# Patient Record
Sex: FEMALE | Race: WHITE | NOT HISPANIC OR LATINO | Employment: OTHER | URBAN - METROPOLITAN AREA
[De-identification: names, ages, dates, MRNs, and addresses within clinical notes are randomized per-mention and may not be internally consistent; named-entity substitution may affect disease eponyms.]

---

## 2017-02-01 ENCOUNTER — OFFICE VISIT (OUTPATIENT)
Dept: INTERNAL MEDICINE | Facility: CLINIC | Age: 67
End: 2017-02-01

## 2017-02-01 ENCOUNTER — APPOINTMENT (OUTPATIENT)
Dept: LAB | Facility: HOSPITAL | Age: 67
End: 2017-02-01

## 2017-02-01 VITALS
BODY MASS INDEX: 22.49 KG/M2 | WEIGHT: 135 LBS | HEART RATE: 52 BPM | DIASTOLIC BLOOD PRESSURE: 72 MMHG | HEIGHT: 65 IN | SYSTOLIC BLOOD PRESSURE: 108 MMHG | TEMPERATURE: 96.3 F | OXYGEN SATURATION: 99 % | RESPIRATION RATE: 16 BRPM

## 2017-02-01 DIAGNOSIS — M81.0 POST-MENOPAUSAL OSTEOPOROSIS: ICD-10-CM

## 2017-02-01 DIAGNOSIS — Z11.59 NEED FOR HEPATITIS C SCREENING TEST: ICD-10-CM

## 2017-02-01 DIAGNOSIS — M41.25 OTHER IDIOPATHIC SCOLIOSIS, THORACOLUMBAR REGION: ICD-10-CM

## 2017-02-01 DIAGNOSIS — J45.20 MILD INTERMITTENT ASTHMA WITHOUT COMPLICATION: Primary | ICD-10-CM

## 2017-02-01 DIAGNOSIS — E78.5 DYSLIPIDEMIA: ICD-10-CM

## 2017-02-01 DIAGNOSIS — J45.20 MILD INTERMITTENT ASTHMA WITHOUT COMPLICATION: ICD-10-CM

## 2017-02-01 DIAGNOSIS — G89.29 CHRONIC BILATERAL LOW BACK PAIN WITHOUT SCIATICA: ICD-10-CM

## 2017-02-01 DIAGNOSIS — M54.50 CHRONIC BILATERAL LOW BACK PAIN WITHOUT SCIATICA: ICD-10-CM

## 2017-02-01 DIAGNOSIS — M19.049 PRIMARY LOCALIZED OSTEOARTHROSIS OF HAND, UNSPECIFIED LATERALITY: ICD-10-CM

## 2017-02-01 DIAGNOSIS — G47.9 DYSSOMNIA: ICD-10-CM

## 2017-02-01 DIAGNOSIS — E55.9 VITAMIN D DEFICIENCY: ICD-10-CM

## 2017-02-01 DIAGNOSIS — J30.9 ATOPIC RHINITIS: Primary | ICD-10-CM

## 2017-02-01 DIAGNOSIS — Z82.49 FAMILY HISTORY OF HEART DISEASE: ICD-10-CM

## 2017-02-01 DIAGNOSIS — Z00.00 PREVENTATIVE HEALTH CARE: ICD-10-CM

## 2017-02-01 DIAGNOSIS — M47.816 SPONDYLOSIS OF LUMBAR REGION WITHOUT MYELOPATHY OR RADICULOPATHY: ICD-10-CM

## 2017-02-01 LAB
25(OH)D3 SERPL-MCNC: 53.7 NG/ML
ALBUMIN SERPL-MCNC: 4.2 G/DL (ref 3.2–4.8)
ALBUMIN/GLOB SERPL: 1.6 G/DL (ref 1.5–2.5)
ALP SERPL-CCNC: 49 U/L (ref 25–100)
ALT SERPL W P-5'-P-CCNC: 24 U/L (ref 7–40)
ANION GAP SERPL CALCULATED.3IONS-SCNC: -1 MMOL/L (ref 3–11)
ARTICHOKE IGE QN: 100 MG/DL (ref 0–130)
AST SERPL-CCNC: 32 U/L (ref 0–33)
BACTERIA UR QL AUTO: ABNORMAL /HPF
BASOPHILS # BLD AUTO: 0.04 10*3/MM3 (ref 0–0.2)
BASOPHILS NFR BLD AUTO: 1.2 % (ref 0–1)
BILIRUB SERPL-MCNC: 0.5 MG/DL (ref 0.3–1.2)
BILIRUB UR QL STRIP: NEGATIVE
BUN BLD-MCNC: 16 MG/DL (ref 9–23)
BUN/CREAT SERPL: 22.9 (ref 7–25)
CALCIUM SPEC-SCNC: 9.9 MG/DL (ref 8.7–10.4)
CHLORIDE SERPL-SCNC: 109 MMOL/L (ref 99–109)
CHOLEST SERPL-MCNC: 182 MG/DL (ref 0–200)
CLARITY UR: ABNORMAL
CO2 SERPL-SCNC: 35 MMOL/L (ref 20–31)
COLOR UR: YELLOW
CREAT BLD-MCNC: 0.7 MG/DL (ref 0.6–1.3)
DEPRECATED RDW RBC AUTO: 44 FL (ref 37–54)
EOSINOPHIL # BLD AUTO: 0.19 10*3/MM3 (ref 0.1–0.3)
EOSINOPHIL NFR BLD AUTO: 5.8 % (ref 0–3)
ERYTHROCYTE [DISTWIDTH] IN BLOOD BY AUTOMATED COUNT: 13.3 % (ref 11.3–14.5)
GFR SERPL CREATININE-BSD FRML MDRD: 84 ML/MIN/1.73
GLOBULIN UR ELPH-MCNC: 2.6 GM/DL
GLUCOSE BLD-MCNC: 87 MG/DL (ref 70–100)
GLUCOSE UR STRIP-MCNC: NEGATIVE MG/DL
HCT VFR BLD AUTO: 42.8 % (ref 34.5–44)
HCV AB SER DONR QL: NORMAL
HDLC SERPL-MCNC: 69 MG/DL (ref 40–60)
HGB BLD-MCNC: 14.4 G/DL (ref 11.5–15.5)
HGB UR QL STRIP.AUTO: NEGATIVE
HYALINE CASTS UR QL AUTO: ABNORMAL /LPF
IMM GRANULOCYTES # BLD: 0 10*3/MM3 (ref 0–0.03)
IMM GRANULOCYTES NFR BLD: 0 % (ref 0–0.6)
KETONES UR QL STRIP: NEGATIVE
LEUKOCYTE ESTERASE UR QL STRIP.AUTO: NEGATIVE
LYMPHOCYTES # BLD AUTO: 1.19 10*3/MM3 (ref 0.6–4.8)
LYMPHOCYTES NFR BLD AUTO: 36.4 % (ref 24–44)
MCH RBC QN AUTO: 30.6 PG (ref 27–31)
MCHC RBC AUTO-ENTMCNC: 33.6 G/DL (ref 32–36)
MCV RBC AUTO: 91.1 FL (ref 80–99)
MONOCYTES # BLD AUTO: 0.35 10*3/MM3 (ref 0–1)
MONOCYTES NFR BLD AUTO: 10.7 % (ref 0–12)
NEUTROPHILS # BLD AUTO: 1.5 10*3/MM3 (ref 1.5–8.3)
NEUTROPHILS NFR BLD AUTO: 45.9 % (ref 41–71)
NITRITE UR QL STRIP: NEGATIVE
PH UR STRIP.AUTO: 7 [PH] (ref 5–8)
PLATELET # BLD AUTO: 184 10*3/MM3 (ref 150–450)
PMV BLD AUTO: 10.5 FL (ref 6–12)
POTASSIUM BLD-SCNC: 4.8 MMOL/L (ref 3.5–5.5)
PROT SERPL-MCNC: 6.8 G/DL (ref 5.7–8.2)
PROT UR QL STRIP: NEGATIVE
RBC # BLD AUTO: 4.7 10*6/MM3 (ref 3.89–5.14)
RBC # UR: ABNORMAL /HPF
REF LAB TEST METHOD: ABNORMAL
SODIUM BLD-SCNC: 143 MMOL/L (ref 132–146)
SP GR UR STRIP: 1.02 (ref 1–1.03)
SQUAMOUS #/AREA URNS HPF: ABNORMAL /HPF
TRIGL SERPL-MCNC: 39 MG/DL (ref 0–150)
UROBILINOGEN UR QL STRIP: ABNORMAL
WBC NRBC COR # BLD: 3.27 10*3/MM3 (ref 3.5–10.8)
WBC UR QL AUTO: ABNORMAL /HPF

## 2017-02-01 PROCEDURE — 86803 HEPATITIS C AB TEST: CPT | Performed by: INTERNAL MEDICINE

## 2017-02-01 PROCEDURE — 99214 OFFICE O/P EST MOD 30 MIN: CPT | Performed by: INTERNAL MEDICINE

## 2017-02-01 PROCEDURE — 82306 VITAMIN D 25 HYDROXY: CPT | Performed by: INTERNAL MEDICINE

## 2017-02-01 PROCEDURE — 85025 COMPLETE CBC W/AUTO DIFF WBC: CPT | Performed by: INTERNAL MEDICINE

## 2017-02-01 PROCEDURE — 80053 COMPREHEN METABOLIC PANEL: CPT | Performed by: INTERNAL MEDICINE

## 2017-02-01 PROCEDURE — 99397 PER PM REEVAL EST PAT 65+ YR: CPT | Performed by: INTERNAL MEDICINE

## 2017-02-01 PROCEDURE — 81001 URINALYSIS AUTO W/SCOPE: CPT | Performed by: INTERNAL MEDICINE

## 2017-02-01 PROCEDURE — 80061 LIPID PANEL: CPT | Performed by: INTERNAL MEDICINE

## 2017-02-01 PROCEDURE — 36415 COLL VENOUS BLD VENIPUNCTURE: CPT | Performed by: INTERNAL MEDICINE

## 2017-02-01 PROCEDURE — 93000 ELECTROCARDIOGRAM COMPLETE: CPT | Performed by: INTERNAL MEDICINE

## 2017-02-01 NOTE — PROGRESS NOTES
Subjective   Tatyana Willett is a 66 y.o. female.     Chief Complaint   Patient presents with   • Back Pain       History of Present Illness     The patient has a 4 year history of progressive back pain.  She's been found to have lumbar scoliosis and an MRI in 2013 showed lumbar spinal stenosis at L3-L4.  She had used gabapentin for 2 years for improved pain control.  She's been off of gabapentin last year and feels she is doing well without it.  She has used naproxen but does not need it regularly now.  She completed physical therapy in November and feels that her back maintenance exercises have greatly helped.  She has no sciatica.  She has not had to give up daily activities.    The patient has many years of recurring seasonal asthma.  She has done much better last year and is not required her Singulair or inhalers in the last year.  She has had severe exposures to allergens several years ago and underwent bronchoscopy in 2011.  She has had no recent cough or wheezing.    The following portions of the patient's history were reviewed and updated as appropriate: allergies, current medications, past family history, past medical history, past social history, past surgical history and problem list.    Active Ambulatory Problems     Diagnosis Date Noted   • Atopic rhinitis 05/13/2016   • Asthma 05/13/2016   • Ingrown toenail 05/13/2016   • Osteoarthritis of lumbar spine 05/13/2016   • Primary localized osteoarthrosis of hand 05/13/2016   • Scoliosis 05/13/2016   • Dyssomnia 05/13/2016   • Preventative health care 09/07/2016   • Low back pain 09/07/2016     Resolved Ambulatory Problems     Diagnosis Date Noted   • Cellulitis 05/13/2016   • Ethmoid sinusitis 05/13/2016     Past Medical History   Diagnosis Date   • Allergic rhinitis    • CTS (carpal tunnel syndrome) 2004   • Lumbar scoliosis    • Lumbar spinal stenosis 2013   • Menopause 2000   • Migraine 2004   • Mononucleosis 1962   • Osteoarthritis of hands,  bilateral    • Varicose vein of leg      Past Surgical History   Procedure Laterality Date   • Tonsillectomy     • Vein surgery Left      Ligation of left leg   • Bronchoscopy       Severe allergic pneumonitis     Family History   Problem Relation Age of Onset   • Dementia Mother    • Hearing loss Mother    • Osteoarthritis Mother    • Esophageal cancer Father        age 60   • Achalasia Brother    • Osteoarthritis Brother    • Pancreatic cancer Brother    • Pancreatic cancer Maternal Grandmother    • Heart failure Paternal Grandmother    • Heart attack Paternal Grandfather       age 65   • Osteoarthritis Brother      Bilateral hip replacements   • Breast cancer Neg Hx    • Ovarian cancer Neg Hx      Social History     Social History   • Marital status:      Spouse name: N/A   • Number of children: N/A   • Years of education: N/A     Occupational History   • Not on file.     Social History Main Topics   • Smoking status: Never Smoker   • Smokeless tobacco: Never Used   • Alcohol use No   • Drug use: No   • Sexual activity: Not on file     Other Topics Concern   • Not on file     Social History Narrative    Domestic life   lives in private home with         [employer]   L[employer] day        Sleep hygiene   some sleep impairment from back pain        Caffeine use   2 cups half-and-half coffee daily        Exercise habits    walks daily.  Back exercises regularly.  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   HENT: Negative for ear pain and sore throat.    Eyes: Negative for itching and visual disturbance.   Respiratory: Negative for cough and shortness of  "breath.    Cardiovascular: Negative for chest pain and palpitations.   Gastrointestinal: Negative for abdominal pain and nausea.   Endocrine: Negative for cold intolerance and heat intolerance.   Genitourinary: Negative for dysuria and hematuria.   Musculoskeletal: Positive for back pain. Negative for arthralgias.   Skin: Negative for rash and wound.   Allergic/Immunologic: Negative for environmental allergies and food allergies.   Neurological: Negative for dizziness and headaches.   Hematological: Negative for adenopathy. Does not bruise/bleed easily.   Psychiatric/Behavioral: Positive for sleep disturbance. The patient is not nervous/anxious.        Objective   Blood pressure 108/72, pulse 52, temperature 96.3 °F (35.7 °C), temperature source Oral, resp. rate 16, height 65\" (165.1 cm), weight 135 lb (61.2 kg), SpO2 99 %, not currently breastfeeding.    Physical Exam   Constitutional: She is oriented to person, place, and time. She appears well-developed and well-nourished. No distress.   HENT:   Right Ear: External ear normal.   Left Ear: External ear normal.   Nose: Nose normal.   Mouth/Throat: Oropharynx is clear and moist.   Eyes: EOM are normal. Pupils are equal, round, and reactive to light. No scleral icterus.   Neck: Normal range of motion. Neck supple. No JVD present. No thyromegaly present.   Cardiovascular: Normal rate, regular rhythm, normal heart sounds and intact distal pulses.    Pulmonary/Chest: Effort normal and breath sounds normal. She has no wheezes. She has no rales.   Abdominal: Soft. Bowel sounds are normal. She exhibits no distension and no mass. There is no tenderness.   Genitourinary:   Genitourinary Comments: Per GYN   Musculoskeletal: Normal range of motion. She exhibits no edema.   Moderate osteoarthritis of hands with good  strength minimal tenderness  Mild scoliosis of the L-spine   Lymphadenopathy:     She has no cervical adenopathy.   Neurological: She is alert and oriented to " person, place, and time. She displays normal reflexes. No cranial nerve deficit. She exhibits normal muscle tone. Coordination normal.   Vibratory normal  Romberg negative  Gait normal  Plantars normal   Skin: Skin is warm and dry. No rash noted.   Psychiatric: She has a normal mood and affect. Her behavior is normal. Judgment and thought content normal.   Nursing note and vitals reviewed.      ECG 12 Lead  Date/Time: 2/1/2017 3:09 PM  Performed by: CYRUS LEVIN  Authorized by: CYRUS LEVIN   Interpreted by ED physician: Cyrus Levin M.D.  Comparison: compared with previous ECG from 3/20/2013  Similar to previous ECG  Rhythm: sinus rhythm  Rate: normal  BPM: 60  Conduction: conduction normal  ST Segments: ST segments normal  T Waves: T waves normal  QRS axis: normal  Other: no other findings  Clinical impression: normal ECG  Comments: Indication - family history heart disease  Baseline EKG          Assessment/Plan   Tatyana was seen today for back pain.    Diagnoses and all orders for this visit:    Atopic rhinitis    Mild intermittent asthma without complication    Chronic bilateral low back pain without sciatica    Spondylosis of lumbar region without myelopathy or radiculopathy    Primary localized osteoarthrosis of hand, unspecified laterality    Other idiopathic scoliosis, thoracolumbar region    Dyssomnia    Preventative health care    Family history of heart disease  -     ECG 12 Lead    Post-menopausal osteoporosis  -     DEXA Bone Density Axial; Future      The patient's lumbar scoliosis and lumbar spinal stenosis have much improved in recent months with physical therapy.  I've asked the patient to be disciplined every morning with stretching and strengthening exercises.  Because of her postmenopausal state we will proceed with a DEXA scan to assess her risks and further needs treatment.    The patient's asthma appears to be in remission.  I've cautioned her to continue and environmental  control and use inhalers at least one month at a time when she symptomatic.  Her eosinophil count at 6% is the highest and a few years indicating high risk for exacerbations.    The patient has moderate osteoarthritis her hands.  She has good prescription strength and minimal tenderness.  Therapeutic putty will allow her to keep up her functional capacity and improve her pain control.    Patient's had many years of sleep disturbance previously related to her postmenopausal state.  She had done well with gabapentin the past but now does well on melatonin 5 mg at night.  She does have an occasional night of severe insomnia and does well with trazodone 25 mg.    The preventive exam has been reviewed in detail.  The patient has been fully counseled on preventative guidelines for vaccines, cancer screenings, and other health maintenance needs.   The patient has been counseled on guidelines for maintaining a lifestyle to promote good health and to minimize chronic diseases.  The patient has been assisted with scheduling these healthcare procedures for the coming year and given a written document outlining these recommendations.    Patient Instructions   1.  Continue same medications and supplements - as listed.    2.  Increase daily walking - at work by 500 steps daily - to improve physical fitness.    3.  Follow well-balanced American Heart Association diet - low in salt and low in sugar.    4.  Perform low back exercises 5 minutes every morning - to build strength and flexibility.    5.  Use therapeutic putty 3 days weekly - to keep good  strength.    6.  Exercise pulmonary hygiene - to protect lungs - use inhalers as needed to control symptoms.    7.  Return in one year - annual checkup fasting.    8.  DEXA scan this winter - to evaluate for osteoporosis.    9.  CBC shows 6% eosinophils.  This indicates moderate allergy activity.    10.  Other testing is acceptable and requires no change in treatment.    Current  Outpatient Prescriptions:   •  albuterol (PROAIR HFA) 108 (90 BASE) MCG/ACT inhaler, Inhale 1 spray daily as needed., Disp: , Rfl:   •  aspirin 81 MG tablet, Take 1 tablet by mouth 3 (Three) Times a Week., Disp: , Rfl:   •  budesonide-formoterol (SYMBICORT) 160-4.5 MCG/ACT inhaler, Inhale 2 sprays daily as needed., Disp: , Rfl:   •  Calcium Carbonate-Vitamin D (CALTRATE 600+D PO), Take 1 tablet by mouth Daily., Disp: , Rfl:   •  Cholecalciferol (VITAMIN D) 1000 UNITS tablet, Take 1 capsule by mouth Daily., Disp: , Rfl:   •  fluticasone (FLONASE) 50 MCG/ACT nasal spray, 1 spray into each nostril daily., Disp: , Rfl:   •  Glucosamine-Chondroit-Vit C-Mn (GLUCOSAMINE 1500 COMPLEX PO), Take 1 tablet by mouth daily., Disp: , Rfl:   •  loratadine (CLARITIN) 10 MG tablet, Take 1 tablet by mouth daily as needed., Disp: , Rfl:   •  melatonin 5 MG tablet tablet, Take 1 tablet by mouth every night., Disp: , Rfl:   •  montelukast (SINGULAIR) 10 MG tablet, Take 1 tablet by mouth daily as needed., Disp: , Rfl:   •  Multiple Vitamins-Minerals (WOMENS ONE DAILY) tablet, Take 1 tablet by mouth daily., Disp: , Rfl:   •  olopatadine (PATANASE) 0.6 % solution nasal solution, 1 spray into each nostril daily as needed., Disp: , Rfl:   •  Omega-3 Fatty Acids (FISH OIL) 1000 MG capsule capsule, Take 1 capsule by mouth daily., Disp: , Rfl:   •  vitamin C (ASCORBIC ACID) 500 MG tablet, Take 1 tablet by mouth daily., Disp: , Rfl:     Allergies   Allergen Reactions   • Shellfish Allergy Hives     oysters   • Levofloxacin Myalgia   • Poison Ivy Extract [Extract Of Poison Ivy] Rash       Immunization History   Administered Date(s) Administered   • Influenza, Quadrivalent 10/01/2016   • Pneumococcal Polysaccharide 03/21/2011   • Tdap 03/20/2008   • Zoster 04/25/2016     Electronically signed Cyrus Levin M.D.2/1/2017 5:18 PM

## 2017-02-01 NOTE — PATIENT INSTRUCTIONS
1.  Continue same medications and supplements - as listed.    2.  Increase daily walking - at work by 500 steps daily - to improve physical fitness.    3.  Follow well-balanced American Heart Association diet - low in salt and low in sugar.    4.  Perform low back exercises 5 minutes every morning - to build strength and flexibility.    5.  Use therapeutic putty 3 days weekly - to keep good  strength.    6.  Exercise pulmonary hygiene - to protect lungs - use inhalers as needed to control symptoms.    7.  Return in one year - annual checkup fasting.    8.  DEXA scan this winter - to evaluate for osteoporosis.

## 2017-02-02 ENCOUNTER — TRANSCRIBE ORDERS (OUTPATIENT)
Dept: INTERNAL MEDICINE | Facility: CLINIC | Age: 67
End: 2017-02-02

## 2017-02-02 DIAGNOSIS — Z12.31 VISIT FOR SCREENING MAMMOGRAM: Primary | ICD-10-CM

## 2017-06-08 RX ORDER — TRAZODONE HYDROCHLORIDE 50 MG/1
50 TABLET ORAL NIGHTLY PRN
Qty: 30 TABLET | Refills: 1 | Status: SHIPPED | OUTPATIENT
Start: 2017-06-08 | End: 2020-06-08

## 2017-06-08 RX ORDER — MONTELUKAST SODIUM 10 MG/1
10 TABLET ORAL DAILY PRN
Qty: 90 TABLET | Refills: 1 | Status: SHIPPED | OUTPATIENT
Start: 2017-06-08 | End: 2019-03-22 | Stop reason: SDUPTHER

## 2017-07-12 ENCOUNTER — HOSPITAL ENCOUNTER (OUTPATIENT)
Dept: MAMMOGRAPHY | Facility: HOSPITAL | Age: 67
Discharge: HOME | End: 2017-07-12
Attending: INTERNAL MEDICINE

## 2017-07-12 ENCOUNTER — HOSPITAL ENCOUNTER (OUTPATIENT)
Dept: BONE DENSITY | Facility: HOSPITAL | Age: 67
Discharge: HOME | End: 2017-07-12
Attending: INTERNAL MEDICINE | Admitting: INTERNAL MEDICINE

## 2017-07-12 DIAGNOSIS — M81.0 POST-MENOPAUSAL OSTEOPOROSIS: ICD-10-CM

## 2017-07-12 DIAGNOSIS — Z12.31 VISIT FOR SCREENING MAMMOGRAM: ICD-10-CM

## 2017-07-12 PROCEDURE — G0202 SCR MAMMO BI INCL CAD: HCPCS | Performed by: RADIOLOGY

## 2017-07-12 PROCEDURE — G0202 SCR MAMMO BI INCL CAD: HCPCS

## 2017-07-12 PROCEDURE — 77080 DXA BONE DENSITY AXIAL: CPT

## 2017-07-12 PROCEDURE — 77063 BREAST TOMOSYNTHESIS BI: CPT | Performed by: RADIOLOGY

## 2017-07-12 PROCEDURE — 77080 DXA BONE DENSITY AXIAL: CPT | Performed by: RADIOLOGY

## 2017-07-12 PROCEDURE — 77063 BREAST TOMOSYNTHESIS BI: CPT

## 2017-07-13 ENCOUNTER — TRANSCRIBE ORDERS (OUTPATIENT)
Dept: ADMINISTRATIVE | Facility: HOSPITAL | Age: 67
End: 2017-07-13

## 2017-07-13 DIAGNOSIS — R92.8 ABNORMAL MAMMOGRAM: Primary | ICD-10-CM

## 2017-07-26 ENCOUNTER — HOSPITAL ENCOUNTER (OUTPATIENT)
Dept: MAMMOGRAPHY | Facility: HOSPITAL | Age: 67
Discharge: HOME | End: 2017-07-26
Attending: INTERNAL MEDICINE | Admitting: INTERNAL MEDICINE

## 2017-07-26 DIAGNOSIS — R92.8 ABNORMAL MAMMOGRAM: ICD-10-CM

## 2017-07-26 PROCEDURE — G0206 DX MAMMO INCL CAD UNI: HCPCS

## 2017-07-26 PROCEDURE — G0279 TOMOSYNTHESIS, MAMMO: HCPCS

## 2017-07-26 PROCEDURE — G0206 DX MAMMO INCL CAD UNI: HCPCS | Performed by: RADIOLOGY

## 2017-07-26 PROCEDURE — G0279 TOMOSYNTHESIS, MAMMO: HCPCS | Performed by: RADIOLOGY

## 2017-08-09 ENCOUNTER — OFFICE VISIT (OUTPATIENT)
Dept: INTERNAL MEDICINE | Facility: CLINIC | Age: 67
End: 2017-08-09

## 2017-08-09 ENCOUNTER — HOSPITAL ENCOUNTER (OUTPATIENT)
Dept: GENERAL RADIOLOGY | Facility: HOSPITAL | Age: 67
Discharge: HOME | End: 2017-08-09
Attending: INTERNAL MEDICINE | Admitting: INTERNAL MEDICINE

## 2017-08-09 VITALS
BODY MASS INDEX: 21.47 KG/M2 | DIASTOLIC BLOOD PRESSURE: 80 MMHG | RESPIRATION RATE: 16 BRPM | HEART RATE: 76 BPM | TEMPERATURE: 99.5 F | OXYGEN SATURATION: 98 % | WEIGHT: 129 LBS | SYSTOLIC BLOOD PRESSURE: 106 MMHG

## 2017-08-09 DIAGNOSIS — J45.20 MILD INTERMITTENT ASTHMA WITHOUT COMPLICATION: Primary | ICD-10-CM

## 2017-08-09 DIAGNOSIS — R05.9 COUGH: ICD-10-CM

## 2017-08-09 LAB
BASOPHILS # BLD AUTO: 0.03 10*3/MM3 (ref 0–0.2)
BASOPHILS NFR BLD AUTO: 0.8 % (ref 0–1)
CRP SERPL-MCNC: 1.19 MG/DL (ref 0–1)
DEPRECATED RDW RBC AUTO: 42.6 FL (ref 37–54)
EOSINOPHIL # BLD AUTO: 0.1 10*3/MM3 (ref 0–0.3)
EOSINOPHIL NFR BLD AUTO: 2.6 % (ref 0–3)
ERYTHROCYTE [DISTWIDTH] IN BLOOD BY AUTOMATED COUNT: 13.2 % (ref 11.3–14.5)
HCT VFR BLD AUTO: 41.2 % (ref 34.5–44)
HGB BLD-MCNC: 13.7 G/DL (ref 11.5–15.5)
IMM GRANULOCYTES # BLD: 0.01 10*3/MM3 (ref 0–0.03)
IMM GRANULOCYTES NFR BLD: 0.3 % (ref 0–0.6)
LYMPHOCYTES # BLD AUTO: 1.3 10*3/MM3 (ref 0.6–4.8)
LYMPHOCYTES NFR BLD AUTO: 33.9 % (ref 24–44)
MCH RBC QN AUTO: 29.2 PG (ref 27–31)
MCHC RBC AUTO-ENTMCNC: 33.3 G/DL (ref 32–36)
MCV RBC AUTO: 87.8 FL (ref 80–99)
MONOCYTES # BLD AUTO: 0.35 10*3/MM3 (ref 0–1)
MONOCYTES NFR BLD AUTO: 9.1 % (ref 0–12)
NEUTROPHILS # BLD AUTO: 2.04 10*3/MM3 (ref 1.5–8.3)
NEUTROPHILS NFR BLD AUTO: 53.3 % (ref 41–71)
PLATELET # BLD AUTO: 140 10*3/MM3 (ref 150–450)
PMV BLD AUTO: 10.2 FL (ref 6–12)
RBC # BLD AUTO: 4.69 10*6/MM3 (ref 3.89–5.14)
WBC NRBC COR # BLD: 3.83 10*3/MM3 (ref 3.5–10.8)

## 2017-08-09 PROCEDURE — 85025 COMPLETE CBC W/AUTO DIFF WBC: CPT | Performed by: INTERNAL MEDICINE

## 2017-08-09 PROCEDURE — 36415 COLL VENOUS BLD VENIPUNCTURE: CPT | Performed by: INTERNAL MEDICINE

## 2017-08-09 PROCEDURE — 87070 CULTURE OTHR SPECIMN AEROBIC: CPT | Performed by: INTERNAL MEDICINE

## 2017-08-09 PROCEDURE — 87205 SMEAR GRAM STAIN: CPT | Performed by: INTERNAL MEDICINE

## 2017-08-09 PROCEDURE — 71020 HC CHEST PA AND LATERAL: CPT

## 2017-08-09 PROCEDURE — 86140 C-REACTIVE PROTEIN: CPT | Performed by: INTERNAL MEDICINE

## 2017-08-09 PROCEDURE — 99214 OFFICE O/P EST MOD 30 MIN: CPT | Performed by: INTERNAL MEDICINE

## 2017-08-09 PROCEDURE — 82785 ASSAY OF IGE: CPT | Performed by: INTERNAL MEDICINE

## 2017-08-09 RX ORDER — BUDESONIDE AND FORMOTEROL FUMARATE DIHYDRATE 160; 4.5 UG/1; UG/1
2 AEROSOL RESPIRATORY (INHALATION) DAILY PRN
Qty: 10.2 G | Refills: 5 | Status: SHIPPED | OUTPATIENT
Start: 2017-08-09 | End: 2018-11-27

## 2017-08-09 RX ORDER — ALBUTEROL SULFATE 90 UG/1
1 AEROSOL, METERED RESPIRATORY (INHALATION) DAILY PRN
Qty: 8.5 INHALER | Refills: 5 | Status: SHIPPED | OUTPATIENT
Start: 2017-08-09 | End: 2023-01-12 | Stop reason: SDUPTHER

## 2017-08-09 RX ORDER — AZITHROMYCIN 250 MG/1
TABLET, FILM COATED ORAL
Qty: 6 TABLET | Refills: 1 | Status: SHIPPED | OUTPATIENT
Start: 2017-08-09 | End: 2017-08-14

## 2017-08-09 NOTE — PROGRESS NOTES
Subjective   Tatyana Willett is a 66 y.o. female.     History of Present Illness     The patient developed a sore throat and congestion 6 days ago.  She is not aware of any exposures to dust or illness.  4 days ago she developed a more intense cough which became progressively productive.  Over the last 24 hours she has had yellow phlegm with chest congestion and head congestion.  She has taken no recent antibiotics.  She has a long history of asthma but his had minimal allergy symptoms in recent months.  Several years ago she had an episode of severe recurring bronchial asthma, pneumonia, and underwent bronchoscopy.  She has been much more cautious with pulmonary hygiene in his had less symptoms in recent years.    The following portions of the patient's history were reviewed and updated as appropriate: allergies, current medications, past family history, past medical history, past social history, past surgical history and problem list.    Review of Systems   Constitutional: Positive for fatigue. Negative for appetite change and fever.   HENT: Positive for congestion and sore throat. Negative for ear pain.    Respiratory: Positive for cough and shortness of breath. Negative for wheezing.    Cardiovascular: Negative for chest pain and palpitations.   Gastrointestinal: Negative for abdominal pain and nausea.   Musculoskeletal: Negative for arthralgias and back pain.   Neurological: Negative for dizziness and headaches.       Objective   Blood pressure 106/80, pulse 76, temperature 99.5 °F (37.5 °C), temperature source Oral, resp. rate 16, weight 129 lb (58.5 kg), SpO2 98 %, not currently breastfeeding.    Physical Exam   Constitutional: She is oriented to person, place, and time. She appears well-developed and well-nourished.   Appears mildly fatigued   HENT:   Right Ear: External ear normal.   Left Ear: External ear normal.   Moderate edema erythema oropharynx   Eyes: Conjunctivae and EOM are normal. Pupils are  equal, round, and reactive to light.   Neck: Normal range of motion. Neck supple. No JVD present.   Cardiovascular: Normal rate, regular rhythm, normal heart sounds and intact distal pulses.    Pulmonary/Chest: Effort normal and breath sounds normal. She has no wheezes. She has no rales.   Lymphadenopathy:     She has no cervical adenopathy.   Neurological: She is alert and oriented to person, place, and time. She exhibits normal muscle tone. Coordination normal.   Psychiatric: She has a normal mood and affect. Her behavior is normal. Judgment and thought content normal.   Nursing note and vitals reviewed.    Procedures  Assessment/Plan   Tatyana was seen today for cough.    Diagnoses and all orders for this visit:    Mild intermittent asthma without complication  -     XR Chest PA & Lateral  -     Cancel: CBC & Differential  -     Cancel: C-reactive Protein  -     Cancel: IgE  -     Cancel: RESPIRATORY CULTURE; Future  -     CBC & Differential  -     C-reactive Protein  -     IgE  -     RESPIRATORY CULTURE  -     CBC Auto Differential    Cough  -     XR Chest PA & Lateral  -     Cancel: CBC & Differential  -     Cancel: C-reactive Protein  -     Cancel: IgE  -     Cancel: RESPIRATORY CULTURE; Future  -     CBC & Differential  -     C-reactive Protein  -     IgE  -     RESPIRATORY CULTURE  -     CBC Auto Differential    Other orders  -     azithromycin (ZITHROMAX) 250 MG tablet; Take 2 tablets the first day, then 1 tablet daily for 4 days.      The patient has an acute progressive bronchitis and should respond well to azithromycin.  Because of her exposures to chronically ill individuals at work, we will proceed with sputum culture.    The patient has recurring seasonal asthma.  Laboratory testing may give us an indication of her degree of allergic reactivity and a possible benefit from prednisone.    The patient has a moderate upper respiratory infection with head congestion and possible mild sinusitis.  This  should clear with nasal saline, Flonase, Mucinex, and her antibiotic.    Patient Instructions   1.  Start azithromycin 2 tablets now - one tablet daily for 10 more days.    2.  Take plain Mucinex twice daily - for 10 days.    3.  Use Spiriva 1 whiff each morning - for 30 days.    4.  Consider prednisone - based on laboratory testing.    5.  Chest x-ray is baseline - shows no active infiltrates.    6.  Resume Symbicort 1 puff twice daily - over the next month.    7.  Return visit - one week.      Electronically signed Cyrus Levin M.D.8/9/2017 2:55 PM

## 2017-08-09 NOTE — PATIENT INSTRUCTIONS
1.  Start azithromycin 2 tablets now - one tablet daily for 10 more days.    2.  Take plain Mucinex twice daily - for 10 days.    3.  Use Spiriva 1 whiff each morning - for 30 days.    4.  Consider prednisone - based on laboratory testing.    5.  Chest x-ray is baseline - shows no active infiltrates.    6.  Resume Symbicort 1 puff twice daily - over the next month.    7.  Return visit - one week.

## 2017-08-10 ENCOUNTER — DOCUMENTATION (OUTPATIENT)
Dept: INTERNAL MEDICINE | Facility: CLINIC | Age: 67
End: 2017-08-10

## 2017-08-10 LAB — TOTAL IGE SMQN RAST: 371 IU/ML (ref 0–100)

## 2017-08-10 NOTE — PROGRESS NOTES
Telephone call placed to patient this morning and notified of lab results.  She feels slightly better after the first day of treatment.  Antibiotics and inhalers.  Reevaluate next week.

## 2017-08-11 LAB
BACTERIA SPEC RESP CULT: NORMAL
GRAM STN SPEC: NORMAL

## 2017-08-14 DIAGNOSIS — J01.00 ACUTE NON-RECURRENT MAXILLARY SINUSITIS: Primary | ICD-10-CM

## 2017-08-14 PROBLEM — J01.90 ACUTE SINUSITIS: Status: ACTIVE | Noted: 2017-08-14

## 2017-08-14 RX ORDER — AMOXICILLIN AND CLAVULANATE POTASSIUM 875; 125 MG/1; MG/1
1 TABLET, FILM COATED ORAL 2 TIMES DAILY
Qty: 14 TABLET | Refills: 0 | Status: SHIPPED | OUTPATIENT
Start: 2017-08-14 | End: 2018-01-26

## 2017-08-14 NOTE — PROGRESS NOTES
Patient's cough is markedly improved after Z-[place].  She took her last tablet yesterday.  She has had a mild productive cough today.  She has moderate facial tenderness and nasal congestion.  She has had marked nasal drainage with purulent material.  She has no headache or earache.  She has some airway tightness and trace wheezes.  HEENT exam today is benign.  Lungs are mildly tight with a mild dry cough.  Patient in minimal distress.    I've asked the patient take 5 days of Augmentin.  We have discussed Levaquin and will forego this.  Reassess in 4 days.

## 2017-10-19 ENCOUNTER — OFFICE VISIT (OUTPATIENT)
Dept: INTERNAL MEDICINE | Facility: CLINIC | Age: 67
End: 2017-10-19

## 2017-10-19 VITALS
RESPIRATION RATE: 16 BRPM | BODY MASS INDEX: 21.3 KG/M2 | SYSTOLIC BLOOD PRESSURE: 106 MMHG | WEIGHT: 128 LBS | DIASTOLIC BLOOD PRESSURE: 80 MMHG | OXYGEN SATURATION: 97 % | TEMPERATURE: 98.4 F | HEART RATE: 68 BPM

## 2017-10-19 DIAGNOSIS — J30.2 ACUTE SEASONAL ALLERGIC RHINITIS, UNSPECIFIED TRIGGER: Primary | ICD-10-CM

## 2017-10-19 DIAGNOSIS — I88.9 CERVICAL LYMPHADENITIS: ICD-10-CM

## 2017-10-19 DIAGNOSIS — J01.00 ACUTE NON-RECURRENT MAXILLARY SINUSITIS: ICD-10-CM

## 2017-10-19 PROBLEM — J01.90 ACUTE SINUSITIS: Status: RESOLVED | Noted: 2017-08-14 | Resolved: 2017-10-19

## 2017-10-19 PROCEDURE — 87186 SC STD MICRODIL/AGAR DIL: CPT | Performed by: INTERNAL MEDICINE

## 2017-10-19 PROCEDURE — 87070 CULTURE OTHR SPECIMN AEROBIC: CPT | Performed by: INTERNAL MEDICINE

## 2017-10-19 PROCEDURE — 87077 CULTURE AEROBIC IDENTIFY: CPT | Performed by: INTERNAL MEDICINE

## 2017-10-19 PROCEDURE — 87147 CULTURE TYPE IMMUNOLOGIC: CPT | Performed by: INTERNAL MEDICINE

## 2017-10-19 PROCEDURE — 99213 OFFICE O/P EST LOW 20 MIN: CPT | Performed by: INTERNAL MEDICINE

## 2017-10-19 NOTE — PATIENT INSTRUCTIONS
1.  Continue Augmentin every 12 hours - for at least 5 more days.    2.  Use nasal saline - clear nasal passages - morning and night.    3.  Continue use Flonase spray  -  morning and night  - for the next 2 weeks.    4.  Speak to nurse on Monday - about culture results and status report.    5.  Return visit February - annual checkup fasting.

## 2017-10-19 NOTE — PROGRESS NOTES
Subjective   Tatyana Willett is a 67 y.o. female.     History of Present Illness     The patient's had 2 weeks of right earache.  She has had some difficulty swallowing in her right side of the throat.  She has had no headache but has had some right facial tenderness and nasal congestion.  She has had no cough or wheezing.  She started Augmentin 4 days ago and feels she is 30% better.    The following portions of the patient's history were reviewed and updated as appropriate: allergies, current medications, past family history, past medical history, past social history, past surgical history and problem list.    Review of Systems   Constitutional: Negative for appetite change, chills, fatigue and fever.   HENT: Positive for ear pain, sinus pain, sinus pressure and sore throat.    Respiratory: Negative for cough and shortness of breath.    Cardiovascular: Negative for chest pain and palpitations.   Gastrointestinal: Negative for abdominal distention and nausea.   Neurological: Negative for dizziness, light-headedness and headaches.       Objective   Blood pressure 106/80, pulse 68, temperature 98.4 °F (36.9 °C), temperature source Oral, resp. rate 16, weight 128 lb (58.1 kg), SpO2 97 %, not currently breastfeeding.    Physical Exam   Constitutional: She is oriented to person, place, and time. She appears well-developed and well-nourished. No distress.   HENT:   Right Ear: External ear normal.   Left Ear: External ear normal.   Mild edema and erythema oropharynx right more than left.  Ear canals and TMs are fully normal.   Eyes: EOM are normal. Pupils are equal, round, and reactive to light.   Minimal allergic conjunctivitis   Neck: Normal range of motion. Neck supple. No JVD present.   Mild tenderness of right anterior cervical chain with minimal nodes   Cardiovascular: Normal rate and regular rhythm.    Pulmonary/Chest: Effort normal and breath sounds normal. She has no wheezes.   Neurological: She is alert and  oriented to person, place, and time. She exhibits normal muscle tone. Coordination normal.   Psychiatric: She has a normal mood and affect. Her behavior is normal. Judgment and thought content normal.   Nursing note and vitals reviewed.    Procedures  Assessment/Plan   Tatyana was seen today for sore throat.    Diagnoses and all orders for this visit:    Acute seasonal allergic rhinitis, unspecified trigger    Acute non-recurrent maxillary sinusitis  -     Respiratory Culture - Sputum, Nasopharynx    Cervical lymphadenitis    The patient has mild right anterior cervical lymphadenitis associated with mild pharyngitis.  This is likely secondary to infected nasal drainage.    The patient has acute allergic rhinitis with congestion peaking during hayfever season.  I've asked her to be consistent with nasal saline and Flonase to control active allergies.    The patient has mild right maxillary sinusitis with acute tenderness.  This infection and pharyngitis should respond to Augmentin.    Patient Instructions   1.  Continue Augmentin every 12 hours - for at least 5 more days.    2.  Use nasal saline - clear nasal passages - morning and night.    3.  Continue use Flonase spray  -  morning and night  - for the next 2 weeks.    4.  Speak to nurse on Monday - about culture results and status report.    5.  Return visit February - annual checkup fasting.    6.  Consider course of Bactrim in 4 days if necessary.    Electronically signed Cyrus Levin M.D.10/19/2017 5:58 PM

## 2017-10-20 LAB
BACTERIA SPEC AEROBE CULT: NORMAL
BACTERIA SPEC AEROBE CULT: NORMAL

## 2017-10-23 ENCOUNTER — TELEPHONE (OUTPATIENT)
Dept: INTERNAL MEDICINE | Facility: CLINIC | Age: 67
End: 2017-10-23

## 2017-10-23 RX ORDER — SULFAMETHOXAZOLE AND TRIMETHOPRIM 800; 160 MG/1; MG/1
1 TABLET ORAL EVERY 12 HOURS
Qty: 14 TABLET | Refills: 0 | Status: SHIPPED | OUTPATIENT
Start: 2017-10-23 | End: 2018-01-26

## 2017-10-23 NOTE — TELEPHONE ENCOUNTER
Called labs to pt. Left VM.   Per TGF pt had reported to him that she's not feeling any better than she did on Thursday.  Pt's nasal cult shows serratia marcescens (A) and 1+ staph epidermis, both susceptible to bactrim.  Per TGF, Bactrim DS #14 one q 12hr. Call Friday to give us an update.

## 2017-10-24 LAB
BACTERIA SPEC AEROBE CULT: ABNORMAL

## 2017-10-27 ENCOUNTER — DOCUMENTATION (OUTPATIENT)
Dept: INTERNAL MEDICINE | Facility: CLINIC | Age: 67
End: 2017-10-27

## 2017-10-27 NOTE — PROGRESS NOTES
The patient's on her fourth day of Bactrim DS.  She still has some raw feeling in her right throat and some right neck pain and ear pain.  She has no substantial headache or cough.    The patient has mild edema erythema of the throat.  She has mild tenderness of her right anterior neck, apparently improved since last week.  She has no sinus tenderness and normal ear exam.    Call on Wednesday, November 1 with status report.  Consider sinus CT and ENT consultation.

## 2017-12-08 ENCOUNTER — TELEPHONE (OUTPATIENT)
Dept: INTERNAL MEDICINE | Facility: CLINIC | Age: 67
End: 2017-12-08

## 2017-12-08 NOTE — TELEPHONE ENCOUNTER
Pt called to report that she's developed symptoms again of throat pain, trouble swallowing, and rt ear pain. She's made an appt w ENT- Dr Duckworth for Mon. She would like us to fax over to him her Oct's nasal culture results and anything else TGF would like. She says she saw the dentist today who doesn't think this has anything to do w her teeth.   Per TGF -  to fax over all of this years labs, recent CXR 8/9, and last ox note 10/19.

## 2018-01-26 ENCOUNTER — OFFICE VISIT (OUTPATIENT)
Dept: INTERNAL MEDICINE | Facility: CLINIC | Age: 68
End: 2018-01-26

## 2018-01-26 VITALS
DIASTOLIC BLOOD PRESSURE: 70 MMHG | RESPIRATION RATE: 16 BRPM | HEIGHT: 65 IN | SYSTOLIC BLOOD PRESSURE: 114 MMHG | WEIGHT: 126 LBS | BODY MASS INDEX: 20.99 KG/M2 | OXYGEN SATURATION: 98 % | TEMPERATURE: 98.2 F | HEART RATE: 76 BPM

## 2018-01-26 DIAGNOSIS — J45.20 MILD INTERMITTENT ASTHMA WITHOUT COMPLICATION: Primary | ICD-10-CM

## 2018-01-26 DIAGNOSIS — M41.25 OTHER IDIOPATHIC SCOLIOSIS, THORACOLUMBAR REGION: ICD-10-CM

## 2018-01-26 DIAGNOSIS — K21.9 GASTROESOPHAGEAL REFLUX DISEASE, ESOPHAGITIS PRESENCE NOT SPECIFIED: ICD-10-CM

## 2018-01-26 DIAGNOSIS — R07.0 THROAT PAIN: ICD-10-CM

## 2018-01-26 DIAGNOSIS — Z00.00 PREVENTATIVE HEALTH CARE: ICD-10-CM

## 2018-01-26 DIAGNOSIS — G47.9 DYSSOMNIA: ICD-10-CM

## 2018-01-26 DIAGNOSIS — M54.50 CHRONIC BILATERAL LOW BACK PAIN WITHOUT SCIATICA: ICD-10-CM

## 2018-01-26 DIAGNOSIS — Z82.49 FAMILY HISTORY OF HEART DISEASE: ICD-10-CM

## 2018-01-26 DIAGNOSIS — M47.816 SPONDYLOSIS OF LUMBAR REGION WITHOUT MYELOPATHY OR RADICULOPATHY: ICD-10-CM

## 2018-01-26 DIAGNOSIS — M19.049 PRIMARY LOCALIZED OSTEOARTHROSIS OF HAND, UNSPECIFIED LATERALITY: ICD-10-CM

## 2018-01-26 DIAGNOSIS — J30.2 ACUTE SEASONAL ALLERGIC RHINITIS, UNSPECIFIED TRIGGER: ICD-10-CM

## 2018-01-26 DIAGNOSIS — G89.29 CHRONIC BILATERAL LOW BACK PAIN WITHOUT SCIATICA: ICD-10-CM

## 2018-01-26 DIAGNOSIS — E78.5 DYSLIPIDEMIA: ICD-10-CM

## 2018-01-26 DIAGNOSIS — E55.9 VITAMIN D DEFICIENCY: ICD-10-CM

## 2018-01-26 PROBLEM — J01.00 ACUTE NON-RECURRENT MAXILLARY SINUSITIS: Status: RESOLVED | Noted: 2017-10-19 | Resolved: 2018-01-26

## 2018-01-26 PROBLEM — I88.9 CERVICAL LYMPHADENITIS: Status: RESOLVED | Noted: 2017-10-19 | Resolved: 2018-01-26

## 2018-01-26 PROCEDURE — 99215 OFFICE O/P EST HI 40 MIN: CPT | Performed by: INTERNAL MEDICINE

## 2018-01-26 PROCEDURE — 99397 PER PM REEVAL EST PAT 65+ YR: CPT | Performed by: INTERNAL MEDICINE

## 2018-01-26 PROCEDURE — 93000 ELECTROCARDIOGRAM COMPLETE: CPT | Performed by: INTERNAL MEDICINE

## 2018-01-26 RX ORDER — RANITIDINE 150 MG/1
300 TABLET ORAL EVERY MORNING
Qty: 90 TABLET | Refills: 3 | Status: SHIPPED | OUTPATIENT
Start: 2018-01-26 | End: 2018-02-16

## 2018-01-26 NOTE — PROGRESS NOTES
Subjective   Tatyana Willett is a 67 y.o. female.     Chief Complaint   Patient presents with   • Sore Throat   • Annual Exam       History of Present Illness     The patient's had increasing GERD symptoms in recent years.  Her symptoms are moderately persistent.  She has been on omeprazole with partial improvement.  She has started on ranitidine to minimize omeprazole use for long-term safety.  She avoids alcohol and spicy foods.  She had elevated the head of her bed for many years but the bed was leveled last year with a new mattress.  Her father did die with esophageal cancer.    The following portions of the patient's history were reviewed and updated as appropriate: allergies, current medications, past family history, past medical history, past social history, past surgical history and problem list.    Active Ambulatory Problems     Diagnosis Date Noted   • Atopic rhinitis 05/13/2016   • Asthma 05/13/2016   • Ingrown toenail 05/13/2016   • Osteoarthritis of lumbar spine 05/13/2016   • Primary localized osteoarthrosis of hand 05/13/2016   • Scoliosis 05/13/2016   • Dyssomnia 05/13/2016   • Preventative health care 09/07/2016   • Low back pain 09/07/2016   • Throat pain 01/26/2018   • GERD (gastroesophageal reflux disease) 01/26/2018   • Family history of heart disease 01/26/2018     Resolved Ambulatory Problems     Diagnosis Date Noted   • Cellulitis 05/13/2016   • Ethmoid sinusitis 05/13/2016   • Acute sinusitis 08/14/2017   • Acute non-recurrent maxillary sinusitis 10/19/2017   • Cervical lymphadenitis 10/19/2017     Past Medical History:   Diagnosis Date   • Allergic rhinitis Lifelong   • Asthma 2011   • CTS (carpal tunnel syndrome) 2004   • GERD (gastroesophageal reflux disease) 2016   • Lumbar scoliosis 2013   • Lumbar spinal stenosis 2013   • Migraine 2004   • Mononucleosis 1962   • Osteoarthritis of hands, bilateral    • Post menopausal syndrome 2000   • Sleep disorder    • Varicose vein of leg 1990      Past Surgical History:   Procedure Laterality Date   • BRONCHOSCOPY      Severe allergic pneumonitis   • COLONOSCOPY  2013    Benign study   • LARYNGOSCOPY  2017    Indirect exam   • TONSILLECTOMY  1961   • VEIN SURGERY Left 1990    Ligation of left leg     Family History   Problem Relation Age of Onset   • Dementia Mother    • Hearing loss Mother    • Osteoarthritis Mother    • Esophageal cancer Father        age 60   • Achalasia Brother    • Osteoarthritis Brother    • Pancreatic cancer Brother    • Pancreatic cancer Maternal Grandmother    • Heart failure Paternal Grandmother    • Heart attack Paternal Grandfather       age 65   • Osteoarthritis Brother      Bilateral hip replacements   • Breast cancer Neg Hx    • Ovarian cancer Neg Hx      Social History     Social History   • Marital status:      Spouse name: N/A   • Number of children: N/A   • Years of education: N/A     Occupational History   • Not on file.     Social History Main Topics   • Smoking status: Never Smoker   • Smokeless tobacco: Never Used   • Alcohol use No   • Drug use: No   • Sexual activity: Not on file     Other Topics Concern   • Not on file     Social History Narrative    Domestic life   lives in private home with         [employer]   [place]        Sleep hygiene   some sleep impairment from back pain        Caffeine use   1 cups half-and-half coffee daily        Exercise habits    walks daily.  Back exercises regularly.  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   HENT: Positive for sore throat. Negative for ear pain and sinus pressure.    Eyes: Negative for  "itching and visual disturbance.   Respiratory: Positive for cough. Negative for chest tightness and shortness of breath.    Cardiovascular: Negative for chest pain and palpitations.   Gastrointestinal: Negative for abdominal pain and nausea.   Endocrine: Negative for cold intolerance and heat intolerance.   Genitourinary: Negative for dysuria and hematuria.   Musculoskeletal: Positive for back pain. Negative for arthralgias.        Mild intermittent back and hand stiffness   Skin: Negative for rash and wound.   Allergic/Immunologic: Negative for environmental allergies and food allergies.   Neurological: Negative for dizziness and headaches.   Hematological: Negative for adenopathy. Does not bruise/bleed easily.   Psychiatric/Behavioral: Negative for sleep disturbance. The patient is not nervous/anxious.        Objective   Blood pressure 114/70, pulse 76, temperature 98.2 °F (36.8 °C), temperature source Oral, resp. rate 16, height 165.1 cm (65\"), weight 57.2 kg (126 lb), SpO2 98 %, not currently breastfeeding.    Physical Exam   Constitutional: She is oriented to person, place, and time. She appears well-developed and well-nourished. No distress.   HENT:   Right Ear: External ear normal.   Left Ear: External ear normal.   Moderate edema erythema oropharynx   Eyes: EOM are normal. Pupils are equal, round, and reactive to light. No scleral icterus.   Neck: Normal range of motion. Neck supple. No JVD present. No thyromegaly present.   Cardiovascular: Normal rate, regular rhythm, normal heart sounds and intact distal pulses.    No murmur heard.  Pulmonary/Chest: Effort normal and breath sounds normal. She has no wheezes. She has no rales.   Abdominal: Soft. Bowel sounds are normal. She exhibits no distension and no mass. There is no tenderness.   Genitourinary:   Genitourinary Comments: Per GYN   Musculoskeletal: Normal range of motion. She exhibits no edema or tenderness.   Moderate osteoarthritis of hands with good "  strength.  Mild scoliosis of thoracolumbar spine.   Lymphadenopathy:     She has no cervical adenopathy.   Neurological: She is alert and oriented to person, place, and time. She displays normal reflexes. No cranial nerve deficit. She exhibits normal muscle tone. Coordination normal.   Vibratory normal  Romberg negative  Gait normal  Plantars downgoing     Skin: Skin is warm and dry. No rash noted.   Breast exam normal   Psychiatric: She has a normal mood and affect. Her behavior is normal. Thought content normal.   Nursing note and vitals reviewed.      ECG 12 Lead  Date/Time: 1/26/2018 9:45 AM  Performed by: CYRUS LEVIN  Authorized by: CYRUS LEVIN   Interpreted by ED physician: Cyrus Levin M.D.  Comparison: compared with previous ECG from 2/1/2017  Similar to previous ECG  Rhythm: sinus rhythm  Rate: normal  BPM: 65  Conduction: conduction normal  ST Segments: ST segments normal  T Waves: T waves normal  QRS axis: normal  Other: no other findings  Clinical impression: normal ECG  Comments: Indication - family history heart disease  Baseline EKG          Assessment/Plan   Tatyana was seen today for sore throat and annual exam.    Diagnoses and all orders for this visit:    Mild intermittent asthma without complication  -     CBC & Differential  -     C-reactive Protein  -     IgE  -     CBC Auto Differential    Spondylosis of lumbar region without myelopathy or radiculopathy    Primary localized osteoarthrosis of hand, unspecified laterality    Other idiopathic scoliosis, thoracolumbar region    Dyssomnia    Preventative health care    Chronic bilateral low back pain without sciatica    Throat pain    Gastroesophageal reflux disease, esophagitis presence not specified  -     Urinalysis With / Microscopic If Indicated - Urine, Clean Catch  -     Ambulatory referral for Screening EGD    Acute seasonal allergic rhinitis, unspecified trigger    Family history of heart disease  -     ECG 12  Lead    Dyslipidemia  -     Comprehensive Metabolic Panel  -     Lipid Panel  -     TSH    Vitamin D deficiency  -     Vitamin D 25 Hydroxy    Other orders  -     raNITIdine (ZANTAC) 150 MG tablet; Take 2 tablets by mouth Every Morning.      The patient's GERD symptoms have become a severe refractory problem.  In view of her failure on standard treatment and her father's history of esophageal cancer we will proceed with upper endoscopy.    The patient has significant chronic allergic rhinitis and intermittent asthma.  She has done much better the last year due to environmental control.  She should use inhalers on an as-needed basis.    The patient's had many years of intermittent sleep disturbance especially after the menopause.  She is using melatonin and trazodone effectively on an as-needed basis.    The patient has significant scoliosis and lumbar spinal stenosis.  She has had episodes of severe back pain.  She has done better in recent years with regular back exercises and protecting her back from excessive strain.    The preventive exam has been reviewed in detail.  The patient has been fully counseled on preventative guidelines for vaccines, cancer screenings, and other health maintenance needs.   The patient has been counseled on guidelines for maintaining a lifestyle to promote good health and to minimize chronic diseases.  The patient has been assisted with scheduling these healthcare procedures for the coming year and given a written document outlining these recommendations.    Patient Instructions   1.  Upper endoscopy soon - to evaluate chronic esophagitis.    2.  Elevate head of bed - at least 4 inches - to prevent reflux at night.    3.  Start ranitidine every morning - to improve acid suppression.    3.  Use nasal saline and Flonase spray every morning - to prevent nasal allergies.    4.  Do back exercises - at least 3 days weekly - with upper body resistance exercises - 3 days weekly.    5.   Continue regular walking - for physical fitness.    6.  Maintain a well-balanced - American Heart Association diet.    7.  Consider Pulmicort inhaler -  for cough vatiant Asthma.    8.  Return visit one year - annual checkup fasting.    9.  LDL cholesterol acceptable at 109.  Continue American Heart Association diet guidelines.    10.  Eosinophil count 7% indicates allergy activity.  IgE level pending.    11.  White blood count borderline low indicates stable mild neutropenia.  Continue annual monitoring.    12.  Other laboratory tests are acceptable and require no change in treatment.    Current Outpatient Prescriptions:   •  albuterol (PROAIR HFA) 108 (90 BASE) MCG/ACT inhaler, Inhale 1 puff Daily As Needed for Shortness of Air., Disp: 1 inhaler, Rfl: 5  •  aspirin 81 MG tablet, Take 1 tablet by mouth 3 (Three) Times a Week., Disp: , Rfl:   •  budesonide-formoterol (SYMBICORT) 160-4.5 MCG/ACT inhaler, Inhale 2 puffs Daily As Needed (allergies)., Disp: 1 inhaler, Rfl: 5  •  Calcium Carbonate-Vitamin D (CALTRATE 600+D PO), Take 1 tablet by mouth Daily., Disp: , Rfl:   •  Cholecalciferol (VITAMIN D) 1000 UNITS tablet, Take 1 capsule by mouth Daily., Disp: , Rfl:   •  fluticasone (FLONASE) 50 MCG/ACT nasal spray, 1 spray into each nostril Every Morning., Disp: , Rfl:   •  Glucosamine-Chondroit-Vit C-Mn (GLUCOSAMINE 1500 COMPLEX PO), Take 1 tablet by mouth daily., Disp: , Rfl:   •  loratadine (CLARITIN) 10 MG tablet, Take 1 tablet by mouth daily as needed., Disp: , Rfl:   •  melatonin 5 MG tablet tablet, Take 1 tablet by mouth every night., Disp: , Rfl:   •  montelukast (SINGULAIR) 10 MG tablet, Take 1 tablet by mouth Daily As Needed (During allergy season)., Disp: 90 tablet, Rfl: 1  •  Multiple Vitamins-Minerals (WOMENS ONE DAILY) tablet, Take 1 tablet by mouth daily., Disp: , Rfl:   •  Omega-3 Fatty Acids (FISH OIL) 1000 MG capsule capsule, Take 1 capsule by mouth daily., Disp: , Rfl:   •  omeprazole (priLOSEC) 40 MG  capsule, Take 1 capsule by mouth 30-60 minutes prior to evening meal., Disp: 30 capsule, Rfl: 5  •  raNITIdine (ZANTAC) 150 MG tablet, Take 2 tablets by mouth Every Morning., Disp: 90 tablet, Rfl: 3  •  traZODone (DESYREL) 50 MG tablet, Take 1 tablet by mouth At Night As Needed for Sleep., Disp: 30 tablet, Rfl: 1  •  vitamin C (ASCORBIC ACID) 500 MG tablet, Take 1 tablet by mouth daily., Disp: , Rfl:     Allergies   Allergen Reactions   • Shellfish Allergy Hives     oysters   • Levofloxacin Myalgia   • Poison Ivy Extract [Poison Ivy Extract] Rash       Immunization History   Administered Date(s) Administered   • Influenza, Quadrivalent 10/01/2016, 10/01/2017   • Pneumococcal Polysaccharide 03/21/2011   • Td 03/29/2004   • Tdap 03/20/2008   • Zoster 04/25/2016     Electronically signed Cyrus Levin M.D.1/28/2018 9:42 AM

## 2018-01-26 NOTE — PATIENT INSTRUCTIONS
1.  Upper endoscopy soon - to evaluate chronic esophagitis.    2.  Elevate head of bed - at least 4 inches - to prevent reflux at night.    3.  Start ranitidine every morning - to improve acid suppression.    3.  Use nasal saline and Flonase spray every morning - to prevent nasal allergies.    4.  Do back exercises - at least 3 days weekly - with upper body resistance exercises - 3 days weekly.    5.  Continue regular walking - for physical fitness.    6.  Maintain a well-balanced - American Heart Association diet.    7.  Consider Pulmicort inhaler -  for cough vatiant Asthma.    8.  Return visit one year - annual checkup fasting.

## 2018-01-27 ENCOUNTER — APPOINTMENT (OUTPATIENT)
Dept: LAB | Facility: HOSPITAL | Age: 68
End: 2018-01-27

## 2018-01-27 LAB
25(OH)D3 SERPL-MCNC: 44.4 NG/ML
ALBUMIN SERPL-MCNC: 4.2 G/DL (ref 3.2–4.8)
ALBUMIN/GLOB SERPL: 1.9 G/DL (ref 1.5–2.5)
ALP SERPL-CCNC: 57 U/L (ref 25–100)
ALT SERPL W P-5'-P-CCNC: 29 U/L (ref 7–40)
ANION GAP SERPL CALCULATED.3IONS-SCNC: 6 MMOL/L (ref 3–11)
ARTICHOKE IGE QN: 109 MG/DL (ref 0–130)
AST SERPL-CCNC: 30 U/L (ref 0–33)
BASOPHILS # BLD AUTO: 0.06 10*3/MM3 (ref 0–0.2)
BASOPHILS NFR BLD AUTO: 1.8 % (ref 0–1)
BILIRUB SERPL-MCNC: 0.5 MG/DL (ref 0.3–1.2)
BILIRUB UR QL STRIP: NEGATIVE
BUN BLD-MCNC: 15 MG/DL (ref 9–23)
BUN/CREAT SERPL: 21.4 (ref 7–25)
CALCIUM SPEC-SCNC: 9.2 MG/DL (ref 8.7–10.4)
CHLORIDE SERPL-SCNC: 106 MMOL/L (ref 99–109)
CHOLEST SERPL-MCNC: 181 MG/DL (ref 0–200)
CLARITY UR: CLEAR
CO2 SERPL-SCNC: 28 MMOL/L (ref 20–31)
COLOR UR: YELLOW
CREAT BLD-MCNC: 0.7 MG/DL (ref 0.6–1.3)
CRP SERPL-MCNC: 0.09 MG/DL (ref 0–1)
DEPRECATED RDW RBC AUTO: 43.6 FL (ref 37–54)
EOSINOPHIL # BLD AUTO: 0.21 10*3/MM3 (ref 0–0.3)
EOSINOPHIL NFR BLD AUTO: 6.5 % (ref 0–3)
ERYTHROCYTE [DISTWIDTH] IN BLOOD BY AUTOMATED COUNT: 13.2 % (ref 11.3–14.5)
GFR SERPL CREATININE-BSD FRML MDRD: 83 ML/MIN/1.73
GLOBULIN UR ELPH-MCNC: 2.2 GM/DL
GLUCOSE BLD-MCNC: 89 MG/DL (ref 70–100)
GLUCOSE UR STRIP-MCNC: NEGATIVE MG/DL
HCT VFR BLD AUTO: 40.6 % (ref 34.5–44)
HDLC SERPL-MCNC: 69 MG/DL (ref 40–60)
HGB BLD-MCNC: 13.4 G/DL (ref 11.5–15.5)
HGB UR QL STRIP.AUTO: NEGATIVE
IMM GRANULOCYTES # BLD: 0.01 10*3/MM3 (ref 0–0.03)
IMM GRANULOCYTES NFR BLD: 0.3 % (ref 0–0.6)
KETONES UR QL STRIP: NEGATIVE
LEUKOCYTE ESTERASE UR QL STRIP.AUTO: NEGATIVE
LYMPHOCYTES # BLD AUTO: 1.22 10*3/MM3 (ref 0.6–4.8)
LYMPHOCYTES NFR BLD AUTO: 37.5 % (ref 24–44)
MCH RBC QN AUTO: 29.6 PG (ref 27–31)
MCHC RBC AUTO-ENTMCNC: 33 G/DL (ref 32–36)
MCV RBC AUTO: 89.8 FL (ref 80–99)
MONOCYTES # BLD AUTO: 0.31 10*3/MM3 (ref 0–1)
MONOCYTES NFR BLD AUTO: 9.5 % (ref 0–12)
NEUTROPHILS # BLD AUTO: 1.44 10*3/MM3 (ref 1.5–8.3)
NEUTROPHILS NFR BLD AUTO: 44.4 % (ref 41–71)
NITRITE UR QL STRIP: NEGATIVE
PH UR STRIP.AUTO: 7 [PH] (ref 5–8)
PLATELET # BLD AUTO: 160 10*3/MM3 (ref 150–450)
PMV BLD AUTO: 10.4 FL (ref 6–12)
POTASSIUM BLD-SCNC: 3.9 MMOL/L (ref 3.5–5.5)
PROT SERPL-MCNC: 6.4 G/DL (ref 5.7–8.2)
PROT UR QL STRIP: NEGATIVE
RBC # BLD AUTO: 4.52 10*6/MM3 (ref 3.89–5.14)
SODIUM BLD-SCNC: 140 MMOL/L (ref 132–146)
SP GR UR STRIP: 1.02 (ref 1–1.03)
TRIGL SERPL-MCNC: 34 MG/DL (ref 0–150)
TSH SERPL DL<=0.05 MIU/L-ACNC: 2.92 MIU/ML (ref 0.35–5.35)
UROBILINOGEN UR QL STRIP: NORMAL
WBC NRBC COR # BLD: 3.25 10*3/MM3 (ref 3.5–10.8)

## 2018-01-27 PROCEDURE — 82785 ASSAY OF IGE: CPT | Performed by: INTERNAL MEDICINE

## 2018-01-27 PROCEDURE — 80053 COMPREHEN METABOLIC PANEL: CPT | Performed by: INTERNAL MEDICINE

## 2018-01-27 PROCEDURE — 81003 URINALYSIS AUTO W/O SCOPE: CPT | Performed by: INTERNAL MEDICINE

## 2018-01-27 PROCEDURE — 84443 ASSAY THYROID STIM HORMONE: CPT | Performed by: INTERNAL MEDICINE

## 2018-01-27 PROCEDURE — 85025 COMPLETE CBC W/AUTO DIFF WBC: CPT | Performed by: INTERNAL MEDICINE

## 2018-01-27 PROCEDURE — 82306 VITAMIN D 25 HYDROXY: CPT | Performed by: INTERNAL MEDICINE

## 2018-01-27 PROCEDURE — 80061 LIPID PANEL: CPT | Performed by: INTERNAL MEDICINE

## 2018-01-27 PROCEDURE — 86140 C-REACTIVE PROTEIN: CPT | Performed by: INTERNAL MEDICINE

## 2018-01-27 PROCEDURE — 36415 COLL VENOUS BLD VENIPUNCTURE: CPT | Performed by: INTERNAL MEDICINE

## 2018-01-28 DIAGNOSIS — Z20.828 EXPOSURE TO INFLUENZA: Primary | ICD-10-CM

## 2018-01-28 RX ORDER — OSELTAMIVIR PHOSPHATE 75 MG/1
75 CAPSULE ORAL DAILY
Qty: 10 CAPSULE | Refills: 0 | OUTPATIENT
Start: 2018-01-28 | End: 2018-02-16

## 2018-01-28 NOTE — PROGRESS NOTES
The patient and her  were exposed this weekend to their grandchild with the influenza.  They do not have symptoms.

## 2018-01-30 ENCOUNTER — TELEPHONE (OUTPATIENT)
Dept: INTERNAL MEDICINE | Facility: CLINIC | Age: 68
End: 2018-01-30

## 2018-01-30 NOTE — TELEPHONE ENCOUNTER
Called labs to pt.  Per TGF:  -LDL ok at 109. Cont AHA diet guidelines.  -Eosinophil count 7% indicates allergy activity.  IgE level pending.  -WBC borderline low indicates stable mild neutropenia.  Continue annual monitoring.   -Other labs ok -no change in treatment.  Will call w/ [facility].  Pt verb understanding.

## 2018-02-02 ENCOUNTER — TELEPHONE (OUTPATIENT)
Dept: INTERNAL MEDICINE | Facility: CLINIC | Age: 68
End: 2018-02-02

## 2018-02-02 LAB — TOTAL IGE SMQN RAST: 221 IU/ML (ref 0–100)

## 2018-02-02 NOTE — TELEPHONE ENCOUNTER
IGE resulted this AM - call result to pt.  Per TGF no new orders. Level improving.  She verb understanding.

## 2018-02-07 ENCOUNTER — OUTSIDE FACILITY SERVICE (OUTPATIENT)
Dept: GASTROENTEROLOGY | Facility: CLINIC | Age: 68
End: 2018-02-07

## 2018-02-07 ENCOUNTER — LAB REQUISITION (OUTPATIENT)
Dept: LAB | Facility: HOSPITAL | Age: 68
End: 2018-02-07

## 2018-02-07 DIAGNOSIS — F45.8 OTHER SOMATOFORM DISORDERS: ICD-10-CM

## 2018-02-07 PROCEDURE — 88305 TISSUE EXAM BY PATHOLOGIST: CPT | Performed by: INTERNAL MEDICINE

## 2018-02-07 PROCEDURE — 43239 EGD BIOPSY SINGLE/MULTIPLE: CPT | Performed by: INTERNAL MEDICINE

## 2018-02-08 LAB
CYTO UR: NORMAL
LAB AP CASE REPORT: NORMAL
LAB AP CLINICAL INFORMATION: NORMAL
LAB AP DIAGNOSIS COMMENT: NORMAL
Lab: NORMAL
PATH REPORT.FINAL DX SPEC: NORMAL
PATH REPORT.GROSS SPEC: NORMAL

## 2018-02-16 ENCOUNTER — OFFICE VISIT (OUTPATIENT)
Dept: INTERNAL MEDICINE | Facility: CLINIC | Age: 68
End: 2018-02-16

## 2018-02-16 VITALS
RESPIRATION RATE: 16 BRPM | SYSTOLIC BLOOD PRESSURE: 114 MMHG | BODY MASS INDEX: 21.63 KG/M2 | OXYGEN SATURATION: 99 % | HEART RATE: 60 BPM | TEMPERATURE: 98.5 F | DIASTOLIC BLOOD PRESSURE: 80 MMHG | WEIGHT: 130 LBS

## 2018-02-16 DIAGNOSIS — K21.9 GASTROESOPHAGEAL REFLUX DISEASE, ESOPHAGITIS PRESENCE NOT SPECIFIED: Primary | ICD-10-CM

## 2018-02-16 DIAGNOSIS — R07.0 THROAT PAIN: ICD-10-CM

## 2018-02-16 DIAGNOSIS — J30.2 ACUTE SEASONAL ALLERGIC RHINITIS, UNSPECIFIED TRIGGER: ICD-10-CM

## 2018-02-16 DIAGNOSIS — J04.0 ACUTE ARYTENOIDITIS: ICD-10-CM

## 2018-02-16 DIAGNOSIS — K20.0 EOSINOPHILIC ESOPHAGITIS: ICD-10-CM

## 2018-02-16 PROCEDURE — 99213 OFFICE O/P EST LOW 20 MIN: CPT | Performed by: INTERNAL MEDICINE

## 2018-02-16 RX ORDER — OMEPRAZOLE 40 MG/1
40 CAPSULE, DELAYED RELEASE ORAL EVERY 12 HOURS
Qty: 60 CAPSULE | Refills: 5 | Status: SHIPPED | OUTPATIENT
Start: 2018-02-16 | End: 2018-10-08

## 2018-02-16 NOTE — PROGRESS NOTES
Subjective   Tatyana Willett is a 67 y.o. female.     History of Present Illness     The patient's had 3 month history of recurring sore throat and dry cough.  She underwent ENT examination with the diagnosis of a arytinoiditis.  The cause of this condition was not clear.  She does have substantial chronic respiratory allergies.  She also has a history of GERD and underwent EGD on February 7.  This showed minimal chronic gastritis but active esophageal eosinophilia.  She was asked to start omeprazole 40 mg daily and consider topical steroids for refractory symptoms.    The following portions of the patient's history were reviewed and updated as appropriate: allergies, current medications, past family history, past medical history, past social history, past surgical history and problem list.    Review of Systems   Constitutional: Negative for appetite change and fatigue.   HENT: Positive for sore throat and trouble swallowing.         Occasional right ear pain   Respiratory: Positive for cough. Negative for shortness of breath and wheezing.         Course of preventive Tamiflu 2 weeks ago   Cardiovascular: Negative for chest pain and palpitations.   Gastrointestinal: Negative for abdominal distention and nausea.   Neurological: Negative for dizziness and light-headedness.       Objective   Blood pressure 114/80, pulse 60, temperature 98.5 °F (36.9 °C), temperature source Oral, resp. rate 16, weight 59 kg (130 lb), SpO2 99 %, not currently breastfeeding.    Physical Exam   Constitutional: She is oriented to person, place, and time. She appears well-developed and well-nourished. No distress.   HENT:   Right Ear: External ear normal.   Left Ear: External ear normal.   Minimal throat erythema   Eyes: Conjunctivae and EOM are normal. Pupils are equal, round, and reactive to light.   Neck: Normal range of motion. Neck supple.   Cardiovascular: Normal rate, regular rhythm and normal heart sounds.    Pulmonary/Chest:  Effort normal and breath sounds normal. She has no wheezes. She has no rales.   Lymphadenopathy:     She has no cervical adenopathy.   Neurological: She is alert and oriented to person, place, and time. She exhibits normal muscle tone. Coordination normal.   Psychiatric: She has a normal mood and affect. Her behavior is normal. Judgment and thought content normal.   Nursing note and vitals reviewed.    Procedures  Assessment/Plan   Tatyana was seen today for sore throat.    Diagnoses and all orders for this visit:    Gastroesophageal reflux disease, esophagitis presence not specified    Eosinophilic esophagitis    Acute arytenoiditis    Throat pain    Acute seasonal allergic rhinitis, unspecified trigger    Other orders  -     omeprazole (priLOSEC) 40 MG capsule; Take 1 capsule by mouth Every 12 (Twelve) Hours.      The patient does have biopsy-proven esophageal eosinophilia.  We will start with high-dose omeprazole and consider topical steroids.    The patient does have significant chronic allergic disease and may have some food allergy contributing to her esophagitis.  I've counseled her on possibly seeking allergy evaluation.    The patient's arytenoiditis is unclear as to the underlying cause.  It may be due to acid reflux or possibly from infected drainage from her sinus passages.    Patient Instructions   1.  Increase omeprazole 40 mg - every 12 hours for 30 day trial.    2.  Practice good antireflux strategy - every night.    3.  Consider using Flonase - for swallowing - to suppress eosinophilic esophagitis.    4.  Consider short course of antibiotics - to clear infected sinus drainage.    5.  Consider food allergy testing.    6.  Consider additional testing - from the dentist.    7.  Next checkup January - fasting.    Electronically signed Cyrus Levin M.D.2/18/2018 2:13 PM

## 2018-09-11 ENCOUNTER — TRANSCRIBE ORDERS (OUTPATIENT)
Dept: ADMINISTRATIVE | Facility: HOSPITAL | Age: 68
End: 2018-09-11

## 2018-09-11 DIAGNOSIS — Z12.31 VISIT FOR SCREENING MAMMOGRAM: Primary | ICD-10-CM

## 2018-10-05 ENCOUNTER — HOSPITAL ENCOUNTER (OUTPATIENT)
Dept: MAMMOGRAPHY | Facility: HOSPITAL | Age: 68
Discharge: HOME | End: 2018-10-05
Admitting: FAMILY MEDICINE

## 2018-10-05 ENCOUNTER — APPOINTMENT (OUTPATIENT)
Dept: MAMMOGRAPHY | Facility: HOSPITAL | Age: 68
End: 2018-10-05

## 2018-10-05 DIAGNOSIS — Z12.31 VISIT FOR SCREENING MAMMOGRAM: ICD-10-CM

## 2018-10-05 PROCEDURE — 77063 BREAST TOMOSYNTHESIS BI: CPT

## 2018-10-05 PROCEDURE — 77063 BREAST TOMOSYNTHESIS BI: CPT | Performed by: RADIOLOGY

## 2018-10-05 PROCEDURE — 77067 SCR MAMMO BI INCL CAD: CPT

## 2018-10-05 PROCEDURE — 77067 SCR MAMMO BI INCL CAD: CPT | Performed by: RADIOLOGY

## 2018-10-08 ENCOUNTER — OFFICE VISIT (OUTPATIENT)
Dept: FAMILY MEDICINE CLINIC | Facility: CLINIC | Age: 68
End: 2018-10-08

## 2018-10-08 ENCOUNTER — LAB (OUTPATIENT)
Dept: LAB | Facility: HOSPITAL | Age: 68
End: 2018-10-08

## 2018-10-08 VITALS
OXYGEN SATURATION: 98 % | HEIGHT: 65 IN | DIASTOLIC BLOOD PRESSURE: 68 MMHG | SYSTOLIC BLOOD PRESSURE: 118 MMHG | BODY MASS INDEX: 21.33 KG/M2 | WEIGHT: 128 LBS | HEART RATE: 66 BPM

## 2018-10-08 DIAGNOSIS — Z23 NEED FOR TETANUS BOOSTER: Primary | ICD-10-CM

## 2018-10-08 DIAGNOSIS — Z02.1 ENCOUNTER FOR PRE-EMPLOYMENT EXAMINATION: ICD-10-CM

## 2018-10-08 DIAGNOSIS — Z23 NEED FOR INFLUENZA VACCINATION: ICD-10-CM

## 2018-10-08 DIAGNOSIS — Z23 NEED FOR TETANUS BOOSTER: ICD-10-CM

## 2018-10-08 LAB
HBV SURFACE AB SER RIA-ACNC: REACTIVE
RUBV IGG SER QL: NORMAL
RUBV IGG SER-ACNC: >500 IU/ML

## 2018-10-08 PROCEDURE — 90472 IMMUNIZATION ADMIN EACH ADD: CPT | Performed by: FAMILY MEDICINE

## 2018-10-08 PROCEDURE — 90471 IMMUNIZATION ADMIN: CPT | Performed by: FAMILY MEDICINE

## 2018-10-08 PROCEDURE — 86765 RUBEOLA ANTIBODY: CPT | Performed by: FAMILY MEDICINE

## 2018-10-08 PROCEDURE — 86706 HEP B SURFACE ANTIBODY: CPT | Performed by: FAMILY MEDICINE

## 2018-10-08 PROCEDURE — 86735 MUMPS ANTIBODY: CPT | Performed by: FAMILY MEDICINE

## 2018-10-08 PROCEDURE — 86787 VARICELLA-ZOSTER ANTIBODY: CPT | Performed by: FAMILY MEDICINE

## 2018-10-08 PROCEDURE — 90662 IIV NO PRSV INCREASED AG IM: CPT | Performed by: FAMILY MEDICINE

## 2018-10-08 PROCEDURE — 36415 COLL VENOUS BLD VENIPUNCTURE: CPT

## 2018-10-08 PROCEDURE — 86762 RUBELLA ANTIBODY: CPT | Performed by: FAMILY MEDICINE

## 2018-10-08 PROCEDURE — 99203 OFFICE O/P NEW LOW 30 MIN: CPT | Performed by: FAMILY MEDICINE

## 2018-10-08 PROCEDURE — 90714 TD VACC NO PRESV 7 YRS+ IM: CPT | Performed by: FAMILY MEDICINE

## 2018-10-08 NOTE — PROGRESS NOTES
Subjective   Tatyana Willett is a 68 y.o. female.     Chief Complaint   Patient presents with   • Establish Care       History of Present Illness     Previous primary care: [facility]    Chronic health conditions:  Asthma: Stable, uses Singulair  Lumbar spine degenerative disc disease: Stable, controlled with exercise  Dyssomnia: Managed well on melatonin and trazodone  GERD: Stable    Acute complaints:  60-year-old female presents today for preemployment examination.  She will be going on a conference and needs a note from her physician as well as updated immunizations.    The following portions of the patient's history were reviewed and updated as appropriate: allergies, current medications, past family history, past medical history, past social history, past surgical history and problem list.    Active Ambulatory Problems     Diagnosis Date Noted   • Atopic rhinitis 05/13/2016   • Asthma 05/13/2016   • Ingrown toenail 05/13/2016   • Osteoarthritis of lumbar spine 05/13/2016   • Primary localized osteoarthrosis of hand 05/13/2016   • Scoliosis 05/13/2016   • Dyssomnia 05/13/2016   • Preventative health care 09/07/2016   • Low back pain 09/07/2016   • Throat pain 01/26/2018   • GERD (gastroesophageal reflux disease) 01/26/2018   • Family history of heart disease 01/26/2018   • Eosinophilic esophagitis 02/16/2018   • Acute arytenoiditis 02/16/2018     Resolved Ambulatory Problems     Diagnosis Date Noted   • Cellulitis 05/13/2016   • Ethmoid sinusitis 05/13/2016   • Acute sinusitis 08/14/2017   • Acute non-recurrent maxillary sinusitis 10/19/2017   • Cervical lymphadenitis 10/19/2017     Past Medical History:   Diagnosis Date   • Allergic rhinitis Lifelong   • Asthma 2011   • CTS (carpal tunnel syndrome) 2004   • Eosinophilic esophagitis 2018   • GERD (gastroesophageal reflux disease) 2016   • Lumbar scoliosis 2013   • Lumbar spinal stenosis 2013   • Migraine 2004   • Mononucleosis 1962   • Osteoarthritis of hands,  bilateral    • Post menopausal syndrome    • Sleep disorder    • Varicose vein of leg      Past Surgical History:   Procedure Laterality Date   • BRONCHOSCOPY      Severe allergic pneumonitis   • COLONOSCOPY  2013    Benign study   • LARYNGOSCOPY  2017    Indirect exam   • TONSILLECTOMY     • VEIN SURGERY Left     Ligation of left leg     Family History   Problem Relation Age of Onset   • Dementia Mother    • Hearing loss Mother    • Osteoarthritis Mother    • Esophageal cancer Father           age 60   • Achalasia Brother    • Osteoarthritis Brother    • Pancreatic cancer Brother    • Pancreatic cancer Maternal Grandmother    • Heart failure Paternal Grandmother    • Heart attack Paternal Grandfather          age 65   • Osteoarthritis Brother         Bilateral hip replacements   • Breast cancer Neg Hx    • Ovarian cancer Neg Hx      Social History     Social History   • Marital status:      Spouse name: N/A   • Number of children: N/A   • Years of education: N/A     Occupational History   • Not on file.     Social History Main Topics   • Smoking status: Never Smoker   • Smokeless tobacco: Never Used   • Alcohol use No   • Drug use: No   • Sexual activity: Not on file     Other Topics Concern   • Not on file     Social History Narrative    Domestic life   lives in private home with         Hinduism   Ep[employer]op[employer]        Sleep hygiene   some sleep impairment from back pain        Caffeine use   1 cups half-and-half coffee daily        Exercise habits    walks daily.  Back exercises regularly.  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    HENT: Negative.    Eyes: Negative.  "   Respiratory: Negative for cough, chest tightness, shortness of breath and wheezing.    Cardiovascular: Negative for chest pain and palpitations.   Gastrointestinal: Negative for abdominal distention, abdominal pain, constipation, diarrhea, nausea and vomiting.   Musculoskeletal: Negative for gait problem and joint swelling.   Skin: Negative for color change and wound.   Psychiatric/Behavioral: Negative for agitation and hallucinations.       Objective   Blood pressure 118/68, pulse 66, height 165.1 cm (65\"), weight 58.1 kg (128 lb), SpO2 98 %, not currently breastfeeding.  Nursing note reviewed  Physical Exam  Const: NAD, A&Ox4, Pleasant, Cooperative  Eyes: EOMI, no conjunctivitis  ENT: No nasal discharge present, neck supple  Cardiac: Regular rate and rhythm, no peripheral edema or cyanosis  Resp: Respiratory rate within normal limits, no increased work of breathing, no audible wheezing or retractions noted  GI: No distention or ascites  MSK: Motor and sensation grossly intact in bilateral upper extremities  Neurologic, CN II-XII grossly intact  Psych: Appropriate mood and behavior.  Skin: Pink, warm, dry  Procedures  Assessment/Plan   Tatyana was seen today for establish care.    Diagnoses and all orders for this visit:    Need for tetanus booster  -     Td Vaccine Greater Than or Equal To 6yo Preservative Free IM  -     Varicella Zoster Antibodies, IgG / IgM; Future  -     Hepatitis B Surface Antibody; Future  -     Rubeola Antibody IgG; Future  -     Mumps Antibody, IgG; Future  -     Rubella antibody, IgG; Future    Need for influenza vaccination  -     Flu Vaccine High Dose PF 65YR+ ([id number])  -     Varicella Zoster Antibodies, IgG / IgM; Future  -     Hepatitis B Surface Antibody; Future  -     Rubeola Antibody IgG; Future  -     Mumps Antibody, IgG; Future  -     Rubella antibody, IgG; Future    Encounter for pre-employment examination  -     Td Vaccine Greater Than or Equal To 6yo Preservative Free " IM  -     Flu Vaccine High Dose PF 65YR+ ([id number])  -     Varicella Zoster Antibodies, IgG / IgM; Future  -     Hepatitis B Surface Antibody; Future  -     Rubeola Antibody IgG; Future  -     Mumps Antibody, IgG; Future  -     Rubella antibody, IgG; Future        Acute concerns:  #1 preemployment examination  Flu vaccine given today.  Up-to-date on all vaccinations.  No concerns about her fitness for the course.  Lab titers as indicated above.    Asthma: Stable, uses Singulair  Lumbar spine degenerative disc disease: Stable, controlled with exercise  Dyssomnia: Managed well on melatonin and trazodone  GERD: Stable    We will plan to obtain previous records both for chronic preventative care as well as those related to the current episode of care.  Any records that the patient brought with her today were reviewed personally by me during the visit today and will be scanned into the chart for posterity.    There are no Patient Instructions on file for this visit.    Ambulatory progress note signed and attested to by Stepan Ragland D.O.

## 2018-10-10 LAB
MEV IGG SER IA-ACNC: >300 AU/ML
MUV IGG SER IA-ACNC: >300 AU/ML
VZV IGG SER IA-ACNC: 3984 INDEX
VZV IGM SER IA-ACNC: <0.91 INDEX (ref 0–0.9)

## 2018-10-19 ENCOUNTER — TELEPHONE (OUTPATIENT)
Dept: FAMILY MEDICINE CLINIC | Facility: CLINIC | Age: 68
End: 2018-10-19

## 2018-10-19 DIAGNOSIS — Z00.00 PREVENTATIVE HEALTH CARE: Primary | ICD-10-CM

## 2018-10-19 NOTE — TELEPHONE ENCOUNTER
Placed. It is usually the same lab but evidently they just defaulted to the qualitative. But I called to make sure they ran it as quantitative this time.

## 2018-10-19 NOTE — TELEPHONE ENCOUNTER
PATIENT NEEDS QUANTITATIVE HEP B ORDERED INSTEAD OF QUALITATIVE HEP B. SHE WOULD LIKE FOR DR. REYNAGA TO PLACE THE ORDER AND SHE WILL GO TO THE HOSPITAL LAB TO GET DONE.   [phone]

## 2018-10-24 ENCOUNTER — LAB (OUTPATIENT)
Dept: LAB | Facility: HOSPITAL | Age: 68
End: 2018-10-24

## 2018-10-24 DIAGNOSIS — Z00.00 PREVENTATIVE HEALTH CARE: ICD-10-CM

## 2018-10-24 PROCEDURE — 36415 COLL VENOUS BLD VENIPUNCTURE: CPT

## 2018-10-24 PROCEDURE — 86317 IMMUNOASSAY INFECTIOUS AGENT: CPT

## 2018-10-26 LAB — HBV SURFACE AB SER-ACNC: 258.1 MIU/ML

## 2018-11-28 RX ORDER — BUDESONIDE AND FORMOTEROL FUMARATE DIHYDRATE 160; 4.5 UG/1; UG/1
2 AEROSOL RESPIRATORY (INHALATION) DAILY PRN
Qty: 10.2 G | Refills: 5 | Status: SHIPPED | OUTPATIENT
Start: 2018-11-28 | End: 2022-04-26 | Stop reason: SDUPTHER

## 2019-02-06 ENCOUNTER — CONSULT (OUTPATIENT)
Dept: SLEEP MEDICINE | Facility: HOSPITAL | Age: 69
End: 2019-02-06

## 2019-02-06 VITALS
WEIGHT: 131.2 LBS | OXYGEN SATURATION: 98 % | DIASTOLIC BLOOD PRESSURE: 56 MMHG | HEART RATE: 56 BPM | SYSTOLIC BLOOD PRESSURE: 98 MMHG | HEIGHT: 66 IN | BODY MASS INDEX: 21.08 KG/M2

## 2019-02-06 DIAGNOSIS — G47.54 PARASOMNIA IN CONDITIONS CLASSIFIED ELSEWHERE: Primary | ICD-10-CM

## 2019-02-06 PROCEDURE — 99203 OFFICE O/P NEW LOW 30 MIN: CPT | Performed by: INTERNAL MEDICINE

## 2019-02-06 NOTE — PROGRESS NOTES
Subjective   Tatyana Willett is a 68 y.o. female is being seen for self-referral for disturbed sleep.  Her primary care physician is Dr. Stepan Ragland    History of Present Illness  Patient states she only spot she slept fairly well and then roughly 8 years ago she started being noticed to have moaning when she was sleeping these are expiratory noises that she makes.  It's been gradually getting worse.  She is making some much noise now that she sleeps in a separate room from her .  She does not note any snoring.  He says she's usually sleeping on her stomach or her side.  She lost about 20 pounds 2 years ago with effort.  She did not change any medications recently.  She has a prescription for trazodone to help her sleep but she rarely takes it.  She does take melatonin recently.  She sometimes will fall asleep if sitting quietly during the day.  She denies any problems while driving.    She says she does make the moaning sounds in all positions.  She denies ever breaking her nose or having trouble breathing through her nose.  She denies waking with a dry mouth gasping choking or coughing.  She had a history of sore throat in 2017 was lasted for several months.  She was seen by ENT and thought to have some reflux symptoms she has not had any problems recently and quit taking her reflux medicines.  She denies any kicking or jerking her legs at night she denies hypnagogic hallucinations sleep paralysis or cataplexy.  She has a history of some right hip pain occasionally bothers her.      She is to bed about 10:30 and will fall asleep in 5 minutes.  She wakens twice during the night.  She thinks she gets 7-7/2 hours of sleep.  She generally feels rested.  She denies any history of diabetes hypertension coronary artery disease.  She does have a history of arthritis.  Allergies   Allergen Reactions   • Shellfish Allergy Hives     oysters   • Levofloxacin Myalgia   • Poison Ivy Extract [Poison Ivy Extract]  Rash    She has multiple environmental allergies      Current Outpatient Medications:   •  albuterol (PROAIR HFA) 108 (90 Base) MCG/ACT inhaler, Inhale 1 puff Daily As Needed for Shortness of Air., Disp: 8.5 inhaler, Rfl: 5  •  aspirin 81 MG tablet, Take 1 tablet by mouth 3 (Three) Times a Week., Disp: , Rfl:   •  budesonide-formoterol (SYMBICORT) 160-4.5 MCG/ACT inhaler, Inhale 2 puffs Daily As Needed (allergies)., Disp: 10.2 g, Rfl: 5  •  Calcium Carbonate-Vitamin D (CALTRATE 600+D PO), Take 1 tablet by mouth Daily., Disp: , Rfl:   •  Cholecalciferol (VITAMIN D) 1000 UNITS tablet, Take 1 capsule by mouth Daily., Disp: , Rfl:   •  fluticasone (FLONASE) 50 MCG/ACT nasal spray, 1 spray into each nostril Every Morning., Disp: , Rfl:   •  Glucosamine-Chondroit-Vit C-Mn (GLUCOSAMINE 1500 COMPLEX PO), Take 1 tablet by mouth daily., Disp: , Rfl:   •  loratadine (CLARITIN) 10 MG tablet, Take 1 tablet by mouth daily as needed., Disp: , Rfl:   •  melatonin 5 MG tablet tablet, Take 1 tablet by mouth every night., Disp: , Rfl:   •  montelukast (SINGULAIR) 10 MG tablet, Take 1 tablet by mouth Daily As Needed (During allergy season)., Disp: 90 tablet, Rfl: 1  •  Multiple Vitamins-Minerals (WOMENS ONE DAILY) tablet, Take 1 tablet by mouth daily., Disp: , Rfl:   •  traZODone (DESYREL) 50 MG tablet, Take 1 tablet by mouth At Night As Needed for Sleep., Disp: 30 tablet, Rfl: 1  •  vitamin C (ASCORBIC ACID) 500 MG tablet, Take 1 tablet by mouth daily., Disp: , Rfl:     Social History    Tobacco Use      Smoking status: Never Smoker      Smokeless tobacco: Never Used       Social History     Substance and Sexual Activity   Alcohol Use No       Caffeine: She has 2 cups of half calf half decaf coffee each morning she has 4-5 servings of decaf tea per day    Past Medical History:   Diagnosis Date   • Allergic rhinitis Lifelong    Moderately severe with nasal polyps   • Asthma 2011    Severe flare with bronchoscopy from [employer] needle  exposure   • CTS (carpal tunnel syndrome) 2004    Bilateral   • Eosinophilic esophagitis 2018    Biopsy-positive   • GERD (gastroesophageal reflux disease) 2016   • Lumbar scoliosis 2013    Recurrent discomfort   • Lumbar spinal stenosis 2013   • Migraine 2004    hospitalized   • Mononucleosis    • Osteoarthritis of hands, bilateral    • Post menopausal syndrome     Hot flashes with impaired sleep   • Sleep disorder    • Varicose vein of leg     venous ligation left leg       Past Surgical History:   Procedure Laterality Date   • BRONCHOSCOPY      Severe allergic pneumonitis   • COLONOSCOPY      Benign study   • LARYNGOSCOPY  2017    Indirect exam   • TONSILLECTOMY     • TONSILLECTOMY     • VEIN SURGERY Left     Ligation of left leg       Family History   Problem Relation Age of Onset   • Dementia Mother    • Hearing loss Mother    • Osteoarthritis Mother    • Esophageal cancer Father           age 60   • Achalasia Brother    • Osteoarthritis Brother    • Pancreatic cancer Brother    • Pancreatic cancer Maternal Grandmother    • Heart failure Paternal Grandmother    • Heart attack Paternal Grandfather          age 65   • Osteoarthritis Brother         Bilateral hip replacements   • Breast cancer Neg Hx    • Ovarian cancer Neg Hx        The following portions of the patient's history were reviewed and updated as appropriate: allergies, current medications, past family history, past medical history, past social history, past surgical history and problem list.    Review of Systems   Constitutional: Negative.    HENT: Positive for hearing loss and postnasal drip.    Eyes: Negative.    Respiratory: Negative.    Cardiovascular: Negative.    Gastrointestinal: Negative.    Endocrine: Negative.    Genitourinary: Negative.    Musculoskeletal: Positive for arthralgias.   Skin: Negative.    Allergic/Immunologic: Positive for environmental allergies.   Neurological: Negative.    Hematological:  "Negative.    Psychiatric/Behavioral: Negative.     Burdine score 6/24    Objective     BP 98/56   Pulse 56   Ht 166.4 cm (65.5\")   Wt 59.5 kg (131 lb 3.2 oz)   LMP  (LMP Unknown)   SpO2 98%   BMI 21.50 kg/m²      Physical Exam   Constitutional: She is oriented to person, place, and time. She appears well-developed and well-nourished.   HENT:   Head: Normocephalic and atraumatic.   She has nasal airway narrowing and Mallampati class for anatomy.   Eyes: EOM are normal. Pupils are equal, round, and reactive to light.   Neck: Normal range of motion. Neck supple.   Cardiovascular: Normal rate, regular rhythm and normal heart sounds.   Pulmonary/Chest: Effort normal and breath sounds normal.   Abdominal: Soft. Bowel sounds are normal.   Musculoskeletal: Normal range of motion. She exhibits no edema.   Neurological: She is alert and oriented to person, place, and time.   Skin: Skin is warm and dry.   Psychiatric: She has a normal mood and affect. Her behavior is normal.         Assessment/Plan   Tatyana was seen today for sleeping problem.    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  She began making expiratory noises while sleeping roughly 8 years ago that gradually gotten worse.  I think she is describing catathrenia.  She is certainly not overweight and denies any history of snoring.  We will plan to proceed to polysomnogram to further evaluate this parasomnia.  She may be a candidate for some sedative therapy to try to control this although it is sometimes apparently difficult to treat.  She is to return then after her study.  She is encouraged to maintain ideal body weight.  She is encouraged to avoid alcohol close to bedtime.  She is encouraged practice lateral position sleep.         Sha Iyer MD [facility]  Sleep Medicine  Pulmonary and Critical Care " Medicine

## 2019-03-11 ENCOUNTER — TELEPHONE (OUTPATIENT)
Dept: FAMILY MEDICINE CLINIC | Facility: CLINIC | Age: 69
End: 2019-03-11

## 2019-03-11 NOTE — TELEPHONE ENCOUNTER
Patient called wanting to thank Dr Ragland for his help last Saturday and to let him know she is feeling better, it took around 4 days

## 2019-03-21 ENCOUNTER — HOSPITAL ENCOUNTER (OUTPATIENT)
Dept: SLEEP MEDICINE | Facility: HOSPITAL | Age: 69
Discharge: HOME | End: 2019-03-21
Admitting: INTERNAL MEDICINE

## 2019-03-21 VITALS
BODY MASS INDEX: 21.01 KG/M2 | HEART RATE: 66 BPM | HEIGHT: 66 IN | SYSTOLIC BLOOD PRESSURE: 102 MMHG | WEIGHT: 130.73 LBS | DIASTOLIC BLOOD PRESSURE: 56 MMHG

## 2019-03-21 DIAGNOSIS — G47.54 PARASOMNIA IN CONDITIONS CLASSIFIED ELSEWHERE: ICD-10-CM

## 2019-03-21 PROCEDURE — 95810 POLYSOM 6/> YRS 4/> PARAM: CPT | Performed by: INTERNAL MEDICINE

## 2019-03-21 PROCEDURE — 95810 POLYSOM 6/> YRS 4/> PARAM: CPT

## 2019-03-22 ENCOUNTER — LAB (OUTPATIENT)
Dept: LAB | Facility: HOSPITAL | Age: 69
End: 2019-03-22

## 2019-03-22 ENCOUNTER — HOSPITAL ENCOUNTER (OUTPATIENT)
Dept: GENERAL RADIOLOGY | Facility: HOSPITAL | Age: 69
Discharge: HOME | End: 2019-03-22
Admitting: FAMILY MEDICINE

## 2019-03-22 ENCOUNTER — OFFICE VISIT (OUTPATIENT)
Dept: FAMILY MEDICINE CLINIC | Facility: CLINIC | Age: 69
End: 2019-03-22

## 2019-03-22 VITALS
WEIGHT: 128.2 LBS | HEART RATE: 59 BPM | TEMPERATURE: 97.1 F | RESPIRATION RATE: 16 BRPM | HEIGHT: 66 IN | DIASTOLIC BLOOD PRESSURE: 62 MMHG | OXYGEN SATURATION: 98 % | SYSTOLIC BLOOD PRESSURE: 112 MMHG | BODY MASS INDEX: 20.6 KG/M2

## 2019-03-22 DIAGNOSIS — M25.551 RIGHT HIP PAIN: ICD-10-CM

## 2019-03-22 DIAGNOSIS — E78.5 DYSLIPIDEMIA: ICD-10-CM

## 2019-03-22 DIAGNOSIS — Z00.00 WELL ADULT EXAM: Primary | ICD-10-CM

## 2019-03-22 DIAGNOSIS — G47.9 DYSSOMNIA: ICD-10-CM

## 2019-03-22 DIAGNOSIS — E55.9 VITAMIN D DEFICIENCY: ICD-10-CM

## 2019-03-22 DIAGNOSIS — J45.20 MILD INTERMITTENT ASTHMA WITHOUT COMPLICATION: ICD-10-CM

## 2019-03-22 DIAGNOSIS — J30.2 SEASONAL ALLERGIC RHINITIS, UNSPECIFIED TRIGGER: ICD-10-CM

## 2019-03-22 DIAGNOSIS — Z00.00 WELL ADULT EXAM: ICD-10-CM

## 2019-03-22 DIAGNOSIS — Z82.49 FAMILY HISTORY OF HEART DISEASE: ICD-10-CM

## 2019-03-22 DIAGNOSIS — G47.33 OSA (OBSTRUCTIVE SLEEP APNEA): Primary | ICD-10-CM

## 2019-03-22 LAB
25(OH)D3 SERPL-MCNC: 69.8 NG/ML
ALBUMIN SERPL-MCNC: 4.64 G/DL (ref 3.2–4.8)
ALBUMIN/GLOB SERPL: 2.1 G/DL (ref 1.5–2.5)
ALP SERPL-CCNC: 59 U/L (ref 25–100)
ALT SERPL W P-5'-P-CCNC: 29 U/L (ref 7–40)
ANION GAP SERPL CALCULATED.3IONS-SCNC: 7 MMOL/L (ref 3–11)
ARTICHOKE IGE QN: 108 MG/DL (ref 0–130)
AST SERPL-CCNC: 32 U/L (ref 0–33)
BASOPHILS # BLD AUTO: 0.04 10*3/MM3 (ref 0–0.2)
BASOPHILS NFR BLD AUTO: 1.2 % (ref 0–1)
BILIRUB SERPL-MCNC: 0.6 MG/DL (ref 0.3–1.2)
BILIRUB UR QL STRIP: NEGATIVE
BUN BLD-MCNC: 17 MG/DL (ref 9–23)
BUN/CREAT SERPL: 22.7 (ref 7–25)
CALCIUM SPEC-SCNC: 9.7 MG/DL (ref 8.7–10.4)
CHLORIDE SERPL-SCNC: 106 MMOL/L (ref 99–109)
CHOLEST SERPL-MCNC: 184 MG/DL (ref 0–200)
CLARITY UR: CLEAR
CO2 SERPL-SCNC: 27 MMOL/L (ref 20–31)
COLOR UR: YELLOW
CREAT BLD-MCNC: 0.75 MG/DL (ref 0.6–1.3)
CRP SERPL-MCNC: 0.09 MG/DL (ref 0–1)
DEPRECATED RDW RBC AUTO: 43.2 FL (ref 37–54)
EOSINOPHIL # BLD AUTO: 0.22 10*3/MM3 (ref 0–0.3)
EOSINOPHIL NFR BLD AUTO: 6.5 % (ref 0–3)
ERYTHROCYTE [DISTWIDTH] IN BLOOD BY AUTOMATED COUNT: 13.2 % (ref 11.3–14.5)
GFR SERPL CREATININE-BSD FRML MDRD: 77 ML/MIN/1.73
GLOBULIN UR ELPH-MCNC: 2.3 GM/DL
GLUCOSE BLD-MCNC: 82 MG/DL (ref 70–100)
GLUCOSE UR STRIP-MCNC: NEGATIVE MG/DL
HBA1C MFR BLD: 5 % (ref 4.8–5.6)
HCT VFR BLD AUTO: 43.3 % (ref 34.5–44)
HDLC SERPL-MCNC: 68 MG/DL (ref 40–60)
HGB BLD-MCNC: 14.2 G/DL (ref 11.5–15.5)
HGB UR QL STRIP.AUTO: NEGATIVE
IMM GRANULOCYTES # BLD AUTO: 0.01 10*3/MM3 (ref 0–0.05)
IMM GRANULOCYTES NFR BLD AUTO: 0.3 % (ref 0–0.6)
KETONES UR QL STRIP: NEGATIVE
LEUKOCYTE ESTERASE UR QL STRIP.AUTO: NEGATIVE
LYMPHOCYTES # BLD AUTO: 1.13 10*3/MM3 (ref 0.6–4.8)
LYMPHOCYTES NFR BLD AUTO: 33.6 % (ref 24–44)
MCH RBC QN AUTO: 29.4 PG (ref 27–31)
MCHC RBC AUTO-ENTMCNC: 32.8 G/DL (ref 32–36)
MCV RBC AUTO: 89.6 FL (ref 80–99)
MONOCYTES # BLD AUTO: 0.32 10*3/MM3 (ref 0–1)
MONOCYTES NFR BLD AUTO: 9.5 % (ref 0–12)
NEUTROPHILS # BLD AUTO: 1.64 10*3/MM3 (ref 1.5–8.3)
NEUTROPHILS NFR BLD AUTO: 48.9 % (ref 41–71)
NITRITE UR QL STRIP: NEGATIVE
PH UR STRIP.AUTO: 7.5 [PH] (ref 5–8)
PLATELET # BLD AUTO: 200 10*3/MM3 (ref 150–450)
PMV BLD AUTO: 10.4 FL (ref 6–12)
POTASSIUM BLD-SCNC: 4 MMOL/L (ref 3.5–5.5)
PROT SERPL-MCNC: 6.9 G/DL (ref 5.7–8.2)
PROT UR QL STRIP: NEGATIVE
RBC # BLD AUTO: 4.83 10*6/MM3 (ref 3.89–5.14)
SODIUM BLD-SCNC: 140 MMOL/L (ref 132–146)
SP GR UR STRIP: 1.02 (ref 1–1.03)
TRIGL SERPL-MCNC: 43 MG/DL (ref 0–150)
TSH SERPL DL<=0.05 MIU/L-ACNC: 2.32 MIU/ML (ref 0.35–5.35)
UROBILINOGEN UR QL STRIP: NORMAL
WBC NRBC COR # BLD: 3.36 10*3/MM3 (ref 3.5–10.8)

## 2019-03-22 PROCEDURE — 73502 X-RAY EXAM HIP UNI 2-3 VIEWS: CPT

## 2019-03-22 PROCEDURE — 83036 HEMOGLOBIN GLYCOSYLATED A1C: CPT | Performed by: FAMILY MEDICINE

## 2019-03-22 PROCEDURE — 93000 ELECTROCARDIOGRAM COMPLETE: CPT | Performed by: FAMILY MEDICINE

## 2019-03-22 PROCEDURE — 80053 COMPREHEN METABOLIC PANEL: CPT | Performed by: FAMILY MEDICINE

## 2019-03-22 PROCEDURE — 82785 ASSAY OF IGE: CPT | Performed by: FAMILY MEDICINE

## 2019-03-22 PROCEDURE — 86140 C-REACTIVE PROTEIN: CPT | Performed by: FAMILY MEDICINE

## 2019-03-22 PROCEDURE — 84443 ASSAY THYROID STIM HORMONE: CPT | Performed by: FAMILY MEDICINE

## 2019-03-22 PROCEDURE — 80061 LIPID PANEL: CPT | Performed by: FAMILY MEDICINE

## 2019-03-22 PROCEDURE — 82306 VITAMIN D 25 HYDROXY: CPT | Performed by: FAMILY MEDICINE

## 2019-03-22 PROCEDURE — 99397 PER PM REEVAL EST PAT 65+ YR: CPT | Performed by: FAMILY MEDICINE

## 2019-03-22 PROCEDURE — 85025 COMPLETE CBC W/AUTO DIFF WBC: CPT | Performed by: FAMILY MEDICINE

## 2019-03-22 PROCEDURE — 81003 URINALYSIS AUTO W/O SCOPE: CPT | Performed by: FAMILY MEDICINE

## 2019-03-22 RX ORDER — MONTELUKAST SODIUM 10 MG/1
10 TABLET ORAL DAILY PRN
Qty: 90 TABLET | Refills: 1 | Status: SHIPPED | OUTPATIENT
Start: 2019-03-22 | End: 2020-06-22 | Stop reason: SDUPTHER

## 2019-03-22 RX ORDER — MONTELUKAST SODIUM 10 MG/1
10 TABLET ORAL DAILY PRN
Qty: 90 TABLET | Refills: 1 | Status: SHIPPED | OUTPATIENT
Start: 2019-03-22 | End: 2019-03-22 | Stop reason: SDUPTHER

## 2019-03-22 NOTE — PROGRESS NOTES
"Subjective   Tatyana Willett is a 68 y.o. female.     Chief Complaint   Patient presents with   • Annual Exam       History of Present Illness     Chronic health conditions:  Asthma: Stable, uses Singulair seasonally  Lumbar spine degenerative disc disease: Stable, controlled with exercise  Dyssomnia: Managed well on melatonin and trazodone. She reports that she has been having worse sleep recently due to hip pain at night. She also states she \"moans\" when she sleeps, had a sleep study conducted last night.  GERD: Stable, recent EGD with biopsy-confirmed eosinophilic esophagitis    Acute complaints:  60-year-old female presents today for annual exam. She reports no change sto her health over the past year and feels she is doing well.    The following portions of the patient's history were reviewed and updated as appropriate: allergies, current medications, past family history, past medical history, past social history, past surgical history and problem list.    Active Ambulatory Problems     Diagnosis Date Noted   • Atopic rhinitis 05/13/2016   • Asthma 05/13/2016   • Ingrown toenail 05/13/2016   • Osteoarthritis of lumbar spine 05/13/2016   • Primary localized osteoarthrosis of hand 05/13/2016   • Scoliosis 05/13/2016   • Dyssomnia 05/13/2016   • Preventative health care 09/07/2016   • Low back pain 09/07/2016   • Throat pain 01/26/2018   • GERD (gastroesophageal reflux disease) 01/26/2018   • Family history of heart disease 01/26/2018   • Eosinophilic esophagitis 02/16/2018   • Acute arytenoiditis 02/16/2018   • Parasomnia in conditions classified elsewhere 02/06/2019     Resolved Ambulatory Problems     Diagnosis Date Noted   • Cellulitis 05/13/2016   • Ethmoid sinusitis 05/13/2016   • Acute sinusitis 08/14/2017   • Acute non-recurrent maxillary sinusitis 10/19/2017   • Cervical lymphadenitis 10/19/2017     Past Medical History:   Diagnosis Date   • Allergic rhinitis Lifelong   • Asthma 2011   • CTS (carpal " tunnel syndrome)    • Eosinophilic esophagitis 2018   • GERD (gastroesophageal reflux disease) 2016   • Lumbar scoliosis    • Lumbar spinal stenosis    • Migraine    • Mononucleosis    • Osteoarthritis of hands, bilateral    • Post menopausal syndrome    • Sleep disorder    • Varicose vein of leg      Past Surgical History:   Procedure Laterality Date   • BRONCHOSCOPY      Severe allergic pneumonitis   • COLONOSCOPY      Benign study   • LARYNGOSCOPY  2017    Indirect exam   • TONSILLECTOMY     • TONSILLECTOMY     • VEIN SURGERY Left     Ligation of left leg     Family History   Problem Relation Age of Onset   • Dementia Mother    • Hearing loss Mother    • Osteoarthritis Mother    • Esophageal cancer Father           age 60   • Achalasia Brother    • Osteoarthritis Brother    • Pancreatic cancer Brother    • Pancreatic cancer Maternal Grandmother    • Heart failure Paternal Grandmother    • Heart attack Paternal Grandfather          age 65   • Osteoarthritis Brother         Bilateral hip replacements   • Breast cancer Neg Hx    • Ovarian cancer Neg Hx      Social History     Socioeconomic History   • Marital status:      Spouse name: Not on file   • Number of children: Not on file   • Years of education: Not on file   • Highest education level: Not on file   Tobacco Use   • Smoking status: Never Smoker   • Smokeless tobacco: Never Used   Substance and Sexual Activity   • Alcohol use: No   • Drug use: No   Social History Narrative    Domestic life   lives in private home with         L[employer]-day   B[employer]        Sleep hygiene   some sleep impairment from back pain        Caffeine use   1 cups half-and-half coffee daily        Exercise habits    walks daily.  Back exercises regularly.  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    HENT: Negative.    Eyes: Negative.    Respiratory: Negative for cough, chest tightness, shortness of breath and wheezing.    Cardiovascular: Negative for chest pain and palpitations.   Gastrointestinal: Negative for abdominal distention, abdominal pain, constipation, diarrhea, nausea and vomiting.        Recent GI illness, now resolved   Musculoskeletal: Negative for gait problem and joint swelling.        Persistent right hip pain   Skin: Negative for color change and wound.   Psychiatric/Behavioral: Negative for agitation and hallucinations.       Objective   Blood pressure 112/62, pulse 59, temperature 97.1 °F (36.2 °C), temperature source Temporal, resp. rate 16, height 166.4 cm (65.5\"), weight 58.2 kg (128 lb 3.2 oz), SpO2 98 %, not currently breastfeeding.  Nursing note reviewed  Physical Exam  General: Patient is well-nourished, well-developed, and in no acute distress.  HEENT: Normocephalic with no contusions noted, no ptosis or palsy. Pupils equally round and reactive to light, extraocular movements intact. Patient holds steady gaze, can follow to midline. Hearing grossly normal without deficit, exterior auricles normal, tympanic membranes normal without erythema or bulging.  Lymphatic: Posterior auricular, cervical, submandibular, supraclavicular, axillary lymphatic sites palpated without abnormality  Cardiovascular: Normal study rate without ectopy. PMI palpated, normal. Normal S1, S2. No murmurs rubs or gallops.  Respiratory: No tenderness to palpation on the chest wall, lungs clear to auscultation bilaterally, no wheezes, rales, or rhonchi. No pleural friction rubs.  Gastrointestinal: Bowel sounds present, normoactive globally. No bruit noted. Nontender, nondistended. Normal percussive exam, no hepatomegaly, no " splenomegaly. No hernias, scars, gross lesions.  Extremities: No cyanosis or edema, 2+ pulses bilaterally, reflexes normal. Capillary refill time normal.  MSK: Normal gait and station. She has mild TTP over right glut medius insertion at right greater trochanter. ROM right hip is full and symmetric with left. DREA/FADIR negative. Posterior impingement signs positive  Neuro: Cranial nerves II-XII grossly intact. Patient alert and oriented x3.  Psych: PHQ2 negative    ECG 12 Lead  Date/Time: 3/22/2019 7:42 AM  Performed by: Stepan Ragland DO  Authorized by: Stepan Ragland DO   Comparison: compared with previous ECG from 1/26/2018  Similar to previous ECG  Rhythm: sinus bradycardia  Rate: normal  Conduction: non-specific intraventricular conduction delay  ST Segments: ST segments normal  T Waves: T waves normal  QRS axis: normal  Other: no other findings    Clinical impression: abnormal EKG          Assessment/Plan   Tatyana was seen today for annual exam.    Diagnoses and all orders for this visit:    Well adult exam  -     ECG 12 Lead  -     Comprehensive Metabolic Panel; Future  -     CBC & Differential; Future  -     C-reactive Protein; Future  -     Urinalysis With Microscopic If Indicated (No Culture) - Urine, Clean Catch; Future  -     TSH; Future  -     Hemoglobin A1c; Future  -     Lipid Panel; Future    Family history of heart disease  -     ECG 12 Lead  -     C-reactive Protein; Future    Dyssomnia    Mild intermittent asthma without complication  -     C-reactive Protein; Future  -     IgE; Future  -     Discontinue: montelukast (SINGULAIR) 10 MG tablet; Take 1 tablet by mouth Daily As Needed (During allergy season).  -     montelukast (SINGULAIR) 10 MG tablet; Take 1 tablet by mouth Daily As Needed (During allergy season).    Dyslipidemia  -     Comprehensive Metabolic Panel; Future  -     Lipid Panel; Future    Vitamin D deficiency  -     Vitamin D 25 Hydroxy; Future    Seasonal allergic  "rhinitis, unspecified trigger  -     Discontinue: montelukast (SINGULAIR) 10 MG tablet; Take 1 tablet by mouth Daily As Needed (During allergy season).  -     montelukast (SINGULAIR) 10 MG tablet; Take 1 tablet by mouth Daily As Needed (During allergy season).    Right hip pain  Comments:  DAVID vs OA. Possibly some contribution from glut medius tendinitis.  Orders:  -     XR Hip With or Without Pelvis 2 - 3 View Right; Future      #1 Annual exam  Up-to-date on all vaccinations.  Health maintenance otherwise UTD    #2 Right hip pain  XR today  Exercises given  May need formal PT    Asthma: Stable, uses Singulair seasonally. Refill sent today.  Lumbar spine degenerative disc disease: Stable, controlled with exercise  Dyssomnia: Managed well on melatonin and trazodone. She reports that she has been having worse sleep recently due to hip pain at night. She also states she \"moans\" when she sleeps, had a sleep study conducted last night. Pending results.  GERD: Stable, recent EGD with biopsy-confirmed eosinophilic esophagitis  Vitamin D deficiency: Stable on supplementation  Dyslipidemia: Lipid profile ordered today.    Patient Instructions   1.  Continue medications and supplements - as listed.    2.  Continue well-balanced diet - low in calories and low in added sugar.    3.  Maintain a routine exercise program - every week.    4.  A letter will be sent with your test results.      Ambulatory progress note signed and attested to by Stepan Ragland D.O.           "

## 2019-03-22 NOTE — PATIENT INSTRUCTIONS
1.  Continue medications and supplements - as listed.    2.  Continue well-balanced diet - low in calories and low in added sugar.    3.  Maintain a routine exercise program - every week.    4.  A letter will be sent with your test results.

## 2019-03-27 ENCOUNTER — TELEPHONE (OUTPATIENT)
Dept: SLEEP MEDICINE | Facility: HOSPITAL | Age: 69
End: 2019-03-27

## 2019-03-27 ENCOUNTER — OFFICE VISIT (OUTPATIENT)
Dept: SLEEP MEDICINE | Facility: HOSPITAL | Age: 69
End: 2019-03-27

## 2019-03-27 VITALS
HEIGHT: 66 IN | DIASTOLIC BLOOD PRESSURE: 57 MMHG | SYSTOLIC BLOOD PRESSURE: 94 MMHG | OXYGEN SATURATION: 98 % | BODY MASS INDEX: 21.38 KG/M2 | WEIGHT: 133 LBS | HEART RATE: 63 BPM

## 2019-03-27 DIAGNOSIS — G47.33 OSA (OBSTRUCTIVE SLEEP APNEA): Primary | ICD-10-CM

## 2019-03-27 PROCEDURE — 99213 OFFICE O/P EST LOW 20 MIN: CPT | Performed by: NURSE PRACTITIONER

## 2019-03-27 RX ORDER — LEVOFLOXACIN 500 MG/1
TABLET, FILM COATED ORAL
COMMUNITY
Start: 2019-03-02 | End: 2019-10-24

## 2019-03-27 NOTE — TELEPHONE ENCOUNTER
PATIENT REQUESTED RETURN CALL REGARDING SEVERAL QUESTIONS THAT SHE HAS REGARDING THE SUCCESS RATE OF ORAL APPLIANCE VS PAP THERAPY    PATIENT REQUESTED A RETURN CALL FROM YENY OR TO EMAIL HER AT [email or web address]

## 2019-03-27 NOTE — PATIENT INSTRUCTIONS

## 2019-03-27 NOTE — PROGRESS NOTES
Subjective: Follow-up        Chief Complaint:   Chief Complaint   Patient presents with   • Follow-up       HPI:    Tatyana Willett is a 68 y.o. female here for follow-up of study results.    Patient states on night of study she slept fairly well. 8 years ago she started being noticed to have moaning when she was sleeping these are expiratory noises that she makes.  It's been gradually getting worse.  She is making some much noise now that she sleeps in a separate room from her .  She does not note any snoring.  He says she's usually sleeping on her stomach or her side.  She lost about 20 pounds 2 years ago with effort.  She did not change any medications recently.  She has a prescription for trazodone to help her sleep but she rarely takes it.  She does take melatonin recently.  She sometimes will fall asleep if sitting quietly during the day.  She denies any problems while driving.     Patient is currently sleeping 7-7-1/2 hours nightly and does feel rested upon awakening.  Patient has an Salvo score of 6/24.  Patient did have a sleep study on 3/21/2019 that showed mild obstructive sleep apnea.  No moaning was noted no leg movements noted.    Patient understands consequences of untreated sleep apnea and wishes to move forward with oral mandibular advancement device.  Current medications are:   Current Outpatient Medications:   •  albuterol (PROAIR HFA) 108 (90 Base) MCG/ACT inhaler, Inhale 1 puff Daily As Needed for Shortness of Air., Disp: 8.5 inhaler, Rfl: 5  •  aspirin 81 MG tablet, Take 1 tablet by mouth 3 (Three) Times a Week., Disp: , Rfl:   •  budesonide-formoterol (SYMBICORT) 160-4.5 MCG/ACT inhaler, Inhale 2 puffs Daily As Needed (allergies)., Disp: 10.2 g, Rfl: 5  •  Calcium Carbonate-Vitamin D (CALTRATE 600+D PO), Take 1 tablet by mouth Daily., Disp: , Rfl:   •  Cholecalciferol (VITAMIN D) 1000 UNITS tablet, Take 1 capsule by mouth Daily., Disp: , Rfl:   •  fluticasone (FLONASE) 50 MCG/ACT  nasal spray, 1 spray into each nostril Every Morning., Disp: , Rfl:   •  Glucosamine-Chondroit-Vit C-Mn (GLUCOSAMINE 1500 COMPLEX PO), Take 1 tablet by mouth daily., Disp: , Rfl:   •  loratadine (CLARITIN) 10 MG tablet, Take 1 tablet by mouth daily as needed., Disp: , Rfl:   •  melatonin 5 MG tablet tablet, Take 1 tablet by mouth every night., Disp: , Rfl:   •  montelukast (SINGULAIR) 10 MG tablet, Take 1 tablet by mouth Daily As Needed (During allergy season)., Disp: 90 tablet, Rfl: 1  •  Multiple Vitamins-Minerals (WOMENS ONE DAILY) tablet, Take 1 tablet by mouth daily., Disp: , Rfl:   •  traZODone (DESYREL) 50 MG tablet, Take 1 tablet by mouth At Night As Needed for Sleep., Disp: 30 tablet, Rfl: 1  •  vitamin C (ASCORBIC ACID) 500 MG tablet, Take 1 tablet by mouth daily., Disp: , Rfl:   •  levoFLOXacin (LEVAQUIN) 500 MG tablet, , Disp: , Rfl: .      The patient's relevant past medical, surgical, family and social history were reviewed and updated in Epic as appropriate.       Review of Systems   HENT: Positive for hearing loss.    Respiratory: Positive for apnea.    Musculoskeletal: Positive for arthralgias.   Allergic/Immunologic: Positive for environmental allergies.   Psychiatric/Behavioral: Positive for sleep disturbance.   All other systems reviewed and are negative.        Objective:    Physical Exam   Constitutional: She is oriented to person, place, and time. She appears well-developed and well-nourished.   HENT:   Head: Normocephalic and atraumatic.   Mouth/Throat: Oropharynx is clear and moist.   Mallampati 4 anatomy   Eyes: Conjunctivae are normal.   Neck: Neck supple. No thyromegaly present.   Cardiovascular: Normal rate and regular rhythm.   Pulmonary/Chest: Effort normal and breath sounds normal.   Lymphadenopathy:     She has no cervical adenopathy.   Neurological: She is alert and oriented to person, place, and time.   Skin: Skin is warm and dry.   Psychiatric: She has a normal mood and affect.  Her behavior is normal. Judgment and thought content normal.   Nursing note and vitals reviewed.        ASSESSMENT/PLAN    Tatyana was seen today for follow-up.    Diagnoses and all orders for this visit:    ANNETTA (obstructive sleep apnea)  -      Mandibular Advancement Device (custom fabricated)            1. Counseled patient regarding multimodal approach with healthy nutrition, healthy sleep, regular physical activity, social activities, counseling, and medications. Encouraged to practice lateral sleep position. Avoid alcohol and sedatives close to bedtime.  2.   Order has been given to patient for oral mandibular advancement device.  Patient is going to take disorder to her dentist to see how much is going to cost her.  She will follow-up here in 4 months and you will get an overnight oximetry while wearing her device.  I have reviewed the results of my evaluation and impression and discussed my recommendations in detail with the patient.      Signed by  NAI Torres    March 27, 2019      CC: Stepan Ragland,           No ref. provider found

## 2019-03-29 LAB — TOTAL IGE SMQN RAST: 254 IU/ML (ref 6–495)

## 2019-07-12 ENCOUNTER — TRANSCRIBE ORDERS (OUTPATIENT)
Dept: FAMILY MEDICINE CLINIC | Facility: CLINIC | Age: 69
End: 2019-07-12

## 2019-07-12 DIAGNOSIS — Z12.31 VISIT FOR SCREENING MAMMOGRAM: Primary | ICD-10-CM

## 2019-10-24 ENCOUNTER — OFFICE VISIT (OUTPATIENT)
Dept: FAMILY MEDICINE CLINIC | Facility: CLINIC | Age: 69
End: 2019-10-24

## 2019-10-24 VITALS
SYSTOLIC BLOOD PRESSURE: 122 MMHG | HEIGHT: 66 IN | WEIGHT: 135 LBS | DIASTOLIC BLOOD PRESSURE: 78 MMHG | HEART RATE: 83 BPM | OXYGEN SATURATION: 99 % | BODY MASS INDEX: 21.69 KG/M2

## 2019-10-24 DIAGNOSIS — H90.71 MIXED CONDUCTIVE AND SENSORINEURAL HEARING LOSS OF RIGHT EAR WITH UNRESTRICTED HEARING OF LEFT EAR: Primary | ICD-10-CM

## 2019-10-24 PROCEDURE — 99213 OFFICE O/P EST LOW 20 MIN: CPT | Performed by: FAMILY MEDICINE

## 2019-10-25 ENCOUNTER — HOSPITAL ENCOUNTER (OUTPATIENT)
Dept: MAMMOGRAPHY | Facility: HOSPITAL | Age: 69
Discharge: HOME | End: 2019-10-25
Admitting: FAMILY MEDICINE

## 2019-10-25 DIAGNOSIS — Z12.31 VISIT FOR SCREENING MAMMOGRAM: ICD-10-CM

## 2019-10-25 PROCEDURE — 77067 SCR MAMMO BI INCL CAD: CPT

## 2019-10-25 PROCEDURE — 77067 SCR MAMMO BI INCL CAD: CPT | Performed by: RADIOLOGY

## 2019-10-25 PROCEDURE — 77063 BREAST TOMOSYNTHESIS BI: CPT

## 2019-10-25 PROCEDURE — 77063 BREAST TOMOSYNTHESIS BI: CPT | Performed by: RADIOLOGY

## 2019-11-01 ENCOUNTER — OFFICE VISIT (OUTPATIENT)
Dept: SLEEP MEDICINE | Facility: HOSPITAL | Age: 69
End: 2019-11-01

## 2019-11-01 VITALS
WEIGHT: 136.8 LBS | DIASTOLIC BLOOD PRESSURE: 60 MMHG | BODY MASS INDEX: 21.98 KG/M2 | HEART RATE: 63 BPM | SYSTOLIC BLOOD PRESSURE: 98 MMHG | OXYGEN SATURATION: 98 % | HEIGHT: 66 IN

## 2019-11-01 DIAGNOSIS — G47.33 OSA (OBSTRUCTIVE SLEEP APNEA): Primary | ICD-10-CM

## 2019-11-01 PROCEDURE — 99213 OFFICE O/P EST LOW 20 MIN: CPT | Performed by: NURSE PRACTITIONER

## 2019-11-01 NOTE — PROGRESS NOTES
Chief Complaint:   Chief Complaint   Patient presents with   • Follow-up       HPI:    Tatyana Willett is a 69 y.o. female here for follow-up of sleep apnea.  Patient was last seen 3/27/2019 for mild obstructive sleep apnea.  At that time patient decided to move forward with oral mandibular advancement device.  She did speak to her dentist about this but is now leaning towards starting CPAP.  We discussed in detail benefits of CPAP versus oral device.  Patient continues to have nonrestorative sleep and light moaning.  Patient is sleeping 5 to 8 hours nightly and has an Bybee score of 6/24.        Current medications are:   Current Outpatient Medications:   •  albuterol (PROAIR HFA) 108 (90 Base) MCG/ACT inhaler, Inhale 1 puff Daily As Needed for Shortness of Air., Disp: 8.5 inhaler, Rfl: 5  •  aspirin 81 MG tablet, Take 1 tablet by mouth 2 (Two) Times a Day., Disp: , Rfl:   •  budesonide-formoterol (SYMBICORT) 160-4.5 MCG/ACT inhaler, Inhale 2 puffs Daily As Needed (allergies)., Disp: 10.2 g, Rfl: 5  •  Calcium Carbonate-Vitamin D (CALTRATE 600+D PO), Take 1 tablet by mouth Daily., Disp: , Rfl:   •  Cholecalciferol (VITAMIN D) 1000 UNITS tablet, Take 1 capsule by mouth Daily., Disp: , Rfl:   •  fluticasone (FLONASE) 50 MCG/ACT nasal spray, 1 spray into each nostril Every Morning., Disp: , Rfl:   •  Glucosamine-Chondroit-Vit C-Mn (GLUCOSAMINE 1500 COMPLEX PO), Take 1 tablet by mouth daily., Disp: , Rfl:   •  loratadine (CLARITIN) 10 MG tablet, Take 1 tablet by mouth daily as needed., Disp: , Rfl:   •  montelukast (SINGULAIR) 10 MG tablet, Take 1 tablet by mouth Daily As Needed (During allergy season)., Disp: 90 tablet, Rfl: 1  •  Multiple Vitamins-Minerals (WOMENS ONE DAILY) tablet, Take 1 tablet by mouth daily., Disp: , Rfl:   •  vitamin C (ASCORBIC ACID) 500 MG tablet, Take 1 tablet by mouth daily., Disp: , Rfl:   •  melatonin 5 MG tablet tablet, Take 1 tablet by mouth every night., Disp: , Rfl:   •   traZODone (DESYREL) 50 MG tablet, Take 1 tablet by mouth At Night As Needed for Sleep., Disp: 30 tablet, Rfl: 1.      The patient's relevant past medical, surgical, family and social history were reviewed and updated in Epic as appropriate.       Review of Systems   Constitutional: Positive for fatigue.   Eyes: Positive for visual disturbance.   Respiratory: Positive for apnea.    Gastrointestinal:        Heartburn   Musculoskeletal: Positive for arthralgias.   Allergic/Immunologic: Positive for environmental allergies.   Psychiatric/Behavioral: Positive for sleep disturbance.   All other systems reviewed and are negative.        Objective:    Physical Exam   Constitutional: She is oriented to person, place, and time. She appears well-developed and well-nourished.   HENT:   Head: Normocephalic and atraumatic.   Mouth/Throat: Oropharynx is clear and moist.   Mallampati 4 anatomy   Eyes: Conjunctivae are normal.   Neck: Neck supple. No thyromegaly present.   Cardiovascular: Normal rate and regular rhythm.   Pulmonary/Chest: Effort normal and breath sounds normal.   Lymphadenopathy:     She has no cervical adenopathy.   Neurological: She is alert and oriented to person, place, and time.   Skin: Skin is warm and dry.   Psychiatric: She has a normal mood and affect. Her behavior is normal. Judgment and thought content normal.   Nursing note and vitals reviewed.        ASSESSMENT/PLAN    Tatyana was seen today for follow-up.    Diagnoses and all orders for this visit:    ANNETTA (obstructive sleep apnea)  -     PAP Therapy            1. Counseled patient regarding multimodal approach with healthy nutrition, healthy sleep, regular physical activity, social activities, counseling, and medications. Encouraged to practice lateral sleep position. Avoid alcohol and sedatives close to bedtime.  2. Patient does wish to initiate CPAP therapy we will fax an order to DME of patient's choosing us with settings of 8 to 18 cm H2 oh.  We  will see patient back in 31 to 90 days.    I have reviewed the results of my evaluation and impression and discussed my recommendations in detail with the patient.      Signed by  Corazon Medina, NAI    November 1, 2019      CC: Stepan Ragland DO          No ref. provider found

## 2019-11-01 NOTE — PATIENT INSTRUCTIONS
Sleep Apnea  Sleep apnea affects breathing during sleep. It causes breathing to stop for a short time or to become shallow. It can also increase the risk of:  · Heart attack.  · Stroke.  · Being very overweight (obese).  · Diabetes.  · Heart failure.  · Irregular heartbeat.  The goal of treatment is to help you breathe normally again.  What are the causes?  There are three kinds of sleep apnea:  · Obstructive sleep apnea. This is caused by a blocked or collapsed airway.  · Central sleep apnea. This happens when the brain does not send the right signals to the muscles that control breathing.  · Mixed sleep apnea. This is a combination of obstructive and central sleep apnea.  The most common cause of this condition is a collapsed or blocked airway. This can happen if:  · Your throat muscles are too relaxed.  · Your tongue and tonsils are too large.  · You are overweight.  · Your airway is too small.  What increases the risk?  · Being overweight.  · Smoking.  · Having a small airway.  · Being older.  · Being male.  · Drinking alcohol.  · Taking medicines to calm yourself (sedatives or tranquilizers).  · Having family members with the condition.  What are the signs or symptoms?  · Trouble staying asleep.  · Being sleepy or tired during the day.  · Getting angry a lot.  · Loud snoring.  · Headaches in the morning.  · Not being able to focus your mind (concentrate).  · Forgetting things.  · Less interest in sex.  · Mood swings.  · Personality changes.  · Feelings of sadness (depression).  · Waking up a lot during the night to pee (urinate).  · Dry mouth.  · Sore throat.  How is this diagnosed?  · Your medical history.  · A physical exam.  · A test that is done when you are sleeping (sleep study). The test is most often done in a sleep lab but may also be done at home.  How is this treated?    · Sleeping on your side.  · Using a medicine to get rid of mucus in your nose (decongestant).  · Avoiding the use of alcohol,  medicines to help you relax, or certain pain medicines (narcotics).  · Losing weight, if needed.  · Changing your diet.  · Not smoking.  · Using a machine to open your airway while you sleep, such as:  ? An oral appliance. This is a mouthpiece that shifts your lower jaw forward.  ? A CPAP device. This device blows air through a mask when you breathe out (exhale).  ? An EPAP device. This has valves that you put in each nostril.  ? A BPAP device. This device blows air through a mask when you breathe in (inhale) and breathe out.  · Having surgery if other treatments do not work.  It is important to get treatment for sleep apnea. Without treatment, it can lead to:  · High blood pressure.  · Coronary artery disease.  · In men, not being able to have an erection (impotence).  · Reduced thinking ability.  Follow these instructions at home:  Lifestyle  · Make changes that your doctor recommends.  · Eat a healthy diet.  · Lose weight if needed.  · Avoid alcohol, medicines to help you relax, and some pain medicines.  · Do not use any products that contain nicotine or tobacco, such as cigarettes, e-cigarettes, and chewing tobacco. If you need help quitting, ask your doctor.  General instructions  · Take over-the-counter and prescription medicines only as told by your doctor.  · If you were given a machine to use while you sleep, use it only as told by your doctor.  · If you are having surgery, make sure to tell your doctor you have sleep apnea. You may need to bring your device with you.  · Keep all follow-up visits as told by your doctor. This is important.  Contact a doctor if:  · The machine that you were given to use during sleep bothers you or does not seem to be working.  · You do not get better.  · You get worse.  Get help right away if:  · Your chest hurts.  · You have trouble breathing in enough air.  · You have an uncomfortable feeling in your back, arms, or stomach.  · You have trouble talking.  · One side of your  body feels weak.  · A part of your face is hanging down.  These symptoms may be an emergency. Do not wait to see if the symptoms will go away. Get medical help right away. Call your local emergency services (911 in the [place] Do not drive yourself to the hospital.  Summary  · This condition affects breathing during sleep.  · The most common cause is a collapsed or blocked airway.  · The goal of treatment is to help you breathe normally while you sleep.  This information is not intended to replace advice given to you by your health care provider. Make sure you discuss any questions you have with your health care provider.  Document Released: 09/26/2009 Document Revised: 08/13/2019 Document Reviewed: 08/13/2019  [employer] Interactive Patient Education © 2019 [employer]

## 2019-11-18 ENCOUNTER — HOSPITAL ENCOUNTER (OUTPATIENT)
Dept: MAMMOGRAPHY | Facility: HOSPITAL | Age: 69
Discharge: HOME | End: 2019-11-18
Admitting: RADIOLOGY

## 2019-11-18 DIAGNOSIS — R92.8 ABNORMAL MAMMOGRAM: ICD-10-CM

## 2019-11-18 PROCEDURE — 77061 BREAST TOMOSYNTHESIS UNI: CPT | Performed by: RADIOLOGY

## 2019-11-18 PROCEDURE — 77065 DX MAMMO INCL CAD UNI: CPT

## 2019-11-18 PROCEDURE — 77065 DX MAMMO INCL CAD UNI: CPT | Performed by: RADIOLOGY

## 2019-11-25 NOTE — PROGRESS NOTES
Subjective   Tatyana Willett is a 69 y.o. female.     Chief Complaint   Patient presents with   • Ear Fullness     right ear        History of Present Illness     Tatyana Willett presents today for   Chief Complaint   Patient presents with   • Ear Fullness     right ear      She reports fullness in the right ear with sensation of hearing loss.  She denies any tinnitus.  She denies any discharge.    This patient is accompanied by their self who contributes to the history of their care.    The following portions of the patient's history were reviewed and updated as appropriate: allergies, current medications, past family history, past medical history, past social history, past surgical history and problem list.    Active Ambulatory Problems     Diagnosis Date Noted   • Atopic rhinitis 05/13/2016   • Asthma 05/13/2016   • Ingrown toenail 05/13/2016   • Osteoarthritis of lumbar spine 05/13/2016   • Primary localized osteoarthrosis of hand 05/13/2016   • Scoliosis 05/13/2016   • Dyssomnia 05/13/2016   • Preventative health care 09/07/2016   • Low back pain 09/07/2016   • Throat pain 01/26/2018   • GERD (gastroesophageal reflux disease) 01/26/2018   • Family history of heart disease 01/26/2018   • Eosinophilic esophagitis 02/16/2018   • Acute arytenoiditis 02/16/2018   • Parasomnia in conditions classified elsewhere 02/06/2019   • ANNETTA (obstructive sleep apnea) 03/21/2019     Resolved Ambulatory Problems     Diagnosis Date Noted   • Cellulitis 05/13/2016   • Ethmoid sinusitis 05/13/2016   • Acute sinusitis 08/14/2017   • Acute non-recurrent maxillary sinusitis 10/19/2017   • Cervical lymphadenitis 10/19/2017     Past Medical History:   Diagnosis Date   • Allergic rhinitis Lifelong   • Asthma 2011   • CTS (carpal tunnel syndrome) 2004   • Eosinophilic esophagitis 2018   • GERD (gastroesophageal reflux disease) 2016   • Lumbar scoliosis 2013   • Lumbar spinal stenosis 2013   • Migraine 2004   • Mononucleosis 1962   •  Osteoarthritis of hands, bilateral    • Post menopausal syndrome    • Sleep disorder    • Varicose vein of leg      Past Surgical History:   Procedure Laterality Date   • BRONCHOSCOPY      Severe allergic pneumonitis   • COLONOSCOPY  2013    Benign study   • LARYNGOSCOPY  2017    Indirect exam   • TONSILLECTOMY     • TONSILLECTOMY     • VEIN SURGERY Left     Ligation of left leg     Family History   Problem Relation Age of Onset   • Dementia Mother    • Hearing loss Mother    • Osteoarthritis Mother    • Esophageal cancer Father           age 60   • Achalasia Brother    • Osteoarthritis Brother    • Pancreatic cancer Brother    • Pancreatic cancer Maternal Grandmother    • Heart failure Paternal Grandmother    • Heart attack Paternal Grandfather          age 65   • Osteoarthritis Brother         Bilateral hip replacements   • Breast cancer Neg Hx    • Ovarian cancer Neg Hx      Social History     Socioeconomic History   • Marital status:      Spouse name: Not on file   • Number of children: Not on file   • Years of education: Not on file   • Highest education level: Not on file   Tobacco Use   • Smoking status: Never Smoker   • Smokeless tobacco: Never Used   Substance and Sexual Activity   • Alcohol use: No   • Drug use: No   Social History Narrative    Domestic life   lives in private home with         Orthodoxy   [employer]        Sleep hygiene   some sleep impairment from back pain        Caffeine use   1 cups half-and-half coffee daily        Exercise habits    walks daily.  Back exercises regularly.  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  Nursing note reviewed  Physical Exam  Const: NAD, A&Ox4, Pleasant, Cooperative  Eyes: EOMI, no conjunctivitis  ENT: No nasal discharge present, neck supple  Cardiac: Regular rate and rhythm, no cyanosis  Resp: Respiratory rate within normal limits, no increased work of breathing, no audible wheezing or retractions noted  GI: No distention or ascites  Neurologic: CN II-XII grossly intact  Psych: Appropriate mood and behavior.  Skin: Warm, dry  Procedures  Assessment/Plan     Problem List Items Addressed This Visit     None      Visit Diagnoses     Mixed conductive and sensorineural hearing loss of right ear with unrestricted hearing of left ear    -  Primary    Relevant Orders    Ambulatory Referral to Audiology (Completed)          See patient diagnoses and orders along with patient instructions for assessment, plan, and changes to care for patient.    There are no Patient Instructions on file for this visit.    No Follow-up on file.    Ambulatory progress note signed and attested to by Stepan Ragland D.O.         "

## 2020-01-10 ENCOUNTER — OFFICE VISIT (OUTPATIENT)
Dept: SLEEP MEDICINE | Facility: HOSPITAL | Age: 70
End: 2020-01-10

## 2020-01-10 VITALS
WEIGHT: 140.2 LBS | HEART RATE: 65 BPM | DIASTOLIC BLOOD PRESSURE: 64 MMHG | TEMPERATURE: 97.8 F | BODY MASS INDEX: 22.98 KG/M2 | RESPIRATION RATE: 18 BRPM | SYSTOLIC BLOOD PRESSURE: 110 MMHG | OXYGEN SATURATION: 98 %

## 2020-01-10 DIAGNOSIS — G47.33 OSA (OBSTRUCTIVE SLEEP APNEA): Primary | ICD-10-CM

## 2020-01-10 PROCEDURE — 99212 OFFICE O/P EST SF 10 MIN: CPT | Performed by: NURSE PRACTITIONER

## 2020-01-10 NOTE — PROGRESS NOTES
Chief Complaint:   Chief Complaint   Patient presents with   • Sleep Apnea     compliance follow up       HPI:    Tatyana Willett is a 69 y.o. female here for follow-up of sleep apnea.  Patient originally was seen 3/27/2019 and wanted to start oral mandibular advancement device but returned 11/1/2019 and did decide to initiate CPAP therapy.  Patient states she is doing well with CPAP.  Patient is sleeping 7 hours nightly and does feel refreshed upon awakening.  Patient denies excessive daytime sleepiness.  Patient has an [place] score of 6/24.  Patient wishes to continue with CPAP therapy.        Current medications are:   Current Outpatient Medications:   •  albuterol (PROAIR HFA) 108 (90 Base) MCG/ACT inhaler, Inhale 1 puff Daily As Needed for Shortness of Air., Disp: 8.5 inhaler, Rfl: 5  •  aspirin 81 MG tablet, Take 1 tablet by mouth 2 (Two) Times a Day., Disp: , Rfl:   •  budesonide-formoterol (SYMBICORT) 160-4.5 MCG/ACT inhaler, Inhale 2 puffs Daily As Needed (allergies)., Disp: 10.2 g, Rfl: 5  •  Calcium Carbonate-Vitamin D (CALTRATE 600+D PO), Take 1 tablet by mouth Daily., Disp: , Rfl:   •  Cholecalciferol (VITAMIN D) 1000 UNITS tablet, Take 1 capsule by mouth Daily., Disp: , Rfl:   •  fluticasone (FLONASE) 50 MCG/ACT nasal spray, 1 spray into each nostril Every Morning., Disp: , Rfl:   •  Glucosamine-Chondroit-Vit C-Mn (GLUCOSAMINE 1500 COMPLEX PO), Take 1 tablet by mouth daily., Disp: , Rfl:   •  loratadine (CLARITIN) 10 MG tablet, Take 1 tablet by mouth daily as needed., Disp: , Rfl:   •  montelukast (SINGULAIR) 10 MG tablet, Take 1 tablet by mouth Daily As Needed (During allergy season)., Disp: 90 tablet, Rfl: 1  •  Multiple Vitamins-Minerals (WOMENS ONE DAILY) tablet, Take 1 tablet by mouth daily., Disp: , Rfl:   •  traZODone (DESYREL) 50 MG tablet, Take 1 tablet by mouth At Night As Needed for Sleep., Disp: 30 tablet, Rfl: 1  •  vitamin C (ASCORBIC ACID) 500 MG tablet, Take 1 tablet by mouth  daily., Disp: , Rfl:   •  melatonin 5 MG tablet tablet, Take 1 tablet by mouth every night., Disp: , Rfl: .      The patient's relevant past medical, surgical, family and social history were reviewed and updated in Epic as appropriate.       Review of Systems   HENT: Positive for hearing loss.    Eyes: Positive for visual disturbance.   Respiratory: Positive for apnea.    Musculoskeletal: Positive for arthralgias.   Allergic/Immunologic: Positive for environmental allergies.   Psychiatric/Behavioral: Positive for sleep disturbance.   All other systems reviewed and are negative.        Objective:    Physical Exam   Constitutional: She is oriented to person, place, and time. She appears well-developed and well-nourished.   HENT:   Head: Normocephalic and atraumatic.   Mouth/Throat: Oropharynx is clear and moist.   Mallampati 4 anatomy   Eyes: Conjunctivae are normal.   Neck: Neck supple.   Cardiovascular: Normal rate and regular rhythm.   Pulmonary/Chest: Effort normal and breath sounds normal.   Neurological: She is alert and oriented to person, place, and time.   Skin: Skin is warm and dry.   Psychiatric: She has a normal mood and affect. Her behavior is normal. Judgment and thought content normal.   Nursing note and vitals reviewed.  62/62 days of use.  Greater than 4-hour use 100%.  90% pressure 10.0.  AHI 1.7.  Download reviewed with patient.      ASSESSMENT/PLAN    Tatyana was seen today for sleep apnea.    Diagnoses and all orders for this visit:    ANNETTA (obstructive sleep apnea)            1. Counseled patient regarding multimodal approach with healthy nutrition, healthy sleep, regular physical activity, social activities, counseling, and medications. Encouraged to practice lateral sleep position. Avoid alcohol and sedatives close to bedtime.  2. Refill supplies x1 year.  Return to clinic 1 year or sooner symptoms warrant.    I have reviewed the results of my evaluation and impression and discussed my  recommendations in detail with the patient.      Signed by  Corazon Medina, APRN    January 10, 2020      CC: Stepan Ragland,           No ref. provider found

## 2020-05-28 ENCOUNTER — OFFICE VISIT (OUTPATIENT)
Dept: FAMILY MEDICINE CLINIC | Facility: CLINIC | Age: 70
End: 2020-05-28

## 2020-05-28 VITALS
OXYGEN SATURATION: 98 % | WEIGHT: 143 LBS | HEIGHT: 66 IN | DIASTOLIC BLOOD PRESSURE: 60 MMHG | HEART RATE: 60 BPM | SYSTOLIC BLOOD PRESSURE: 112 MMHG | TEMPERATURE: 97.7 F | BODY MASS INDEX: 22.98 KG/M2

## 2020-05-28 DIAGNOSIS — E55.9 VITAMIN D DEFICIENCY: ICD-10-CM

## 2020-05-28 DIAGNOSIS — Z00.00 PREVENTATIVE HEALTH CARE: Primary | ICD-10-CM

## 2020-05-28 DIAGNOSIS — J45.20 MILD INTERMITTENT ASTHMA WITHOUT COMPLICATION: ICD-10-CM

## 2020-05-28 DIAGNOSIS — J30.2 SEASONAL ALLERGIC RHINITIS, UNSPECIFIED TRIGGER: ICD-10-CM

## 2020-05-28 DIAGNOSIS — M25.562 ACUTE PAIN OF LEFT KNEE: ICD-10-CM

## 2020-05-28 DIAGNOSIS — G47.9 DYSSOMNIA: ICD-10-CM

## 2020-05-28 DIAGNOSIS — E78.5 DYSLIPIDEMIA: ICD-10-CM

## 2020-05-28 DIAGNOSIS — Z82.49 FAMILY HISTORY OF HEART DISEASE: ICD-10-CM

## 2020-05-28 PROCEDURE — 90471 IMMUNIZATION ADMIN: CPT | Performed by: FAMILY MEDICINE

## 2020-05-28 PROCEDURE — 99397 PER PM REEVAL EST PAT 65+ YR: CPT | Performed by: FAMILY MEDICINE

## 2020-05-28 PROCEDURE — 90732 PPSV23 VACC 2 YRS+ SUBQ/IM: CPT | Performed by: FAMILY MEDICINE

## 2020-05-29 ENCOUNTER — LAB (OUTPATIENT)
Dept: LAB | Facility: HOSPITAL | Age: 70
End: 2020-05-29

## 2020-05-29 DIAGNOSIS — J45.20 MILD INTERMITTENT ASTHMA WITHOUT COMPLICATION: ICD-10-CM

## 2020-05-29 DIAGNOSIS — Z82.49 FAMILY HISTORY OF HEART DISEASE: ICD-10-CM

## 2020-05-29 DIAGNOSIS — Z00.00 PREVENTATIVE HEALTH CARE: ICD-10-CM

## 2020-05-29 DIAGNOSIS — G47.9 DYSSOMNIA: ICD-10-CM

## 2020-05-29 DIAGNOSIS — J30.2 SEASONAL ALLERGIC RHINITIS, UNSPECIFIED TRIGGER: ICD-10-CM

## 2020-05-29 DIAGNOSIS — E55.9 VITAMIN D DEFICIENCY: ICD-10-CM

## 2020-05-29 DIAGNOSIS — E78.5 DYSLIPIDEMIA: ICD-10-CM

## 2020-05-29 LAB
25(OH)D3 SERPL-MCNC: 49.7 NG/ML (ref 30–100)
ALBUMIN SERPL-MCNC: 4.2 G/DL (ref 3.5–5.2)
ALBUMIN/GLOB SERPL: 1.8 G/DL
ALP SERPL-CCNC: 52 U/L (ref 39–117)
ALT SERPL W P-5'-P-CCNC: 25 U/L (ref 1–33)
ANION GAP SERPL CALCULATED.3IONS-SCNC: 8.6 MMOL/L (ref 5–15)
AST SERPL-CCNC: 27 U/L (ref 1–32)
BASOPHILS # BLD AUTO: 0.04 10*3/MM3 (ref 0–0.2)
BASOPHILS NFR BLD AUTO: 0.9 % (ref 0–1.5)
BILIRUB SERPL-MCNC: 0.4 MG/DL (ref 0.2–1.2)
BILIRUB UR QL STRIP: NEGATIVE
BUN BLD-MCNC: 14 MG/DL (ref 8–23)
BUN/CREAT SERPL: 16.9 (ref 7–25)
CALCIUM SPEC-SCNC: 9.2 MG/DL (ref 8.6–10.5)
CHLORIDE SERPL-SCNC: 104 MMOL/L (ref 98–107)
CHOLEST SERPL-MCNC: 161 MG/DL (ref 0–200)
CLARITY UR: CLEAR
CO2 SERPL-SCNC: 27.4 MMOL/L (ref 22–29)
COLOR UR: YELLOW
CREAT BLD-MCNC: 0.83 MG/DL (ref 0.57–1)
CRP SERPL-MCNC: 0.12 MG/DL (ref 0.01–0.5)
DEPRECATED RDW RBC AUTO: 42.2 FL (ref 37–54)
EOSINOPHIL # BLD AUTO: 0.21 10*3/MM3 (ref 0–0.4)
EOSINOPHIL NFR BLD AUTO: 5 % (ref 0.3–6.2)
ERYTHROCYTE [DISTWIDTH] IN BLOOD BY AUTOMATED COUNT: 12.7 % (ref 12.3–15.4)
GFR SERPL CREATININE-BSD FRML MDRD: 68 ML/MIN/1.73
GLOBULIN UR ELPH-MCNC: 2.4 GM/DL
GLUCOSE BLD-MCNC: 98 MG/DL (ref 65–99)
GLUCOSE UR STRIP-MCNC: NEGATIVE MG/DL
HBA1C MFR BLD: 5.22 % (ref 4.8–5.6)
HCT VFR BLD AUTO: 42.3 % (ref 34–46.6)
HDLC SERPL-MCNC: 68 MG/DL (ref 40–60)
HGB BLD-MCNC: 14 G/DL (ref 12–15.9)
HGB UR QL STRIP.AUTO: NEGATIVE
IMM GRANULOCYTES # BLD AUTO: 0.01 10*3/MM3 (ref 0–0.05)
IMM GRANULOCYTES NFR BLD AUTO: 0.2 % (ref 0–0.5)
KETONES UR QL STRIP: NEGATIVE
LDLC SERPL CALC-MCNC: 85 MG/DL (ref 0–100)
LDLC/HDLC SERPL: 1.25 {RATIO}
LEUKOCYTE ESTERASE UR QL STRIP.AUTO: NEGATIVE
LYMPHOCYTES # BLD AUTO: 1.26 10*3/MM3 (ref 0.7–3.1)
LYMPHOCYTES NFR BLD AUTO: 29.9 % (ref 19.6–45.3)
MCH RBC QN AUTO: 29.7 PG (ref 26.6–33)
MCHC RBC AUTO-ENTMCNC: 33.1 G/DL (ref 31.5–35.7)
MCV RBC AUTO: 89.6 FL (ref 79–97)
MONOCYTES # BLD AUTO: 0.32 10*3/MM3 (ref 0.1–0.9)
MONOCYTES NFR BLD AUTO: 7.6 % (ref 5–12)
NEUTROPHILS # BLD AUTO: 2.38 10*3/MM3 (ref 1.7–7)
NEUTROPHILS NFR BLD AUTO: 56.4 % (ref 42.7–76)
NITRITE UR QL STRIP: NEGATIVE
NRBC BLD AUTO-RTO: 0 /100 WBC (ref 0–0.2)
PH UR STRIP.AUTO: 6.5 [PH] (ref 5–8)
PLATELET # BLD AUTO: 183 10*3/MM3 (ref 140–450)
PMV BLD AUTO: 10.3 FL (ref 6–12)
POTASSIUM BLD-SCNC: 4.2 MMOL/L (ref 3.5–5.2)
PROT SERPL-MCNC: 6.6 G/DL (ref 6–8.5)
PROT UR QL STRIP: NEGATIVE
RBC # BLD AUTO: 4.72 10*6/MM3 (ref 3.77–5.28)
SODIUM BLD-SCNC: 140 MMOL/L (ref 136–145)
SP GR UR STRIP: 1.02 (ref 1–1.03)
TRIGL SERPL-MCNC: 40 MG/DL (ref 0–150)
TSH SERPL DL<=0.05 MIU/L-ACNC: 3.06 UIU/ML (ref 0.27–4.2)
UROBILINOGEN UR QL STRIP: NORMAL
VIT B12 BLD-MCNC: 852 PG/ML (ref 211–946)
VLDLC SERPL-MCNC: 8 MG/DL (ref 5–40)
WBC NRBC COR # BLD: 4.22 10*3/MM3 (ref 3.4–10.8)

## 2020-05-29 PROCEDURE — 84443 ASSAY THYROID STIM HORMONE: CPT

## 2020-05-29 PROCEDURE — 80061 LIPID PANEL: CPT

## 2020-05-29 PROCEDURE — 81003 URINALYSIS AUTO W/O SCOPE: CPT

## 2020-05-29 PROCEDURE — 82306 VITAMIN D 25 HYDROXY: CPT

## 2020-05-29 PROCEDURE — 82784 ASSAY IGA/IGD/IGG/IGM EACH: CPT

## 2020-05-29 PROCEDURE — 82787 IGG 1 2 3 OR 4 EACH: CPT

## 2020-05-29 PROCEDURE — 86141 C-REACTIVE PROTEIN HS: CPT

## 2020-05-29 PROCEDURE — 82607 VITAMIN B-12: CPT

## 2020-05-29 PROCEDURE — 83036 HEMOGLOBIN GLYCOSYLATED A1C: CPT

## 2020-05-29 PROCEDURE — 80053 COMPREHEN METABOLIC PANEL: CPT

## 2020-05-29 PROCEDURE — 85025 COMPLETE CBC W/AUTO DIFF WBC: CPT

## 2020-05-30 LAB
IGG SERPL-MCNC: 958 MG/DL (ref 586–1602)
IGG1 SER-MCNC: 594 MG/DL (ref 248–810)
IGG2 SER-MCNC: 119 MG/DL (ref 130–555)
IGG3 SER-MCNC: 15 MG/DL (ref 15–102)
IGG4 SER-MCNC: 29 MG/DL (ref 2–96)

## 2020-06-22 DIAGNOSIS — J30.2 SEASONAL ALLERGIC RHINITIS, UNSPECIFIED TRIGGER: ICD-10-CM

## 2020-06-22 DIAGNOSIS — J45.20 MILD INTERMITTENT ASTHMA WITHOUT COMPLICATION: ICD-10-CM

## 2020-06-23 RX ORDER — MONTELUKAST SODIUM 10 MG/1
10 TABLET ORAL DAILY PRN
Qty: 90 TABLET | Refills: 1 | Status: SHIPPED | OUTPATIENT
Start: 2020-06-23 | End: 2022-03-21

## 2020-07-31 PROCEDURE — U0003 INFECTIOUS AGENT DETECTION BY NUCLEIC ACID (DNA OR RNA); SEVERE ACUTE RESPIRATORY SYNDROME CORONAVIRUS 2 (SARS-COV-2) (CORONAVIRUS DISEASE [COVID-19]), AMPLIFIED PROBE TECHNIQUE, MAKING USE OF HIGH THROUGHPUT TECHNOLOGIES AS DESCRIBED BY CMS-2020-01-R: HCPCS | Performed by: NURSE PRACTITIONER

## 2020-08-03 ENCOUNTER — TELEPHONE (OUTPATIENT)
Dept: URGENT CARE | Facility: CLINIC | Age: 70
End: 2020-08-03

## 2020-08-03 NOTE — TELEPHONE ENCOUNTER
Pt notified COVID Negative.   ----- Message from Eric Whaley MD sent at 8/3/2020  8:22 AM EDT -----  COVID is not detected.  Please notify the patient.-Eric Whaley M.D.      ----- Message -----  From: Lab, Background User  Sent: 8/2/2020   6:07 PM EDT  To: [facility] Covid Results

## 2020-08-05 ENCOUNTER — TELEPHONE (OUTPATIENT)
Dept: URGENT CARE | Facility: CLINIC | Age: 70
End: 2020-08-05

## 2020-08-11 ENCOUNTER — OFFICE VISIT (OUTPATIENT)
Dept: ORTHOPEDIC SURGERY | Facility: CLINIC | Age: 70
End: 2020-08-11

## 2020-08-11 VITALS — WEIGHT: 138 LBS | OXYGEN SATURATION: 99 % | HEART RATE: 85 BPM | BODY MASS INDEX: 22.18 KG/M2 | HEIGHT: 66 IN

## 2020-08-11 DIAGNOSIS — M25.551 RIGHT HIP PAIN: ICD-10-CM

## 2020-08-11 DIAGNOSIS — M70.61 TROCHANTERIC BURSITIS OF RIGHT HIP: Primary | ICD-10-CM

## 2020-08-11 PROCEDURE — 20610 DRAIN/INJ JOINT/BURSA W/O US: CPT | Performed by: ORTHOPAEDIC SURGERY

## 2020-08-11 PROCEDURE — 99204 OFFICE O/P NEW MOD 45 MIN: CPT | Performed by: ORTHOPAEDIC SURGERY

## 2020-08-11 RX ORDER — LIDOCAINE HYDROCHLORIDE 10 MG/ML
3 INJECTION, SOLUTION EPIDURAL; INFILTRATION; INTRACAUDAL; PERINEURAL
Status: COMPLETED | OUTPATIENT
Start: 2020-08-11 | End: 2020-08-11

## 2020-08-11 RX ORDER — BUPIVACAINE HYDROCHLORIDE 2.5 MG/ML
3 INJECTION, SOLUTION EPIDURAL; INFILTRATION; INTRACAUDAL
Status: COMPLETED | OUTPATIENT
Start: 2020-08-11 | End: 2020-08-11

## 2020-08-11 RX ORDER — TRIAMCINOLONE ACETONIDE 40 MG/ML
80 INJECTION, SUSPENSION INTRA-ARTICULAR; INTRAMUSCULAR
Status: COMPLETED | OUTPATIENT
Start: 2020-08-11 | End: 2020-08-11

## 2020-08-11 RX ADMIN — BUPIVACAINE HYDROCHLORIDE 3 ML: 2.5 INJECTION, SOLUTION EPIDURAL; INFILTRATION; INTRACAUDAL at 08:37

## 2020-08-11 RX ADMIN — TRIAMCINOLONE ACETONIDE 80 MG: 40 INJECTION, SUSPENSION INTRA-ARTICULAR; INTRAMUSCULAR at 08:37

## 2020-08-11 RX ADMIN — LIDOCAINE HYDROCHLORIDE 3 ML: 10 INJECTION, SOLUTION EPIDURAL; INFILTRATION; INTRACAUDAL; PERINEURAL at 08:37

## 2020-08-11 NOTE — PROGRESS NOTES
Procedure   Large Joint Arthrocentesis: R greater trochanteric bursa  Date/Time: 8/11/2020 8:37 AM  Consent given by: patient  Site marked: site marked  Timeout: Immediately prior to procedure a time out was called to verify the correct patient, procedure, equipment, support staff and site/side marked as required   Supporting Documentation  Indications: pain   Procedure Details  Location: hip - R greater trochanteric bursa  Preparation: Patient was prepped and draped in the usual sterile fashion  Needle size: 22 G  Approach: lateral  Medications administered: 3 mL bupivacaine (PF) 0.25 %; 3 mL lidocaine PF 1% 1 %; 80 mg triamcinolone acetonide 40 MG/ML  Patient tolerance: patient tolerated the procedure well with no immediate complications

## 2020-08-11 NOTE — PROGRESS NOTES
Orthopaedic Clinic Note: Hip New Patient    Chief Complaint   Patient presents with   • Right Hip - Pain        HPI    Tatyana Willett is a 70 y.o. female who presents with right hip pain for 2 year(s). Onset atraumatic and gradual in nature. Pain is localized to lateral trochanter and is radiating down leg and is a 4/10 on the pain scale.Pain is described as aching. Associated symptoms include weakness with stairs.  The pain is worse with sitting, climbing stairs and sleeping; pain medication and/or NSAID improve the pain. Previous treatments have included: Tylenol since symptom onset. Although some transient relief was reported with these interventions, these conservative measures have failed and symptoms have persisted. The patient is limited in daily activities and has had a significant decrease in quality of life as a result. She denies fevers, chills, or constitutional symptoms.    I have reviewed the following portions of the patient's history:History of Present Illness    Past Medical History:   Diagnosis Date   • Allergic rhinitis Lifelong    Moderately severe with nasal polyps   • Asthma 2011    Severe flare with bronchoscopy from pine needle exposure   • CTS (carpal tunnel syndrome) 2004    Bilateral   • Eosinophilic esophagitis 2018    Biopsy-positive   • GERD (gastroesophageal reflux disease) 2016   • Lumbar scoliosis 2013    Recurrent discomfort   • Lumbar spinal stenosis 2013   • Migraine 2004    hospitalized   • Mononucleosis 1962   • Osteoarthritis of hands, bilateral    • Post menopausal syndrome 2000    Hot flashes with impaired sleep   • Sleep disorder    • Varicose vein of leg 1990    venous ligation left leg      Past Surgical History:   Procedure Laterality Date   • BRONCHOSCOPY  2011    Severe allergic pneumonitis   • COLONOSCOPY  2013    Benign study   • LARYNGOSCOPY  2017    Indirect exam   • TONSILLECTOMY  1961   • TONSILLECTOMY     • VEIN SURGERY Left 1990    Ligation of left leg       Family History   Problem Relation Age of Onset   • Dementia Mother    • Hearing loss Mother    • Osteoarthritis Mother    • Esophageal cancer Father           age 60   • Achalasia Brother    • Osteoarthritis Brother    • Pancreatic cancer Brother    • Pancreatic cancer Maternal Grandmother    • Heart failure Paternal Grandmother    • Heart attack Paternal Grandfather          age 65   • Osteoarthritis Brother         Bilateral hip replacements   • Breast cancer Neg Hx    • Ovarian cancer Neg Hx      Social History     Socioeconomic History   • Marital status:      Spouse name: Not on file   • Number of children: Not on file   • Years of education: Not on file   • Highest education level: Not on file   Tobacco Use   • Smoking status: Never Smoker   • Smokeless tobacco: Never Used   Substance and Sexual Activity   • Alcohol use: No   • Drug use: No   • Sexual activity: Defer   Social History Narrative    Domestic life   lives in private home with         Orthodoxy   [employer]        Sleep hygiene   some sleep impairment from back pain        Caffeine use   1 cups half-and-half coffee daily        Exercise habits    walks daily.  Back exercises regularly.  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 8.5 inhaler 5   • aspirin 81 MG tablet Take 1 tablet by mouth 2 (Two) Times a Day.     • budesonide-formoterol (SYMBICORT) 160-4.5 MCG/ACT inhaler Inhale 2 puffs Daily As Needed (allergies). 10.2 g 5   • Calcium Carbonate-Vitamin D (CALTRATE 600+D PO) Take  "1 tablet by mouth Daily.     • Cholecalciferol (VITAMIN D) 1000 UNITS tablet Take 1 capsule by mouth Daily.     • fluticasone (FLONASE) 50 MCG/ACT nasal spray 1 spray into each nostril Every Morning.     • Glucosamine-Chondroit-Vit C-Mn (GLUCOSAMINE 1500 COMPLEX PO) Take 1 tablet by mouth daily.     • loratadine (CLARITIN) 10 MG tablet Take 1 tablet by mouth daily as needed.     • montelukast (SINGULAIR) 10 MG tablet Take 1 tablet by mouth Daily As Needed (During allergy season). 90 tablet 1   • Multiple Vitamins-Minerals (WOMENS ONE DAILY) tablet Take 1 tablet by mouth daily.     • vitamin C (ASCORBIC ACID) 500 MG tablet Take 1 tablet by mouth daily.       No current facility-administered medications on file prior to visit.       Allergies   Allergen Reactions   • Shellfish Allergy Hives     oysters   • Poison Ivy Extract [Poison Ivy Extract] Rash        Review of Systems   Constitutional: Negative.    HENT: Positive for hearing loss.    Eyes: Negative.    Respiratory: Negative.    Cardiovascular: Negative.    Gastrointestinal: Negative.    Endocrine: Negative.    Genitourinary: Negative.    Musculoskeletal: Positive for arthralgias and back pain.   Skin: Negative.    Allergic/Immunologic: Positive for environmental allergies.   Neurological: Negative.    Hematological: Negative.    Psychiatric/Behavioral: Negative.         The patient's Review of Systems was personally reviewed and confirmed as accurate.    The following portions of the patient's history were reviewed and updated as appropriate: allergies, current medications, past family history, past medical history, past social history, past surgical history and problem list.    Physical Exam  Pulse 85, height 166.4 cm (65.51\"), weight 62.6 kg (138 lb), SpO2 99 %, not currently breastfeeding.    Body mass index is 22.61 kg/m².    GENERAL APPEARANCE: awake, alert & oriented x 3, in no acute distress and well developed, well nourished  PSYCH: normal " affect  LUNGS:  breathing nonlabored  EYES: sclera anicteric  CARDIOVASCULAR: palpable dorsalis pedis, palpable posterior tibial bilaterally. Capillary refill less than 2 seconds  EXTREMITIES: no clubbing, cyanosis  GAIT:  Normal           Right Hip Exam:  RANGE OF MOTION:   FLEXION CONTRACTURE: None  FLEXION: 110 degrees  INTERNAL ROTATION: 20 degrees at 90 degrees of flexion   EXTERNAL ROTATION: 40 degrees at 90 degrees of flexion    PAIN WITH HIP MOTION: no  PAIN WITH LOGROLL: no  STINCHFIELD TEST: negative    KNEE EXAM: full knee ROM (0-120 degrees), stable to varus and valgus stress at terminal extension and 30 degrees flexion     STRENGTH:  5/5 hip adduction, abduction, flexion. 5/5 knee flexion, extension. 5/5 ankle dorsiflexion and plantarflexion.     GREATER TROCHANTER BURSAL PAIN:  yes     REFLEXES:   PATELLAR 2+/4   ACHILLES 2+/4    CLONUS: no  STRAIGHT LEG TEST:   negative    SENSATION TO LIGHT TOUCH:  DEEP PERONEAL/SUPERFICIAL PERONEAL/SURAL/SAPHENOUS/TIBIAL:  intact    EDEMA:   no  ERYTHEMA:  no  WOUNDS/INCISIONS:  no      Left Hip Exam:   RANGE OF MOTION:   FLEXION CONTRACTURE: None   FLEXION: 110 degrees   INTERNAL ROTATION: 20 degrees at 90 degrees of flexion   EXTERNAL ROTATION: 40 degrees at 90 degrees of flexion    PAIN WITH HIP MOTION: no  PAIN WITH LOGROLL: no  STINCHFIELD TEST: negative    KNEE EXAM: full knee ROM (0-120 degrees), stable to varus and valgus stress at terminal extension and 30 degrees flexion     STRENGTH:  5/5 hip adduction, abduction, flexion. 5/5 strength knee flexion, extension. 5/5 strength ankle dorsiflexion and plantarflexion.     GREATER TROCHANTER BURSAL PAIN:  no     REFLEXES:   PATELLAR 2+/4   ACHILLES 2+/4    CLONUS: negative  STRAIGHT LEG TEST:   negative    SENSATION TO LIGHT TOUCH:  DEEP PERONEAL/SUPERFICIAL PERONEAL/SURAL/SAPHENOUS/TIBIAL:  intact    EDEMA:   no  ERYTHEMA:  no  WOUNDS/INCISIONS: no overlying skin  problems.      ------------------------------------------------------------------    LEG LENGTHS:  equal  _____________________________________________________  _____________________________________________________    RADIOGRAPHIC FINDINGS:   Indication: Right hip pain    Comparison: Todays x-rays are compared to prior x-rays from 3/22/2019    AP pelvis, hip 2 views: Right: mild joint space narrowing, minimal osteophyte formation and No significant changes compared to prior radiographs.; Left: mild joint space narrowing, minimal osteophyte formation and No significant changes compared to prior radiographs.    Assessment/Plan:   Diagnosis Plan   1. Trochanteric bursitis of right hip     2. Right hip pain  XR Hip With or Without Pelvis 1 View Right    Large Joint Arthrocentesis: R greater trochanteric bursa     Patient's hip pain is due to trochanteric bursitis.  I discussed treatment options.  She is agreeable to right hip trochanteric bursa injection today.  She will follow-up as needed.    Procedure Note:  I discussed with the patient the potential benefits of performing a therapeutic injection of the right hip trochanteric bursa as well as potential risks including but not limited to infection, swelling, pain, bleeding, bruising, nerve/vessel damage, skin color changes, transient elevation in blood glucose levels, and fat atrophy. After informed consent and after the area was prepped with alcohol, ethyl chloride was used to numb the skin. Via the direct lateral approach, 3cc of 1% lidocaine, 3cc of 0.25% bupivicaine and 2 cc of 40mg/ml of Kenalog were injected into the right hip trochanteric bursa. The patient tolerated the procedure well. There were no complications. A sterile dressing was placed over the injection site.      Silvio Isaacs MD  08/11/20  08:41

## 2020-09-28 ENCOUNTER — TRANSCRIBE ORDERS (OUTPATIENT)
Dept: FAMILY MEDICINE CLINIC | Facility: CLINIC | Age: 70
End: 2020-09-28

## 2020-09-28 DIAGNOSIS — Z12.31 VISIT FOR SCREENING MAMMOGRAM: Primary | ICD-10-CM

## 2020-12-17 ENCOUNTER — IMMUNIZATION (OUTPATIENT)
Dept: VACCINE CLINIC | Facility: HOSPITAL | Age: 70
End: 2020-12-17

## 2020-12-17 PROCEDURE — 91300 HC SARSCOV02 VAC 30MCG/0.3ML IM: CPT

## 2020-12-17 PROCEDURE — 0001A: CPT

## 2021-01-07 ENCOUNTER — IMMUNIZATION (OUTPATIENT)
Dept: VACCINE CLINIC | Facility: HOSPITAL | Age: 71
End: 2021-01-07

## 2021-01-07 PROCEDURE — 0002A: CPT | Performed by: INTERNAL MEDICINE

## 2021-01-07 PROCEDURE — 91300 HC SARSCOV02 VAC 30MCG/0.3ML IM: CPT | Performed by: INTERNAL MEDICINE

## 2021-01-07 PROCEDURE — 0001A: CPT | Performed by: INTERNAL MEDICINE

## 2021-01-11 ENCOUNTER — TELEMEDICINE (OUTPATIENT)
Dept: SLEEP MEDICINE | Facility: HOSPITAL | Age: 71
End: 2021-01-11

## 2021-01-11 ENCOUNTER — HOSPITAL ENCOUNTER (OUTPATIENT)
Dept: MAMMOGRAPHY | Facility: HOSPITAL | Age: 71
Discharge: HOME | End: 2021-01-11
Admitting: FAMILY MEDICINE

## 2021-01-11 VITALS — HEIGHT: 66 IN | BODY MASS INDEX: 22.5 KG/M2 | WEIGHT: 140 LBS

## 2021-01-11 DIAGNOSIS — Z12.31 VISIT FOR SCREENING MAMMOGRAM: ICD-10-CM

## 2021-01-11 DIAGNOSIS — G47.33 OSA (OBSTRUCTIVE SLEEP APNEA): Primary | ICD-10-CM

## 2021-01-11 PROCEDURE — 77063 BREAST TOMOSYNTHESIS BI: CPT

## 2021-01-11 PROCEDURE — 77067 SCR MAMMO BI INCL CAD: CPT

## 2021-01-11 PROCEDURE — 77067 SCR MAMMO BI INCL CAD: CPT | Performed by: RADIOLOGY

## 2021-01-11 PROCEDURE — 77063 BREAST TOMOSYNTHESIS BI: CPT | Performed by: RADIOLOGY

## 2021-01-11 PROCEDURE — 99212 OFFICE O/P EST SF 10 MIN: CPT | Performed by: NURSE PRACTITIONER

## 2021-01-11 NOTE — PROGRESS NOTES
Chief Complaint:   Chief Complaint   Patient presents with   • Follow-up       HPI:    Tatyana Willett is a 70 y.o. female here for follow-up of sleep apnea.  Patient was last seen 1/10/2020.  Patient states she continues to do well with CPAP therapy.  Patient is sleeping 7-1/2 hours nightly and feels refreshed upon awakening.  Patient will go to sleep and under 5 minutes.  Patient states she does awaken several times during the night but this is due to joint pain and aches.  Patient has an [place] score of 6/24.  Patient has no concerns or complaints regarding CPAP therapy and wishes to continue.        Current medications are:   Current Outpatient Medications:   •  albuterol (PROAIR HFA) 108 (90 Base) MCG/ACT inhaler, Inhale 1 puff Daily As Needed for Shortness of Air., Disp: 8.5 inhaler, Rfl: 5  •  budesonide-formoterol (SYMBICORT) 160-4.5 MCG/ACT inhaler, Inhale 2 puffs Daily As Needed (allergies)., Disp: 10.2 g, Rfl: 5  •  Calcium Carbonate-Vitamin D (CALTRATE 600+D PO), Take 1 tablet by mouth Daily., Disp: , Rfl:   •  Cholecalciferol (VITAMIN D) 1000 UNITS tablet, Take 1 capsule by mouth Daily., Disp: , Rfl:   •  fluticasone (FLONASE) 50 MCG/ACT nasal spray, 1 spray into each nostril Every Morning., Disp: , Rfl:   •  Glucosamine-Chondroit-Vit C-Mn (GLUCOSAMINE 1500 COMPLEX PO), Take 1 tablet by mouth daily., Disp: , Rfl:   •  loratadine (CLARITIN) 10 MG tablet, Take 1 tablet by mouth daily as needed., Disp: , Rfl:   •  montelukast (SINGULAIR) 10 MG tablet, Take 1 tablet by mouth Daily As Needed (During allergy season)., Disp: 90 tablet, Rfl: 1  •  Multiple Vitamins-Minerals (WOMENS ONE DAILY) tablet, Take 1 tablet by mouth daily., Disp: , Rfl:   •  vitamin C (ASCORBIC ACID) 500 MG tablet, Take 1 tablet by mouth daily., Disp: , Rfl: .      The patient's relevant past medical, surgical, family and social history were reviewed and updated in Epic as appropriate.       Review of Systems   HENT: Positive for  hearing loss.    Eyes: Positive for visual disturbance.   Respiratory: Positive for apnea and wheezing.    Musculoskeletal: Positive for arthralgias.   Allergic/Immunologic: Positive for environmental allergies.   Psychiatric/Behavioral: Positive for sleep disturbance.   All other systems reviewed and are negative.        Objective:    Physical Exam  Constitutional:       Appearance: Normal appearance. She is normal weight.   HENT:      Head: Normocephalic and atraumatic.      Mouth/Throat:      Comments: Class 4 airway  Pulmonary:      Effort: Pulmonary effort is normal. No respiratory distress.   Skin:     General: Skin is dry.      Coloration: Skin is not pale.      Findings: No erythema.   Neurological:      Mental Status: She is alert and oriented to person, place, and time.   Psychiatric:         Mood and Affect: Mood normal.         Behavior: Behavior normal.         Thought Content: Thought content normal.         Judgment: Judgment normal.       28/30 days of use  Greater than 4-hour use 90%  90% pressure 10.4  AHI 1.6  Settings 818    ASSESSMENT/PLAN    Diagnoses and all orders for this visit:    1. ANNETTA (obstructive sleep apnea) (Primary)  -     CPAP Therapy            1. Counseled patient regarding multimodal approach with healthy nutrition, healthy sleep, regular physical activity, social activities, counseling, and medications. Encouraged to practice lateral sleep position. Avoid alcohol and sedatives close to bedtime.  2.   Refill supplies x1 year.  Return to clinic 1 year or sooner symptoms warrant.  Patient gave verbal consent for video visit.  I have reviewed the results of my evaluation and impression and discussed my recommendations in detail with the patient.      Signed by  NAI Torres    January 11, 2021      CC: Stepan Ragland DO          No ref. provider found

## 2021-01-26 ENCOUNTER — HOSPITAL ENCOUNTER (OUTPATIENT)
Dept: ULTRASOUND IMAGING | Facility: HOSPITAL | Age: 71
Discharge: HOME | End: 2021-01-26

## 2021-01-26 ENCOUNTER — HOSPITAL ENCOUNTER (OUTPATIENT)
Dept: MAMMOGRAPHY | Facility: HOSPITAL | Age: 71
Discharge: HOME | End: 2021-01-26

## 2021-01-26 DIAGNOSIS — R92.8 ABNORMAL MAMMOGRAM: ICD-10-CM

## 2021-01-26 PROCEDURE — G0279 TOMOSYNTHESIS, MAMMO: HCPCS | Performed by: RADIOLOGY

## 2021-01-26 PROCEDURE — 77065 DX MAMMO INCL CAD UNI: CPT

## 2021-01-26 PROCEDURE — 76642 ULTRASOUND BREAST LIMITED: CPT | Performed by: RADIOLOGY

## 2021-01-26 PROCEDURE — 77065 DX MAMMO INCL CAD UNI: CPT | Performed by: RADIOLOGY

## 2021-01-26 PROCEDURE — G0279 TOMOSYNTHESIS, MAMMO: HCPCS

## 2021-01-26 PROCEDURE — 76642 ULTRASOUND BREAST LIMITED: CPT

## 2021-05-26 ENCOUNTER — OFFICE VISIT (OUTPATIENT)
Dept: FAMILY MEDICINE CLINIC | Facility: CLINIC | Age: 71
End: 2021-05-26

## 2021-05-26 ENCOUNTER — HOSPITAL ENCOUNTER (OUTPATIENT)
Dept: GENERAL RADIOLOGY | Facility: HOSPITAL | Age: 71
Discharge: HOME | End: 2021-05-26
Admitting: FAMILY MEDICINE

## 2021-05-26 VITALS
BODY MASS INDEX: 23.86 KG/M2 | SYSTOLIC BLOOD PRESSURE: 112 MMHG | HEIGHT: 65 IN | WEIGHT: 143.2 LBS | OXYGEN SATURATION: 99 % | HEART RATE: 66 BPM | DIASTOLIC BLOOD PRESSURE: 64 MMHG

## 2021-05-26 DIAGNOSIS — M70.52 SUPRAPATELLAR BURSITIS OF LEFT KNEE: ICD-10-CM

## 2021-05-26 DIAGNOSIS — M25.562 ACUTE PAIN OF LEFT KNEE: Primary | ICD-10-CM

## 2021-05-26 DIAGNOSIS — M25.562 ACUTE PAIN OF LEFT KNEE: ICD-10-CM

## 2021-05-26 PROCEDURE — 73562 X-RAY EXAM OF KNEE 3: CPT

## 2021-05-26 PROCEDURE — 99214 OFFICE O/P EST MOD 30 MIN: CPT | Performed by: FAMILY MEDICINE

## 2021-05-26 RX ORDER — SULFAMETHOXAZOLE AND TRIMETHOPRIM 800; 160 MG/1; MG/1
1 TABLET ORAL 2 TIMES DAILY
Qty: 28 TABLET | Refills: 0 | Status: SHIPPED | OUTPATIENT
Start: 2021-05-26 | End: 2021-06-03

## 2021-05-26 NOTE — PROGRESS NOTES
Subjective   Tatyana Willett is a 70 y.o. female.     Chief Complaint   Patient presents with   • Knee Pain     . Pt states that she fell one week ago at at gas station over a curb and fell andhit her left knee        History of Present Illness     Tatyana Willett presents today for   Chief Complaint   Patient presents with   • Knee Pain     . Pt states that she fell one week ago at at gas station over a curb and fell andhit her left knee       Tatyana presents today for same day visit regarding left knee pain. On 05/18/2021, she fell at a gas station and hit her knee. The patient is able to ambulate, and bend her knee without difficulty. Once she arrived home, she noticed her knee was swollen. She iced her knee for the rest of the afternoon and evening, her knee felt better the following day, however, it does not seem to be improving. He denies any pain or tenderness below the patella, locking, or catching. She localizes pain to the area of edema. She reports her knee feels warm to the touch.    This patient is accompanied by their self who contributes to the history of their care.    The following portions of the patient's history were reviewed and updated as appropriate: allergies, current medications, past family history, past medical history, past social history, past surgical history and problem list.    Active Ambulatory Problems     Diagnosis Date Noted   • Atopic rhinitis 05/13/2016   • Asthma 05/13/2016   • Ingrown toenail 05/13/2016   • Osteoarthritis of lumbar spine 05/13/2016   • Primary localized osteoarthrosis of hand 05/13/2016   • Scoliosis 05/13/2016   • Dyssomnia 05/13/2016   • Preventative health care 09/07/2016   • Low back pain 09/07/2016   • Throat pain 01/26/2018   • GERD (gastroesophageal reflux disease) 01/26/2018   • Family history of heart disease 01/26/2018   • Eosinophilic esophagitis 02/16/2018   • Acute arytenoiditis 02/16/2018   • Parasomnia in conditions classified  elsewhere 2019   • ANNETTA (obstructive sleep apnea) 2019     Resolved Ambulatory Problems     Diagnosis Date Noted   • Cellulitis 2016   • Ethmoid sinusitis 2016   • Acute sinusitis 2017   • Acute non-recurrent maxillary sinusitis 10/19/2017   • Cervical lymphadenitis 10/19/2017     Past Medical History:   Diagnosis Date   • Allergic rhinitis Lifelong   • CTS (carpal tunnel syndrome)    • Lumbar scoliosis    • Lumbar spinal stenosis    • Migraine    • Mononucleosis    • Osteoarthritis of hands, bilateral    • Post menopausal syndrome    • Sleep disorder    • Varicose vein of leg      Past Surgical History:   Procedure Laterality Date   • BRONCHOSCOPY      Severe allergic pneumonitis   • COLONOSCOPY      Benign study   • LARYNGOSCOPY      Indirect exam   • TONSILLECTOMY     • TONSILLECTOMY     • VEIN SURGERY Left     Ligation of left leg     Family History   Problem Relation Age of Onset   • Dementia Mother    • Hearing loss Mother    • Osteoarthritis Mother    • Esophageal cancer Father           age 60   • Achalasia Brother    • Osteoarthritis Brother    • Pancreatic cancer Brother    • Pancreatic cancer Maternal Grandmother    • Heart failure Paternal Grandmother    • Heart attack Paternal Grandfather          age 65   • Osteoarthritis Brother         Bilateral hip replacements   • Breast cancer Neg Hx    • Ovarian cancer Neg Hx      Social History     Socioeconomic History   • Marital status:      Spouse name: Not on file   • Number of children: Not on file   • Years of education: Not on file   • Highest education level: Not on file   Tobacco Use   • Smoking status: Never Smoker   • Smokeless tobacco: Never Used   Substance and Sexual Activity   • Alcohol use: No   • Drug use: No   • Sexual activity: Defer       Review of Systems  Review of Systems -  General ROS: negative for - chills, fever or night sweats  Genito-Urinary  "ROS: no dysuria, trouble voiding, or hematuria    Objective   Blood pressure 112/64, pulse 66, height 165.1 cm (65\"), weight 65 kg (143 lb 3.2 oz), SpO2 99 %, not currently breastfeeding.  Nursing note reviewed  Physical Exam  Const: NAD, A&Ox4, Pleasant, Cooperative  Eyes: EOMI, no conjunctivitis  ENT: No nasal discharge present, neck supple  Cardiac: Regular rate and rhythm, no cyanosis  Resp: Respiratory rate within normal limits, no increased work of breathing, no audible wheezing or retractions noted  GI: No distention or ascites  MSK: There is some mild suprapatellar bursal swelling. TTP at superior pole of patella. Small abrasion over patella, mild warmth but no redness overlying  Procedures   XR Knee 3 View Left    Result Date: 5/26/2021  No acute osseous finding of the left knee with mild-moderate tricompartmental DJD.  D:  05/26/2021 E:  05/26/2021        Assessment/Plan   Problem List Items Addressed This Visit     None      Visit Diagnoses     Acute pain of left knee    -  Primary    Relevant Medications    sulfamethoxazole-trimethoprim (Bactrim DS) 800-160 MG per tablet    Other Relevant Orders    XR Knee 3 View Left (Completed)    Suprapatellar bursitis of left knee        Relevant Medications    sulfamethoxazole-trimethoprim (Bactrim DS) 800-160 MG per tablet    Other Relevant Orders    XR Knee 3 View Left (Completed)          1. Suprapatellar bursitis of left knee.      - I recommend that the patient utilize ice over the next 2 days, as well as wear a compression sleeve, or Ace wrap. She will start Bactrim.     Patient Instructions   Bursitis    Bursitis is inflammation and irritation of a bursa, which is one of the small, fluid-filled sacs that cushion and protect the moving parts of your body. These sacs are located between bones and muscles, bones and muscle attachments, or bones and skin areas that are next to bones. A bursa protects those structures from the wear and tear that results from " frequent movement.  An inflamed bursa causes pain and swelling. Fluid may build up inside the sac. Bursitis is most common near joints, especially the knees, elbows, hips, and shoulders.  What are the causes?  This condition may be caused by:  · Injury from:  ? A direct hit (blow), like falling on your knee or elbow.  ? Overuse of a joint (repetitive stress).  · Infection. This can happen if bacteria get into a bursa through a cut or scrape near a joint.  · Diseases that cause joint inflammation, such as gout and rheumatoid arthritis.  What increases the risk?  You are more likely to develop this condition if you:  · Have a job or hobby that involves a lot of repetitive stress on your joints.  · Have a condition that weakens your body's defense system (immune system), such as diabetes, cancer, or HIV.  · Do any of these often:  ? Lift and reach overhead.  ? Kneel or lean on hard surfaces.  ? Run or walk.  What are the signs or symptoms?  The most common symptoms of this condition include:  · Pain that gets worse when you move the affected body part or use it to support (bear) your body weight.  · Inflammation.  · Stiffness.  Other symptoms include:  · Redness.  · Swelling.  · Tenderness.  · Warmth.  · Pain that continues after rest.  · Fever or chills. These may occur in bursitis that is caused by infection.  How is this diagnosed?  This condition may be diagnosed based on:  · Your medical history and a physical exam.  · Imaging tests, such as an MRI.  · A procedure to drain fluid from the bursa with a needle (aspiration). The fluid may be checked for signs of infection or gout.  · Blood tests to rule out other causes of inflammation.  How is this treated?  This condition can usually be treated at home with rest, ice, applying pressure (compression), and raising the body part that is affected (elevation). This is called RICE therapy. For mild bursitis, RICE therapy may be all you need. Other treatments may  include:  · NSAIDs to treat pain and inflammation.  · Corticosteroid medicines to fight inflammation. These medicines may be injected into and around the area of bursitis.  · Aspiration of fluid from the bursa to relieve pain and improve movement.  · Antibiotic medicine to treat an infected bursa.  · A splint, brace, or walking aid, such as a cane.  · Physical therapy if you continue to have pain or limited movement.  · Surgery to remove a damaged or infected bursa. This may be needed if other treatments have not worked.  Follow these instructions at home:  Medicines  · Take over-the-counter and prescription medicines only as told by your health care provider.  · If you were prescribed an antibiotic medicine, take it as told by your health care provider. Do not stop taking the antibiotic even if you start to feel better.  General instructions    · Rest the affected area as told by your health care provider.  ? If possible, raise (elevate) the affected area above the level of your heart while you are sitting or lying down.  ? Avoid activities that make pain worse.  · Use splints, braces, pads, or walking aids as told by your health care provider.  · If directed, put ice on the affected area:  ? If you have a removable splint or brace, remove it as told by your health care provider.  ? Put ice in a plastic bag.  ? Place a towel between your skin and the bag or between your splint or brace and the bag.  ? Leave the ice on for 20 minutes, 2-3 times a day.  · Keep all follow-up visits as told by your health care provider. This is important.  Preventing future episodes  Take actions to help prevent future episodes of bursitis.  · Wear knee pads if you kneel often.  · Wear sturdy running or walking shoes that fit you well.  · Take breaks regularly from repetitive activity.  · Warm up by stretching before doing any activity that takes a lot of effort.  · Maintain a healthy weight or lose weight as recommended by your health  care provider. If you need help doing this, ask your health care provider.  · Exercise regularly. Start any new physical activity gradually.  Contact a health care provider if you:  · Have a fever.  · Have chills.  · Have bursitis that is not getting better with treatment or home care.  Summary  · Bursitis is inflammation and irritation of a bursa, which is one of the small, fluid-filled sacs that cushion and protect the moving parts of your body.  · An inflamed bursa causes pain and swelling.  · Bursitis is commonly diagnosed with a physical exam, but other tests are sometimes needed.  · This condition can usually be treated at home with rest, ice, applying pressure (compression), and raising the body part that is affected (elevation). This is called RICE therapy.  This information is not intended to replace advice given to you by your health care provider. Make sure you discuss any questions you have with your health care provider.  Document Revised: 11/30/2018 Document Reviewed: 11/02/2018  [employer] Patient Education © 2021 [employer]        No follow-ups on file.    Ambulatory progress note signed and attested to by Stepan Ragland D.O.         Scribed for Stepan Ragland DO by Lis Torres.  05/26/21   14:27 EDT    I have personally performed the services described in this document as scribed by the above individual, and it is both accurate and complete.  Stepan Ragland DO  5/27/2021  16:19 EDT

## 2021-05-26 NOTE — PATIENT INSTRUCTIONS
Bursitis    Bursitis is inflammation and irritation of a bursa, which is one of the small, fluid-filled sacs that cushion and protect the moving parts of your body. These sacs are located between bones and muscles, bones and muscle attachments, or bones and skin areas that are next to bones. A bursa protects those structures from the wear and tear that results from frequent movement.  An inflamed bursa causes pain and swelling. Fluid may build up inside the sac. Bursitis is most common near joints, especially the knees, elbows, hips, and shoulders.  What are the causes?  This condition may be caused by:  · Injury from:  ? A direct hit (blow), like falling on your knee or elbow.  ? Overuse of a joint (repetitive stress).  · Infection. This can happen if bacteria get into a bursa through a cut or scrape near a joint.  · Diseases that cause joint inflammation, such as gout and rheumatoid arthritis.  What increases the risk?  You are more likely to develop this condition if you:  · Have a job or hobby that involves a lot of repetitive stress on your joints.  · Have a condition that weakens your body's defense system (immune system), such as diabetes, cancer, or HIV.  · Do any of these often:  ? Lift and reach overhead.  ? Kneel or lean on hard surfaces.  ? Run or walk.  What are the signs or symptoms?  The most common symptoms of this condition include:  · Pain that gets worse when you move the affected body part or use it to support (bear) your body weight.  · Inflammation.  · Stiffness.  Other symptoms include:  · Redness.  · Swelling.  · Tenderness.  · Warmth.  · Pain that continues after rest.  · Fever or chills. These may occur in bursitis that is caused by infection.  How is this diagnosed?  This condition may be diagnosed based on:  · Your medical history and a physical exam.  · Imaging tests, such as an MRI.  · A procedure to drain fluid from the bursa with a needle (aspiration). The fluid may be checked for  signs of infection or gout.  · Blood tests to rule out other causes of inflammation.  How is this treated?  This condition can usually be treated at home with rest, ice, applying pressure (compression), and raising the body part that is affected (elevation). This is called RICE therapy. For mild bursitis, RICE therapy may be all you need. Other treatments may include:  · NSAIDs to treat pain and inflammation.  · Corticosteroid medicines to fight inflammation. These medicines may be injected into and around the area of bursitis.  · Aspiration of fluid from the bursa to relieve pain and improve movement.  · Antibiotic medicine to treat an infected bursa.  · A splint, brace, or walking aid, such as a cane.  · Physical therapy if you continue to have pain or limited movement.  · Surgery to remove a damaged or infected bursa. This may be needed if other treatments have not worked.  Follow these instructions at home:  Medicines  · Take over-the-counter and prescription medicines only as told by your health care provider.  · If you were prescribed an antibiotic medicine, take it as told by your health care provider. Do not stop taking the antibiotic even if you start to feel better.  General instructions    · Rest the affected area as told by your health care provider.  ? If possible, raise (elevate) the affected area above the level of your heart while you are sitting or lying down.  ? Avoid activities that make pain worse.  · Use splints, braces, pads, or walking aids as told by your health care provider.  · If directed, put ice on the affected area:  ? If you have a removable splint or brace, remove it as told by your health care provider.  ? Put ice in a plastic bag.  ? Place a towel between your skin and the bag or between your splint or brace and the bag.  ? Leave the ice on for 20 minutes, 2-3 times a day.  · Keep all follow-up visits as told by your health care provider. This is important.  Preventing future  episodes  Take actions to help prevent future episodes of bursitis.  · Wear knee pads if you kneel often.  · Wear sturdy running or walking shoes that fit you well.  · Take breaks regularly from repetitive activity.  · Warm up by stretching before doing any activity that takes a lot of effort.  · Maintain a healthy weight or lose weight as recommended by your health care provider. If you need help doing this, ask your health care provider.  · Exercise regularly. Start any new physical activity gradually.  Contact a health care provider if you:  · Have a fever.  · Have chills.  · Have bursitis that is not getting better with treatment or home care.  Summary  · Bursitis is inflammation and irritation of a bursa, which is one of the small, fluid-filled sacs that cushion and protect the moving parts of your body.  · An inflamed bursa causes pain and swelling.  · Bursitis is commonly diagnosed with a physical exam, but other tests are sometimes needed.  · This condition can usually be treated at home with rest, ice, applying pressure (compression), and raising the body part that is affected (elevation). This is called RICE therapy.  This information is not intended to replace advice given to you by your health care provider. Make sure you discuss any questions you have with your health care provider.  Document Revised: 11/30/2018 Document Reviewed: 11/02/2018  [employer] Patient Education © 2021 [employer]

## 2021-06-03 ENCOUNTER — OFFICE VISIT (OUTPATIENT)
Dept: FAMILY MEDICINE CLINIC | Facility: CLINIC | Age: 71
End: 2021-06-03

## 2021-06-03 ENCOUNTER — LAB (OUTPATIENT)
Dept: LAB | Facility: HOSPITAL | Age: 71
End: 2021-06-03

## 2021-06-03 VITALS
WEIGHT: 140.6 LBS | RESPIRATION RATE: 16 BRPM | OXYGEN SATURATION: 99 % | HEIGHT: 65 IN | DIASTOLIC BLOOD PRESSURE: 68 MMHG | TEMPERATURE: 97.6 F | SYSTOLIC BLOOD PRESSURE: 108 MMHG | BODY MASS INDEX: 23.43 KG/M2 | HEART RATE: 78 BPM

## 2021-06-03 DIAGNOSIS — Z82.49 FAMILY HISTORY OF HEART DISEASE: ICD-10-CM

## 2021-06-03 DIAGNOSIS — Z23 NEED FOR VACCINATION WITH 13-POLYVALENT PNEUMOCOCCAL CONJUGATE VACCINE: ICD-10-CM

## 2021-06-03 DIAGNOSIS — E78.5 DYSLIPIDEMIA: ICD-10-CM

## 2021-06-03 DIAGNOSIS — E53.8 B12 DEFICIENCY: ICD-10-CM

## 2021-06-03 DIAGNOSIS — K20.0 EOSINOPHILIC ESOPHAGITIS: ICD-10-CM

## 2021-06-03 DIAGNOSIS — Z00.00 WELCOME TO MEDICARE PREVENTIVE VISIT: Primary | ICD-10-CM

## 2021-06-03 DIAGNOSIS — G47.9 DYSSOMNIA: ICD-10-CM

## 2021-06-03 DIAGNOSIS — Z13.6 ENCOUNTER FOR SCREENING FOR CARDIOVASCULAR DISORDERS: ICD-10-CM

## 2021-06-03 DIAGNOSIS — E55.9 VITAMIN D DEFICIENCY: ICD-10-CM

## 2021-06-03 LAB
25(OH)D3 SERPL-MCNC: 58.1 NG/ML
ALBUMIN SERPL-MCNC: 4.9 G/DL (ref 3.5–5.2)
ALBUMIN/GLOB SERPL: 2.3 G/DL
ALP SERPL-CCNC: 59 U/L (ref 39–117)
ALT SERPL W P-5'-P-CCNC: 17 U/L (ref 1–33)
ANION GAP SERPL CALCULATED.3IONS-SCNC: 3.6 MMOL/L (ref 5–15)
AST SERPL-CCNC: 25 U/L (ref 1–32)
BASOPHILS # BLD AUTO: 0.05 10*3/MM3 (ref 0–0.2)
BASOPHILS NFR BLD AUTO: 1.6 % (ref 0–1.5)
BILIRUB SERPL-MCNC: 0.5 MG/DL (ref 0–1.2)
BILIRUB UR QL STRIP: NEGATIVE
BUN SERPL-MCNC: 16 MG/DL (ref 8–23)
BUN/CREAT SERPL: 20.3 (ref 7–25)
CALCIUM SPEC-SCNC: 9.5 MG/DL (ref 8.6–10.5)
CHLORIDE SERPL-SCNC: 103 MMOL/L (ref 98–107)
CHOLEST SERPL-MCNC: 200 MG/DL (ref 0–200)
CLARITY UR: ABNORMAL
CO2 SERPL-SCNC: 30.4 MMOL/L (ref 22–29)
COLOR UR: YELLOW
CREAT SERPL-MCNC: 0.79 MG/DL (ref 0.57–1)
CRP SERPL-MCNC: 0.08 MG/DL (ref 0.01–0.5)
DEPRECATED RDW RBC AUTO: 42.4 FL (ref 37–54)
EOSINOPHIL # BLD AUTO: 0.13 10*3/MM3 (ref 0–0.4)
EOSINOPHIL NFR BLD AUTO: 4.1 % (ref 0.3–6.2)
ERYTHROCYTE [DISTWIDTH] IN BLOOD BY AUTOMATED COUNT: 12.8 % (ref 12.3–15.4)
GFR SERPL CREATININE-BSD FRML MDRD: 72 ML/MIN/1.73
GLOBULIN UR ELPH-MCNC: 2.1 GM/DL
GLUCOSE SERPL-MCNC: 90 MG/DL (ref 65–99)
GLUCOSE UR STRIP-MCNC: NEGATIVE MG/DL
HCT VFR BLD AUTO: 45.5 % (ref 34–46.6)
HDLC SERPL-MCNC: 69 MG/DL (ref 40–60)
HGB BLD-MCNC: 15.1 G/DL (ref 12–15.9)
HGB UR QL STRIP.AUTO: NEGATIVE
IMM GRANULOCYTES # BLD AUTO: 0.01 10*3/MM3 (ref 0–0.05)
IMM GRANULOCYTES NFR BLD AUTO: 0.3 % (ref 0–0.5)
KETONES UR QL STRIP: NEGATIVE
LDLC SERPL CALC-MCNC: 123 MG/DL (ref 0–100)
LDLC/HDLC SERPL: 1.78 {RATIO}
LEUKOCYTE ESTERASE UR QL STRIP.AUTO: NEGATIVE
LYMPHOCYTES # BLD AUTO: 1.17 10*3/MM3 (ref 0.7–3.1)
LYMPHOCYTES NFR BLD AUTO: 36.9 % (ref 19.6–45.3)
MCH RBC QN AUTO: 30 PG (ref 26.6–33)
MCHC RBC AUTO-ENTMCNC: 33.2 G/DL (ref 31.5–35.7)
MCV RBC AUTO: 90.5 FL (ref 79–97)
MONOCYTES # BLD AUTO: 0.29 10*3/MM3 (ref 0.1–0.9)
MONOCYTES NFR BLD AUTO: 9.1 % (ref 5–12)
NEUTROPHILS NFR BLD AUTO: 1.52 10*3/MM3 (ref 1.7–7)
NEUTROPHILS NFR BLD AUTO: 48 % (ref 42.7–76)
NITRITE UR QL STRIP: NEGATIVE
NRBC BLD AUTO-RTO: 0 /100 WBC (ref 0–0.2)
PH UR STRIP.AUTO: 8 [PH] (ref 5–8)
PLATELET # BLD AUTO: 196 10*3/MM3 (ref 140–450)
PMV BLD AUTO: 10.3 FL (ref 6–12)
POTASSIUM SERPL-SCNC: 4.5 MMOL/L (ref 3.5–5.2)
PROT SERPL-MCNC: 7 G/DL (ref 6–8.5)
PROT UR QL STRIP: NEGATIVE
RBC # BLD AUTO: 5.03 10*6/MM3 (ref 3.77–5.28)
SODIUM SERPL-SCNC: 137 MMOL/L (ref 136–145)
SP GR UR STRIP: 1.02 (ref 1–1.03)
TRIGL SERPL-MCNC: 42 MG/DL (ref 0–150)
TSH SERPL DL<=0.05 MIU/L-ACNC: 2.06 UIU/ML (ref 0.27–4.2)
UROBILINOGEN UR QL STRIP: ABNORMAL
VIT B12 BLD-MCNC: 882 PG/ML (ref 211–946)
VLDLC SERPL-MCNC: 8 MG/DL (ref 5–40)
WBC # BLD AUTO: 3.17 10*3/MM3 (ref 3.4–10.8)

## 2021-06-03 PROCEDURE — 82607 VITAMIN B-12: CPT

## 2021-06-03 PROCEDURE — 81003 URINALYSIS AUTO W/O SCOPE: CPT

## 2021-06-03 PROCEDURE — 82785 ASSAY OF IGE: CPT

## 2021-06-03 PROCEDURE — 85025 COMPLETE CBC W/AUTO DIFF WBC: CPT

## 2021-06-03 PROCEDURE — 86141 C-REACTIVE PROTEIN HS: CPT

## 2021-06-03 PROCEDURE — 84443 ASSAY THYROID STIM HORMONE: CPT

## 2021-06-03 PROCEDURE — 80061 LIPID PANEL: CPT

## 2021-06-03 PROCEDURE — 80053 COMPREHEN METABOLIC PANEL: CPT

## 2021-06-03 PROCEDURE — G0402 INITIAL PREVENTIVE EXAM: HCPCS | Performed by: FAMILY MEDICINE

## 2021-06-03 PROCEDURE — 82306 VITAMIN D 25 HYDROXY: CPT

## 2021-06-03 NOTE — PROGRESS NOTES
"The ABCs of the Annual Wellness Visit  Welcome to Medicare Visit    Chief Complaint   Patient presents with   • Annual Exam       Subjective   History of Present Illness:  Tatyana Willett is a 70 y.o. female who presents for a  Welcome to Medicare Visit.    The patient is very pleasant. She is also seen in close to some left knee pain and she was seen on 05/26/2021 for new knee pain and she appeared to have suprapatellar bursitis on exam.    She reports her left knee is doing better and is not as sore; however, it continues to be swollen. She denies that it became redder. She denies that it is locking up on her. She was concerned about the fluid not fully diminishing.     She states that she has noticed that for the past few months, she will wake up and her joints are painful, including her wrists, hands, shoulders, and elbows. The pain seems to get better throughout the day; however, occasionally she is unable to hold anything when she is cooking dinner secondary to her wrist pain. She denies having swelling. She is doing Pilates and yoga to remain active. She previously took turmeric; however, she did not like the taste. She has been taking taking glucosamine and chondroitin daily. She denies having any orthopnea. She states that her hands have been \"tighter\" in the mornings.    She reports that she was cutting a cabbage in half and cut her hand. She had to get stitches and states that her blood pressure at that time was approximately 164/96 mmHg. She is very active and hikes and walks; however, she has been feeling as if it is getting the best of her. She denies having shortness of breath and she is not having to rest an unusual amount.     She continues to utilize a CPAP and believes that it is helping. She denies having a traveling CPAP. She denies having any hospitalization since her last visit and reports that her health is doing well overall. She denies having any constipation or diarrhea.    She is " needing Prevnar and Shingrix. She believes her mammogram is due in 01/2021. She states that she had her last bone density scan in 2017.     HEALTH RISK ASSESSMENT    Recent Hospitalizations:  No hospitalization(s) within the last year.    Current Medical Providers:  Patient Care Team:  Stepan Ragland DO as PCP - General (Family Medicine)  Geeta Dickinson MD as Consulting Physician (Dermatology)    Smoking Status:  Social History     Tobacco Use   Smoking Status Never Smoker   Smokeless Tobacco Never Used       Alcohol Consumption:  Social History     Substance and Sexual Activity   Alcohol Use No       Depression Screen:   PHQ-2/PHQ-9 Depression Screening 6/3/2021   Little interest or pleasure in doing things 0   Feeling down, depressed, or hopeless 0   Total Score 0       Fall Risk Screen:  STEADI Fall Risk Assessment was completed, and patient is at HIGH risk for falls. Assessment completed on:5/26/2021    Health Habits and Functional and Cognitive Screening:  Functional & Cognitive Status 6/3/2021   Do you have difficulty preparing food and eating? No   Do you have difficulty bathing yourself, getting dressed or grooming yourself? No   Do you have difficulty using the toilet? No   Do you have difficulty moving around from place to place? No   Do you have trouble with steps or getting out of a bed or a chair? No   Current Diet Well Balanced Diet   Dental Exam Up to date   Eye Exam Up to date   Exercise (times per week) 7 times per week   Current Exercise Activities Include Yoga   Do you need help using the phone?  No   Are you deaf or do you have serious difficulty hearing?  Yes   Do you need help with transportation? No   Do you need help shopping? No   Do you need help preparing meals?  No   Do you need help with housework?  No   Do you need help with laundry? No   Do you need help taking your medications? No   Do you need help managing money? No   Do you ever drive or ride in a car without wearing a  seat belt? No   Have you felt unusual stress, anger or loneliness in the last month? No   Who do you live with? Spouse   If you need help, do you have trouble finding someone available to you? No   Have you been bothered in the last four weeks by sexual problems? No         Does the patient have evidence of cognitive impairment? No    Asprin use counseling:Does not need AS but is currently taking (discussed benefits vs risks and patient elects to stay on ASA)    Visual Acuity:    No exam data present    Age-appropriate Screening Schedule:  Refer to the list below for future screening recommendations based on patient's age, sex and/or medical conditions. Orders for these recommended tests are listed in the plan section. The patient has been provided with a written plan.    Health Maintenance   Topic Date Due   • ZOSTER VACCINE (2 of 3) 06/20/2016   • DXA SCAN  07/12/2019   • INFLUENZA VACCINE  08/01/2021   • PAP SMEAR  06/03/2022   • MAMMOGRAM  01/26/2023   • TDAP/TD VACCINES (4 - Td or Tdap) 10/08/2028          The following portions of the patient's history were reviewed and updated as appropriate: allergies, current medications, past family history, past medical history, past social history, past surgical history and problem list.    Outpatient Medications Prior to Visit   Medication Sig Dispense Refill   • albuterol (PROAIR HFA) 108 (90 Base) MCG/ACT inhaler Inhale 1 puff Daily As Needed for Shortness of Air. 8.5 inhaler 5   • aspirin 81 MG oral suspension      • budesonide-formoterol (SYMBICORT) 160-4.5 MCG/ACT inhaler Inhale 2 puffs Daily As Needed (allergies). 10.2 g 5   • Calcium Carbonate-Vitamin D (CALTRATE 600+D PO) Take 1 tablet by mouth Daily.     • fluticasone (FLONASE) 50 MCG/ACT nasal spray 1 spray into each nostril Every Morning.     • Glucosamine-Chondroit-Vit C-Mn (GLUCOSAMINE 1500 COMPLEX PO) Take 1 tablet by mouth daily.     • loratadine (CLARITIN) 10 MG tablet Take 1 tablet by mouth daily as  "needed.     • montelukast (SINGULAIR) 10 MG tablet Take 1 tablet by mouth Daily As Needed (During allergy season). 90 tablet 1   • Multiple Vitamins-Minerals (WOMENS ONE DAILY) tablet Take 1 tablet by mouth daily.     • vitamin C (ASCORBIC ACID) 500 MG tablet Take 1 tablet by mouth daily.     • sulfamethoxazole-trimethoprim (Bactrim DS) 800-160 MG per tablet Take 1 tablet by mouth 2 (Two) Times a Day for 14 days. 28 tablet 0     No facility-administered medications prior to visit.       Patient Active Problem List   Diagnosis   • Atopic rhinitis   • Asthma   • Ingrown toenail   • Osteoarthritis of lumbar spine   • Primary localized osteoarthrosis of hand   • Scoliosis   • Dyssomnia   • Preventative health care   • Low back pain   • Throat pain   • GERD (gastroesophageal reflux disease)   • Family history of heart disease   • Eosinophilic esophagitis   • Acute arytenoiditis   • Parasomnia in conditions classified elsewhere   • ANNETTA (obstructive sleep apnea)       Advanced Care Planning:  ACP discussion was held with the patient during this visit. Patient has an advance directive in EMR which is still valid.     Review of Systems    Compared to one year ago, the patient feels her physical health is the same.  Compared to one year ago, the patient feels her mental health is the same.    Reviewed chart for potential of high risk medication in the elderly: yes  Reviewed chart for potential of harmful drug interactions in the elderly:yes    Objective         Vitals:    06/03/21 0823   BP: 108/68   Pulse: 78   Resp: 16   Temp: 97.6 °F (36.4 °C)   SpO2: 99%   Weight: 63.8 kg (140 lb 9.6 oz)   Height: 165.1 cm (65\")       Body mass index is 23.4 kg/m².  Discussed the patient's BMI with her. The BMI is in the acceptable range.    Physical Exam    Lab Results   Component Value Date    TRIG 42 06/03/2021    HDL 69 (H) 06/03/2021     (H) 06/03/2021    VLDL 8 06/03/2021        Procedures    Assessment/Plan   Medicare Risks " and Personalized Health Plan  CMS Preventative Services Quick Reference  Breast Cancer/Mammogram Screening  Diabetic Lab Screening   Immunizations Discussed/Encouraged (specific immunizations; Pneumococcal 23 )  Osteoporosis Risk    The above risks/problems have been discussed with the patient.  Pertinent information has been shared with the patient in the After Visit Summary.  Follow up plans and orders are seen below in the Assessment/Plan Section.    Diagnoses and all orders for this visit:    1. Welcome to Medicare preventive visit (Primary)    2. Family history of heart disease    3. Dyssomnia  -     Urinalysis With Microscopic If Indicated (No Culture) - Urine, Clean Catch; Future  -     TSH; Future    4. Dyslipidemia  -     Lipid Panel; Future  -     Comprehensive Metabolic Panel; Future  -     Urinalysis With Microscopic If Indicated (No Culture) - Urine, Clean Catch; Future  -     TSH; Future    5. Vitamin D deficiency  -     Vitamin D 25 Hydroxy; Future    6. Need for vaccination with 13-polyvalent pneumococcal conjugate vaccine  -     pneumococcal conj. 13-valent (PREVNAR-13) vaccine 0.5 mL    7. Eosinophilic esophagitis  -     High Sensitivity CRP; Future  -     CBC & Differential; Future  -     IgE; Future    8. B12 deficiency  -     Vitamin B12; Future  -     CBC & Differential; Future    9. Encounter for screening for cardiovascular disorders   -     High Sensitivity CRP; Future       1. Welcome to Medicare preventative visit.  - She will receive a Prevnar vaccine today. She will continue to monitor her left knee and notify me if it does not improve. A referral for a bone density scan has been placed. She will get fasting blood work today. She will also trying wearing copper gloves at nighttime to help with her hand tightness and pain.    Problem List Items Addressed This Visit        Family History    Family history of heart disease       Gastrointestinal Abdominal     Eosinophilic esophagitis     Relevant Orders    High Sensitivity CRP (Completed)    CBC & Differential (Completed)    IgE (Completed)       Sleep    Dyssomnia    Relevant Orders    Urinalysis With Microscopic If Indicated (No Culture) - Urine, Clean Catch (Completed)    TSH (Completed)      Other Visit Diagnoses     Welcome to Medicare preventive visit    -  Primary    Dyslipidemia        Relevant Orders    Lipid Panel (Completed)    Comprehensive Metabolic Panel (Completed)    Urinalysis With Microscopic If Indicated (No Culture) - Urine, Clean Catch (Completed)    TSH (Completed)    Vitamin D deficiency        Relevant Orders    Vitamin D 25 Hydroxy (Completed)    Need for vaccination with 13-polyvalent pneumococcal conjugate vaccine        Relevant Medications    pneumococcal conj. 13-valent (PREVNAR-13) vaccine 0.5 mL (Completed)    B12 deficiency        Relevant Orders    Vitamin B12 (Completed)    CBC & Differential (Completed)    Encounter for screening for cardiovascular disorders         Relevant Orders    High Sensitivity CRP (Completed)            Follow Up:  Return in about 1 year (around 6/3/2022) for Medicare Wellness.     An After Visit Summary and PPPS were given to the patient.

## 2021-06-03 NOTE — PATIENT INSTRUCTIONS
1.  You will be called to schedule DEXA    2.  Monitor blood pressure when exercising--less than 40 point jump is expected    3.  Labs today    4.  Prevnar today    5.  Try CopperFit gloves for hand swelling/pain in the morning

## 2021-06-08 LAB — IGE SERPL-ACNC: 295 IU/ML (ref 6–495)

## 2021-09-13 ENCOUNTER — TELEPHONE (OUTPATIENT)
Dept: SLEEP MEDICINE | Facility: HOSPITAL | Age: 71
End: 2021-09-13

## 2021-09-13 DIAGNOSIS — G47.33 OSA (OBSTRUCTIVE SLEEP APNEA): Primary | ICD-10-CM

## 2021-09-13 DIAGNOSIS — G47.8 CATATHRENIA: ICD-10-CM

## 2021-09-21 NOTE — TELEPHONE ENCOUNTER
Patient did return my call.  Patient when originally was having symptoms of sleep moaning we did move forward with sleep study and had an AHI of 10.7.  Patient did deny snoring, excessive daytime sleepiness or morning headaches.  Patient at this time did initiate CPAP therapy.  Patient states she has now retired and has not been using her machine since the Ajit recall.  Patient's  does report the moaning has resolved.  Patient again has no symptoms of snoring, or excessive daytime sleepiness she does wish to move forward with PSG to see at this time if CPAP is still needed.  I have answered all questions and will get this ordered for her.

## 2021-10-15 NOTE — PATIENT INSTRUCTIONS
1.  Increase omeprazole 40 mg - every 12 hours for 30 day trial.    2.  Practice good antireflux strategy - every night.    3.  Consider using Flonase - for swallowing - to suppress eosinophilic esophagitis.    4.  Consider short course of antibiotics - to clear infected sinus drainage.    5.  Consider food allergy testing.    6.  Consider additional testing - from the dentist.    7.  Next checkup January - fasting.   [place] Patient Status:  Hospital Outpatient Surgery   Age/Gender 70year old male MRN [id number]   [facility] SURGERY Attending Reagan Lucero MD   Hosp Day # 0 PCP Harmony Peña MD       Anesthesia Post-op Note

## 2021-11-30 ENCOUNTER — PATIENT MESSAGE (OUTPATIENT)
Dept: FAMILY MEDICINE CLINIC | Facility: CLINIC | Age: 71
End: 2021-11-30

## 2021-11-30 ENCOUNTER — TRANSCRIBE ORDERS (OUTPATIENT)
Dept: ADMINISTRATIVE | Facility: HOSPITAL | Age: 71
End: 2021-11-30

## 2021-11-30 DIAGNOSIS — Z12.31 VISIT FOR SCREENING MAMMOGRAM: Primary | ICD-10-CM

## 2021-11-30 DIAGNOSIS — Z78.0 ASYMPTOMATIC AGE-RELATED POSTMENOPAUSAL STATE: Primary | ICD-10-CM

## 2022-01-27 ENCOUNTER — HOSPITAL ENCOUNTER (OUTPATIENT)
Dept: MAMMOGRAPHY | Facility: HOSPITAL | Age: 72
Discharge: HOME | End: 2022-01-27
Admitting: FAMILY MEDICINE

## 2022-01-27 DIAGNOSIS — Z12.31 VISIT FOR SCREENING MAMMOGRAM: ICD-10-CM

## 2022-01-27 PROCEDURE — 77063 BREAST TOMOSYNTHESIS BI: CPT | Performed by: RADIOLOGY

## 2022-01-27 PROCEDURE — 77067 SCR MAMMO BI INCL CAD: CPT

## 2022-01-27 PROCEDURE — 77067 SCR MAMMO BI INCL CAD: CPT | Performed by: RADIOLOGY

## 2022-01-27 PROCEDURE — 77063 BREAST TOMOSYNTHESIS BI: CPT

## 2022-03-19 DIAGNOSIS — J30.2 SEASONAL ALLERGIC RHINITIS, UNSPECIFIED TRIGGER: ICD-10-CM

## 2022-03-19 DIAGNOSIS — J45.20 MILD INTERMITTENT ASTHMA WITHOUT COMPLICATION: ICD-10-CM

## 2022-03-21 RX ORDER — MONTELUKAST SODIUM 10 MG/1
TABLET ORAL
Qty: 90 TABLET | Refills: 1 | Status: SHIPPED | OUTPATIENT
Start: 2022-03-21 | End: 2023-04-06

## 2022-03-24 DIAGNOSIS — J30.2 SEASONAL ALLERGIC RHINITIS, UNSPECIFIED TRIGGER: ICD-10-CM

## 2022-03-24 DIAGNOSIS — J45.20 MILD INTERMITTENT ASTHMA WITHOUT COMPLICATION: ICD-10-CM

## 2022-03-24 RX ORDER — MONTELUKAST SODIUM 10 MG/1
TABLET ORAL
Qty: 90 TABLET | Refills: 1 | OUTPATIENT
Start: 2022-03-24

## 2022-03-24 NOTE — TELEPHONE ENCOUNTER
Rx Refill Note  Requested Prescriptions     Refused Prescriptions Disp Refills   • montelukast (SINGULAIR) 10 MG tablet [Pharmacy Med Name: MONTELUKAST SOD 10 MG TABLET] 90 tablet 1     Sig: TAKE ONE TABLET BY MOUTH DAILY AS NEEDED     Refused By: FABIANA BARAHONA     Reason for Refusal: Refill not appropriate      Last office visit with prescribing clinician: 6/3/2021      Next office visit with prescribing clinician: 6/3/2022   }  Fabiana Barahona MA  03/24/22, 09:20 EDT     Singulair was sent to pharmacy on 3/21/2022

## 2022-03-30 ENCOUNTER — HOSPITAL ENCOUNTER (OUTPATIENT)
Dept: BONE DENSITY | Facility: HOSPITAL | Age: 72
Discharge: HOME | End: 2022-03-30
Admitting: FAMILY MEDICINE

## 2022-03-30 DIAGNOSIS — Z78.0 ASYMPTOMATIC AGE-RELATED POSTMENOPAUSAL STATE: ICD-10-CM

## 2022-03-30 PROCEDURE — 77080 DXA BONE DENSITY AXIAL: CPT

## 2022-04-22 PROCEDURE — U0004 COV-19 TEST NON-CDC HGH THRU: HCPCS | Performed by: FAMILY MEDICINE

## 2022-04-22 PROCEDURE — U0005 INFEC AGEN DETEC AMPLI PROBE: HCPCS | Performed by: FAMILY MEDICINE

## 2022-04-27 RX ORDER — BUDESONIDE AND FORMOTEROL FUMARATE DIHYDRATE 160; 4.5 UG/1; UG/1
2 AEROSOL RESPIRATORY (INHALATION) DAILY PRN
Qty: 10.2 G | Refills: 5 | Status: SHIPPED | OUTPATIENT
Start: 2022-04-27 | End: 2022-07-27

## 2022-04-27 NOTE — TELEPHONE ENCOUNTER
Rx Refill Note  Requested Prescriptions     Pending Prescriptions Disp Refills   • budesonide-formoterol (Symbicort) 160-4.5 MCG/ACT inhaler 10.2 g 5     Sig: Inhale 2 puffs Daily As Needed (allergies).      Last office visit with prescribing clinician: 6/3/2021      Next office visit with prescribing clinician: 6/3/2022            Dasha Ying MA  04/27/22, 10:01 EDT

## 2022-05-11 ENCOUNTER — OFFICE VISIT (OUTPATIENT)
Dept: FAMILY MEDICINE CLINIC | Facility: CLINIC | Age: 72
End: 2022-05-11

## 2022-05-11 VITALS
DIASTOLIC BLOOD PRESSURE: 80 MMHG | BODY MASS INDEX: 22.73 KG/M2 | OXYGEN SATURATION: 99 % | RESPIRATION RATE: 18 BRPM | WEIGHT: 136.4 LBS | SYSTOLIC BLOOD PRESSURE: 124 MMHG | HEART RATE: 69 BPM | HEIGHT: 65 IN

## 2022-05-11 DIAGNOSIS — S93.491A SPRAIN OF ANTERIOR TALOFIBULAR LIGAMENT OF RIGHT ANKLE, INITIAL ENCOUNTER: Primary | ICD-10-CM

## 2022-05-11 PROCEDURE — 99213 OFFICE O/P EST LOW 20 MIN: CPT | Performed by: FAMILY MEDICINE

## 2022-05-11 NOTE — PROGRESS NOTES
"Established Patient Office Visit      Patient Name: Tatyana Willett  : 1950   MRN: [id number]   Care Team: Patient Care Team:  Stepan Ragland DO as PCP - General (Family Medicine)  Geeta Dickinson MD as Consulting Physician (Dermatology)    Chief Complaint:    Chief Complaint   Patient presents with   • Ankle Pain     Pt twisted ankle around a month ago. Pt stated it does not hurt when she walks only when she moves it inward.        History of Present Illness: Tatyana Willett is a 71 y.o. female who is here today for chief complaint.    HPI    The patient presents today for an acute new issue of right ankle pain.    The patient reports that she was on a ladder when she was helping with a boat and pushing against the boat, and the ladder slipped out from under her. She states that she twisted her ankle. She reports that it swelled a little bit, but it was not bruised. She reports that she could walk fine. She reports that she can still walk fine. She reports that it does not hurt at all, but when she turns her foot inward, and when she sleeps at night, if she is on her stomach, and there is any pressure on that area, it just \"kills\". She reports that she did not go into a splint or a boot at any point. She reports that she wrapped it and ACE bandage for a few days right after it happened. She reports that the pain is not really changing. She reports that some days are worse than others. She reports that it does not hurt if she is regularly walking on it.    This patient is accompanied by their self who contributes to the history of their care.    The following portions of the patient's history were reviewed and updated as appropriate: allergies, current medications, past family history, past medical history, past social history, past surgical history and problem list.    Subjective      Review of Systems:   Review of Systems - See HPI    Past Medical History:   Past Medical History: "   Diagnosis Date   • Allergic    • Allergic rhinitis Lifelong    Moderately severe with nasal polyps   • Asthma 2011    Severe flare with bronchoscopy from pine needle exposure   • CTS (carpal tunnel syndrome) 2004    Bilateral   • Eosinophilic esophagitis 2018    Biopsy-positive   • GERD (gastroesophageal reflux disease) 2016   • Lumbar scoliosis 2013    Recurrent discomfort   • Lumbar spinal stenosis 2013   • Migraine 2004    hospitalized   • Mononucleosis    • Osteoarthritis of hands, bilateral    • Post menopausal syndrome     Hot flashes with impaired sleep   • Sleep disorder    • Varicose vein of leg     venous ligation left leg       Past Surgical History:   Past Surgical History:   Procedure Laterality Date   • BRONCHOSCOPY      Severe allergic pneumonitis   • COLONOSCOPY      Benign study   • FRACTURE SURGERY  2009    left pinkie finger   • LARYNGOSCOPY  2017    Indirect exam   • SQUAMOUS CELL CARCINOMA EXCISION Left     L leg   • TONSILLECTOMY     • TONSILLECTOMY     • VEIN SURGERY Left     Ligation of left leg       Family History:   Family History   Problem Relation Age of Onset   • Dementia Mother    • Hearing loss Mother    • Osteoarthritis Mother    • Esophageal cancer Father           age 60   • Cancer Father         Keller's esophagus   • Achalasia Brother    • Osteoarthritis Brother    • Pancreatic cancer Brother    • Pancreatic cancer Maternal Grandmother    • Cancer Maternal Grandmother         Pancreatic   • Heart failure Paternal Grandmother    • Heart attack Paternal Grandfather          age 65   • Osteoarthritis Brother         Bilateral hip replacements   • Breast cancer Neg Hx    • Ovarian cancer Neg Hx        Social History:   Social History     Socioeconomic History   • Marital status:    Tobacco Use   • Smoking status: Never Smoker   • Smokeless tobacco: Never Used   Substance and Sexual Activity   • Alcohol use: No   • Drug use: No   • Sexual  "activity: Yes     Partners: Male       Tobacco History:   Social History     Tobacco Use   Smoking Status Never Smoker   Smokeless Tobacco Never Used       Medications:     Current Outpatient Medications:   •  albuterol (PROAIR HFA) 108 (90 Base) MCG/ACT inhaler, Inhale 1 puff Daily As Needed for Shortness of Air., Disp: 8.5 inhaler, Rfl: 5  •  aspirin 81 MG oral suspension, , Disp: , Rfl:   •  Calcium Carbonate-Vitamin D (CALTRATE 600+D PO), Take 1 tablet by mouth Daily., Disp: , Rfl:   •  cephalexin (KEFLEX) 500 MG capsule, Take 1 capsule by mouth 3 (Three) Times a Day., Disp: 21 capsule, Rfl: 0  •  fluticasone (FLONASE) 50 MCG/ACT nasal spray, 1 spray into each nostril Every Morning., Disp: , Rfl:   •  Glucosamine-Chondroit-Vit C-Mn (GLUCOSAMINE 1500 COMPLEX PO), Take 1 tablet by mouth daily., Disp: , Rfl:   •  loratadine (CLARITIN) 10 MG tablet, Take 1 tablet by mouth daily as needed., Disp: , Rfl:   •  montelukast (SINGULAIR) 10 MG tablet, TAKE ONE TABLET BY MOUTH DAILY AS NEEDED, Disp: 90 tablet, Rfl: 1  •  Multiple Vitamins-Minerals (WOMENS ONE DAILY) tablet, Take 1 tablet by mouth daily., Disp: , Rfl:   •  vitamin C (ASCORBIC ACID) 500 MG tablet, Take 1 tablet by mouth daily., Disp: , Rfl:   •  budesonide-formoterol (Symbicort) 160-4.5 MCG/ACT inhaler, Inhale 2 puffs Daily As Needed (allergies)., Disp: 10.2 g, Rfl: 5    Allergies:   Allergies   Allergen Reactions   • Shellfish Allergy Hives     oysters   • Poison Ivy Extract [Poison Ivy Extract] Rash       Objective   Objective     Physical Exam:  Vital Signs:   Vitals:    05/11/22 0838   BP: 124/80   Pulse: 69   Resp: 18   SpO2: 99%   Weight: 61.9 kg (136 lb 6.4 oz)   Height: 165.1 cm (65\")     Body mass index is 22.7 kg/m².     Physical Exam  Nursing note reviewed  Const: NAD, A&Ox4, Pleasant, Cooperative  Eyes: EOMI, no conjunctivitis  ENT: No nasal discharge present, neck supple  Cardiac: Regular rate and rhythm, no cyanosis  Resp: Respiratory rate " within normal limits, no increased work of breathing, no audible wheezing or retractions noted  GI: No distention or ascites  MSK: Motor and sensation grossly intact in bilateral upper extremities. Right ankle no tenderness over the ATFL. No tenderness along the peroneal.  Neurologic: CN II-XII grossly intact  Psych: Appropriate mood and behavior.  Skin: Warm, dry  Procedures/Radiology     Procedures  No radiology results for the last 7 days     Assessment & Plan   Assessment / Plan      Assessment/Plan:   Problems Addressed This Visit  Diagnoses and all orders for this visit:    1. Sprain of anterior talofibular ligament of right ankle, initial encounter (Primary)  -     XR Ankle 3+ View Right; Future      Problem List Items Addressed This Visit    None     Visit Diagnoses     Sprain of anterior talofibular ligament of right ankle, initial encounter    -  Primary    Relevant Orders    XR Ankle 3+ View Right          1. Sprain of the right ATFL.  - There is no sign of OCD lesion or concern for fracture on exam today. She has no tenderness along the peroneal tendons. I recommend a lace-up ankle brace for the next 2 weeks, daily home therapy exercises provided. If she is still having pain in a couple of weeks, I recommended she get the x-ray completed so we can make sure we have everything squared away before her trip to [place] in 06/2022.    There are no Patient Instructions on file for this visit.    Follow Up:   No follow-ups on file.        DO GREGG Mast RD  [facility]  [address] [facility] [address]  Fax [phone]  Phone [phone]       Transcribed from ambient dictation for Stepan Ragland DO by Liset Kc  05/11/22   10:15 EDT    Patient verbalized consent to the visit recording.

## 2022-06-10 ENCOUNTER — HOSPITAL ENCOUNTER (OUTPATIENT)
Dept: GENERAL RADIOLOGY | Facility: HOSPITAL | Age: 72
Discharge: HOME | End: 2022-06-10
Admitting: FAMILY MEDICINE

## 2022-06-10 DIAGNOSIS — S93.491A SPRAIN OF ANTERIOR TALOFIBULAR LIGAMENT OF RIGHT ANKLE, INITIAL ENCOUNTER: ICD-10-CM

## 2022-06-10 PROCEDURE — 73610 X-RAY EXAM OF ANKLE: CPT

## 2022-07-27 ENCOUNTER — LAB (OUTPATIENT)
Dept: LAB | Facility: HOSPITAL | Age: 72
End: 2022-07-27

## 2022-07-27 ENCOUNTER — OFFICE VISIT (OUTPATIENT)
Dept: FAMILY MEDICINE CLINIC | Facility: CLINIC | Age: 72
End: 2022-07-27

## 2022-07-27 VITALS
OXYGEN SATURATION: 96 % | WEIGHT: 133.2 LBS | SYSTOLIC BLOOD PRESSURE: 110 MMHG | HEIGHT: 65 IN | DIASTOLIC BLOOD PRESSURE: 80 MMHG | RESPIRATION RATE: 16 BRPM | HEART RATE: 66 BPM | BODY MASS INDEX: 22.19 KG/M2

## 2022-07-27 DIAGNOSIS — E53.8 B12 DEFICIENCY: ICD-10-CM

## 2022-07-27 DIAGNOSIS — Z13.29 SCREENING FOR ENDOCRINE DISORDER: ICD-10-CM

## 2022-07-27 DIAGNOSIS — Z13.0 SCREENING FOR DEFICIENCY ANEMIA: ICD-10-CM

## 2022-07-27 DIAGNOSIS — E55.9 VITAMIN D DEFICIENCY: ICD-10-CM

## 2022-07-27 DIAGNOSIS — Z00.00 MEDICARE ANNUAL WELLNESS VISIT, SUBSEQUENT: Primary | ICD-10-CM

## 2022-07-27 DIAGNOSIS — J45.20 MILD INTERMITTENT ASTHMA WITHOUT COMPLICATION: ICD-10-CM

## 2022-07-27 DIAGNOSIS — E78.5 DYSLIPIDEMIA: ICD-10-CM

## 2022-07-27 DIAGNOSIS — G47.33 OSA (OBSTRUCTIVE SLEEP APNEA): ICD-10-CM

## 2022-07-27 DIAGNOSIS — Z91.81 AT MODERATE RISK FOR FALL: ICD-10-CM

## 2022-07-27 LAB
25(OH)D3 SERPL-MCNC: 55.5 NG/ML (ref 30–100)
ALBUMIN SERPL-MCNC: 4.5 G/DL (ref 3.5–5.2)
ALBUMIN/GLOB SERPL: 2.3 G/DL
ALP SERPL-CCNC: 53 U/L (ref 39–117)
ALT SERPL W P-5'-P-CCNC: 18 U/L (ref 1–33)
ANION GAP SERPL CALCULATED.3IONS-SCNC: 12.4 MMOL/L (ref 5–15)
AST SERPL-CCNC: 26 U/L (ref 1–32)
BILIRUB SERPL-MCNC: 0.4 MG/DL (ref 0–1.2)
BILIRUB UR QL STRIP: NEGATIVE
BUN SERPL-MCNC: 13 MG/DL (ref 8–23)
BUN/CREAT SERPL: 14.9 (ref 7–25)
CALCIUM SPEC-SCNC: 9.2 MG/DL (ref 8.6–10.5)
CHLORIDE SERPL-SCNC: 107 MMOL/L (ref 98–107)
CHOLEST SERPL-MCNC: 198 MG/DL (ref 0–200)
CLARITY UR: CLEAR
CO2 SERPL-SCNC: 26.6 MMOL/L (ref 22–29)
COLOR UR: YELLOW
CREAT SERPL-MCNC: 0.87 MG/DL (ref 0.57–1)
DEPRECATED RDW RBC AUTO: 38.4 FL (ref 37–54)
EGFRCR SERPLBLD CKD-EPI 2021: 71.3 ML/MIN/1.73
ERYTHROCYTE [DISTWIDTH] IN BLOOD BY AUTOMATED COUNT: 12.4 % (ref 12.3–15.4)
GLOBULIN UR ELPH-MCNC: 2 GM/DL
GLUCOSE SERPL-MCNC: 77 MG/DL (ref 65–99)
GLUCOSE UR STRIP-MCNC: NEGATIVE MG/DL
HBA1C MFR BLD: 5.1 % (ref 4.8–5.6)
HCT VFR BLD AUTO: 42.7 % (ref 34–46.6)
HDLC SERPL-MCNC: 71 MG/DL (ref 40–60)
HGB BLD-MCNC: 14.7 G/DL (ref 12–15.9)
HGB UR QL STRIP.AUTO: NEGATIVE
KETONES UR QL STRIP: NEGATIVE
LDLC SERPL CALC-MCNC: 119 MG/DL (ref 0–100)
LDLC/HDLC SERPL: 1.67 {RATIO}
LEUKOCYTE ESTERASE UR QL STRIP.AUTO: NEGATIVE
MCH RBC QN AUTO: 29.8 PG (ref 26.6–33)
MCHC RBC AUTO-ENTMCNC: 34.4 G/DL (ref 31.5–35.7)
MCV RBC AUTO: 86.6 FL (ref 79–97)
NITRITE UR QL STRIP: NEGATIVE
PH UR STRIP.AUTO: 7.5 [PH] (ref 5–8)
PLATELET # BLD AUTO: 197 10*3/MM3 (ref 140–450)
PMV BLD AUTO: 10.1 FL (ref 6–12)
POTASSIUM SERPL-SCNC: 4.5 MMOL/L (ref 3.5–5.2)
PROT SERPL-MCNC: 6.5 G/DL (ref 6–8.5)
PROT UR QL STRIP: NEGATIVE
RBC # BLD AUTO: 4.93 10*6/MM3 (ref 3.77–5.28)
SODIUM SERPL-SCNC: 146 MMOL/L (ref 136–145)
SP GR UR STRIP: 1.02 (ref 1–1.03)
TRIGL SERPL-MCNC: 43 MG/DL (ref 0–150)
TSH SERPL DL<=0.05 MIU/L-ACNC: 3.16 UIU/ML (ref 0.27–4.2)
UROBILINOGEN UR QL STRIP: NORMAL
VIT B12 BLD-MCNC: 1060 PG/ML (ref 211–946)
VLDLC SERPL-MCNC: 8 MG/DL (ref 5–40)
WBC NRBC COR # BLD: 3.47 10*3/MM3 (ref 3.4–10.8)

## 2022-07-27 PROCEDURE — 85027 COMPLETE CBC AUTOMATED: CPT

## 2022-07-27 PROCEDURE — 82306 VITAMIN D 25 HYDROXY: CPT

## 2022-07-27 PROCEDURE — G0439 PPPS, SUBSEQ VISIT: HCPCS | Performed by: FAMILY MEDICINE

## 2022-07-27 PROCEDURE — 1170F FXNL STATUS ASSESSED: CPT | Performed by: FAMILY MEDICINE

## 2022-07-27 PROCEDURE — 1160F RVW MEDS BY RX/DR IN RCRD: CPT | Performed by: FAMILY MEDICINE

## 2022-07-27 PROCEDURE — 84443 ASSAY THYROID STIM HORMONE: CPT

## 2022-07-27 PROCEDURE — 83036 HEMOGLOBIN GLYCOSYLATED A1C: CPT

## 2022-07-27 PROCEDURE — 82607 VITAMIN B-12: CPT

## 2022-07-27 PROCEDURE — 81003 URINALYSIS AUTO W/O SCOPE: CPT

## 2022-07-27 PROCEDURE — 80053 COMPREHEN METABOLIC PANEL: CPT

## 2022-07-27 PROCEDURE — 80061 LIPID PANEL: CPT

## 2022-07-27 RX ORDER — FAMOTIDINE 20 MG
1000 TABLET ORAL DAILY
COMMUNITY

## 2022-07-27 RX ORDER — FLUTICASONE PROPIONATE AND SALMETEROL XINAFOATE 115; 21 UG/1; UG/1
2 AEROSOL, METERED RESPIRATORY (INHALATION)
Qty: 8 G | Refills: 11 | Status: SHIPPED | OUTPATIENT
Start: 2022-07-27 | End: 2023-01-12

## 2022-10-03 ENCOUNTER — OFFICE VISIT (OUTPATIENT)
Dept: FAMILY MEDICINE CLINIC | Facility: CLINIC | Age: 72
End: 2022-10-03

## 2022-10-03 VITALS
DIASTOLIC BLOOD PRESSURE: 70 MMHG | WEIGHT: 139.2 LBS | OXYGEN SATURATION: 97 % | SYSTOLIC BLOOD PRESSURE: 110 MMHG | TEMPERATURE: 98.3 F | BODY MASS INDEX: 23.16 KG/M2 | HEART RATE: 73 BPM

## 2022-10-03 DIAGNOSIS — Z23 IMMUNIZATION DUE: Primary | ICD-10-CM

## 2022-10-03 DIAGNOSIS — M65.9 FLEXOR TENOSYNOVITIS OF FINGER: ICD-10-CM

## 2022-10-03 PROCEDURE — 99213 OFFICE O/P EST LOW 20 MIN: CPT | Performed by: FAMILY MEDICINE

## 2022-10-03 PROCEDURE — 90662 IIV NO PRSV INCREASED AG IM: CPT | Performed by: FAMILY MEDICINE

## 2022-10-03 PROCEDURE — G0008 ADMIN INFLUENZA VIRUS VAC: HCPCS | Performed by: FAMILY MEDICINE

## 2022-10-03 RX ORDER — MELOXICAM 7.5 MG/1
7.5 TABLET ORAL DAILY
Qty: 30 TABLET | Refills: 0 | Status: SHIPPED | OUTPATIENT
Start: 2022-10-03 | End: 2022-12-19

## 2022-10-03 NOTE — PROGRESS NOTES
"Established Patient Office Visit      Patient Name: Tatyana Willett  : 1950   MRN: [id number]   Care Team: Patient Care Team:  Stepan Ragland DO as PCP - General (Family Medicine)  Geeta Dickinson MD as Consulting Physician (Dermatology)    Chief Complaint:    Chief Complaint   Patient presents with   • Hand Pain     Right middle finger stiff, weak, and swollen for approximately one month       History of Present Illness: Tatyana Willett is a 72 y.o. female who is here today for chief complaint.    HPI    The patient presents for an acute visit regarding right middle finger pain and stiffness.     The patient reports pain in the middle joint of her right middle finger for the past month, reaching a 10/10 painful at night. She states her pain is made worse with pressure and occasionally feels swollen. She denies any preceding injury or pain along her bones and has not had x-ray imaging of her hand. She shares she has been sailing frequently and \"really tugging on that line\". She has taken Advil with relief.    This patient is accompanied by their self who contributes to the history of their care.    The following portions of the patient's history were reviewed and updated as appropriate: allergies, current medications, past family history, past medical history, past social history, past surgical history and problem list.    Subjective      Review of Systems:   Review of Systems - See HPI    Past Medical History:   Past Medical History:   Diagnosis Date   • Allergic    • Allergic rhinitis Lifelong    Moderately severe with nasal polyps   • Asthma 2011    Severe flare with bronchoscopy from pine needle exposure   • CTS (carpal tunnel syndrome) 2004    Bilateral   • Eosinophilic esophagitis 2018    Biopsy-positive   • GERD (gastroesophageal reflux disease) 2016   • Lumbar scoliosis 2013    Recurrent discomfort   • Lumbar spinal stenosis 2013   • Migraine 2004    hospitalized   • Mononucleosis "    • Osteoarthritis of hands, bilateral    • Post menopausal syndrome     Hot flashes with impaired sleep   • Sleep disorder    • Varicose vein of leg     venous ligation left leg       Past Surgical History:   Past Surgical History:   Procedure Laterality Date   • BRONCHOSCOPY      Severe allergic pneumonitis   • COLONOSCOPY      Benign study   • FRACTURE SURGERY      left pinkie finger   • LARYNGOSCOPY  2017    Indirect exam   • SKIN SURGERY      BASIL CELL   • SQUAMOUS CELL CARCINOMA EXCISION Left     L leg   • TONSILLECTOMY     • TONSILLECTOMY     • VEIN SURGERY Left     Ligation of left leg       Family History:   Family History   Problem Relation Age of Onset   • Dementia Mother    • Hearing loss Mother    • Osteoarthritis Mother    • Esophageal cancer Father           age 60   • Cancer Father         Keller's esophagus   • Achalasia Brother    • Osteoarthritis Brother    • Pancreatic cancer Brother    • Pancreatic cancer Maternal Grandmother    • Cancer Maternal Grandmother         Pancreatic   • Heart failure Paternal Grandmother    • Heart attack Paternal Grandfather          age 65   • Osteoarthritis Brother         Bilateral hip replacements   • Breast cancer Neg Hx    • Ovarian cancer Neg Hx        Social History:   Social History     Socioeconomic History   • Marital status:    Tobacco Use   • Smoking status: Never   • Smokeless tobacco: Never   Vaping Use   • Vaping Use: Never used   Substance and Sexual Activity   • Alcohol use: No   • Drug use: No   • Sexual activity: Yes     Partners: Male       Tobacco History:   Social History     Tobacco Use   Smoking Status Never   Smokeless Tobacco Never       Medications:     Current Outpatient Medications:   •  Calcium Carbonate-Vitamin D (CALTRATE 600+D PO), Take 1 tablet by mouth Daily., Disp: , Rfl:   •  fluticasone (FLONASE) 50 MCG/ACT nasal spray, 1 spray into each nostril Every Morning., Disp: , Rfl:   •   Glucosamine-Chondroit-Vit C-Mn (GLUCOSAMINE 1500 COMPLEX PO), Take 1 tablet by mouth daily., Disp: , Rfl:   •  loratadine (CLARITIN) 10 MG tablet, Take 1 tablet by mouth daily as needed., Disp: , Rfl:   •  montelukast (SINGULAIR) 10 MG tablet, TAKE ONE TABLET BY MOUTH DAILY AS NEEDED, Disp: 90 tablet, Rfl: 1  •  Multiple Vitamins-Minerals (WOMENS ONE DAILY) tablet, Take 1 tablet by mouth daily., Disp: , Rfl:   •  vitamin C (ASCORBIC ACID) 500 MG tablet, Take 1 tablet by mouth daily., Disp: , Rfl:   •  Vitamin D, Cholecalciferol, 25 MCG (1000 UT) capsule, Take 1,000 Units by mouth Daily., Disp: , Rfl:   •  albuterol (PROAIR HFA) 108 (90 Base) MCG/ACT inhaler, Inhale 1 puff Daily As Needed for Shortness of Air., Disp: 8.5 inhaler, Rfl: 5  •  fluticasone-salmeterol (Advair HFA) 115-21 MCG/ACT inhaler, Inhale 2 puffs 2 (Two) Times a Day., Disp: 8 g, Rfl: 11  •  meloxicam (Mobic) 7.5 MG tablet, Take 1 tablet by mouth Daily., Disp: 30 tablet, Rfl: 0    Allergies:   Allergies   Allergen Reactions   • Shellfish Allergy Hives     oysters   • Poison Ivy Extract [Poison Ivy Extract] Rash       Objective   Objective     Physical Exam:  Vital Signs:   Vitals:    10/03/22 1609   BP: 110/70   BP Location: Left arm   Patient Position: Sitting   Cuff Size: Adult   Pulse: 73   Temp: 98.3 °F (36.8 °C)   TempSrc: Infrared   SpO2: 97%   Weight: 63.1 kg (139 lb 3.2 oz)     Body mass index is 23.16 kg/m².     Physical Exam  Nursing note reviewed  Const: NAD, A&Ox4, Pleasant, Cooperative  Eyes: EOMI, no conjunctivitis  ENT: No nasal discharge present, neck supple  Cardiac: Regular rate and rhythm, no cyanosis  Resp: Respiratory rate within normal limits, no increased work of breathing, no audible wheezing or retractions noted  GI: No distention or ascites  MSK: Motor and sensation grossly intact in bilateral upper extremities  Neurologic: CN II-XII grossly intact  Psych: Appropriate mood and behavior.  Skin: Warm, dry  Procedures/Radiology      Procedures  No radiology results for the last 7 days     Assessment & Plan   Assessment / Plan      Assessment/Plan:   Problems Addressed This Visit  Diagnoses and all orders for this visit:    1. Immunization due (Primary)  -     Fluzone High-Dose 65+yrs (2990-1047)    2. Flexor tenosynovitis of finger  -     XR Hand 3+ View Right; Future  -     meloxicam (Mobic) 7.5 MG tablet; Take 1 tablet by mouth Daily.  Dispense: 30 tablet; Refill: 0      Problem List Items Addressed This Visit    None  Visit Diagnoses     Immunization due    -  Primary    Relevant Orders    Fluzone High-Dose 65+yrs (9882-0259) (Completed)    Flexor tenosynovitis of finger        Relevant Medications    meloxicam (Mobic) 7.5 MG tablet    Other Relevant Orders    XR Hand 3+ View Right (Completed)          The patient has some nodule formation in the flexor tendon proximal to the A1 pulley. She was instructed she could use tape around the PIP to lock the joint out to provide some relief. She can use ibuprofen for pain relief at night, ice, Voltaren gel, and meloxicam as needed. She will follow-up as needed.     There are no Patient Instructions on file for this visit.    Follow Up:   No follow-ups on file.      MDM       [facility] [address]  [facility]  [address]  [facility] [address]  Fax [phone]  Phone [phone]     Transcribed from ambient dictation for Stepan Ragland DO by Dar Morales.  10/03/22   17:08 EDT    I have personally performed the services described in this document as transcribed by the above individual, and it is both accurate and complete.  Patient verbalized consent to the visit recording.

## 2022-10-04 ENCOUNTER — HOSPITAL ENCOUNTER (OUTPATIENT)
Dept: GENERAL RADIOLOGY | Facility: HOSPITAL | Age: 72
Discharge: HOME | End: 2022-10-04
Admitting: FAMILY MEDICINE

## 2022-10-04 DIAGNOSIS — M65.9 FLEXOR TENOSYNOVITIS OF FINGER: ICD-10-CM

## 2022-10-04 PROCEDURE — 73130 X-RAY EXAM OF HAND: CPT

## 2022-10-31 ENCOUNTER — TELEPHONE (OUTPATIENT)
Dept: FAMILY MEDICINE CLINIC | Facility: CLINIC | Age: 72
End: 2022-10-31

## 2022-12-14 ENCOUNTER — TRANSCRIBE ORDERS (OUTPATIENT)
Dept: ADMINISTRATIVE | Facility: HOSPITAL | Age: 72
End: 2022-12-14

## 2022-12-14 DIAGNOSIS — Z12.31 VISIT FOR SCREENING MAMMOGRAM: Primary | ICD-10-CM

## 2022-12-19 ENCOUNTER — OFFICE VISIT (OUTPATIENT)
Dept: FAMILY MEDICINE CLINIC | Facility: CLINIC | Age: 72
End: 2022-12-19

## 2022-12-19 VITALS
SYSTOLIC BLOOD PRESSURE: 122 MMHG | WEIGHT: 136.4 LBS | BODY MASS INDEX: 22.7 KG/M2 | OXYGEN SATURATION: 97 % | HEART RATE: 76 BPM | DIASTOLIC BLOOD PRESSURE: 78 MMHG | TEMPERATURE: 97.8 F

## 2022-12-19 DIAGNOSIS — M72.2 PLANTAR FASCIAL FIBROMATOSIS OF LEFT FOOT: Primary | ICD-10-CM

## 2022-12-19 PROCEDURE — 99213 OFFICE O/P EST LOW 20 MIN: CPT | Performed by: FAMILY MEDICINE

## 2022-12-19 NOTE — PROGRESS NOTES
Established Patient Office Visit      Patient Name: Tatyana Willett  : 1950   MRN: [id number]   Care Team: Patient Care Team:  Stepan Ragland DO as PCP - General (Family Medicine)  Geeta Dickinson MD as Consulting Physician (Dermatology)    Chief Complaint:    Chief Complaint   Patient presents with   • Follow-up     Lump on bottom of left foot, unknown cause, not painful       History of Present Illness: Tatyana Willett is a 72 y.o. female who is here today for chief complaint.    HPI    The patient reports that her fingers are doing better and she feels like she is making progress with this. She states that she is still having issues, but she thinks she is going to be conservative in her treatment and keep on going.    The patient reports that she has a lump on her L foot that is tender. She states that she experiences pain when she ambulates. She reports that she noticed it approximately 1 month ago. She denies putting anything on this lump to treat it. She reports that she stepped on a belt, which caused irritation, however, the lump was present before this incident. She mentions that she has seen Dr. Morel for podiatry in the past, however, not recently. She reports that the bunions on her bilateral feet do not cause her pain. She notes that she does a lot of walking, and she has always worn Frank inserts in her shoes. She reports that she was informed she does not need inserts because her shoes have such good arch support to begin with, so she has discontinued use of inserts in her shoes.      The following portions of the patient's history were reviewed and updated as appropriate: allergies, current medications, past family history, past medical history, past social history, past surgical history and problem list.    Subjective      Review of Systems:   Review of Systems - See HPI    Past Medical History:   Past Medical History:   Diagnosis Date   • Allergic    • Allergic rhinitis  Lifelong    Moderately severe with nasal polyps   • Asthma 2011    Severe flare with bronchoscopy from pine needle exposure   • CTS (carpal tunnel syndrome) 2004    Bilateral   • Eosinophilic esophagitis 2018    Biopsy-positive   • GERD (gastroesophageal reflux disease) 2016   • Lumbar scoliosis 2013    Recurrent discomfort   • Lumbar spinal stenosis 2013   • Migraine 2004    hospitalized   • Mononucleosis    • Osteoarthritis of hands, bilateral    • Post menopausal syndrome     Hot flashes with impaired sleep   • Sleep disorder    • Varicose vein of leg     venous ligation left leg       Past Surgical History:   Past Surgical History:   Procedure Laterality Date   • BRONCHOSCOPY      Severe allergic pneumonitis   • COLONOSCOPY      Benign study   • FRACTURE SURGERY  2009    left pinkie finger   • LARYNGOSCOPY  2017    Indirect exam   • SKIN SURGERY      BASIL CELL   • SQUAMOUS CELL CARCINOMA EXCISION Left     L leg   • TONSILLECTOMY     • TONSILLECTOMY     • VEIN SURGERY Left     Ligation of left leg       Family History:   Family History   Problem Relation Age of Onset   • Dementia Mother    • Hearing loss Mother    • Osteoarthritis Mother    • Esophageal cancer Father           age 60   • Cancer Father         Keller's esophagus   • Achalasia Brother    • Osteoarthritis Brother    • Pancreatic cancer Brother    • Pancreatic cancer Maternal Grandmother    • Cancer Maternal Grandmother         Pancreatic   • Heart failure Paternal Grandmother    • Heart attack Paternal Grandfather          age 65   • Osteoarthritis Brother         Bilateral hip replacements   • Breast cancer Neg Hx    • Ovarian cancer Neg Hx        Social History:   Social History     Socioeconomic History   • Marital status:    Tobacco Use   • Smoking status: Never   • Smokeless tobacco: Never   Vaping Use   • Vaping Use: Never used   Substance and Sexual Activity   • Alcohol use: No   • Drug use: No   •  Sexual activity: Yes     Partners: Male       Tobacco History:   Social History     Tobacco Use   Smoking Status Never   Smokeless Tobacco Never       Medications:     Current Outpatient Medications:   •  albuterol (PROAIR HFA) 108 (90 Base) MCG/ACT inhaler, Inhale 1 puff Daily As Needed for Shortness of Air., Disp: 8.5 inhaler, Rfl: 5  •  Calcium Carbonate-Vitamin D (CALTRATE 600+D PO), Take 1 tablet by mouth Daily., Disp: , Rfl:   •  fluticasone (FLONASE) 50 MCG/ACT nasal spray, 1 spray into each nostril Every Morning., Disp: , Rfl:   •  Glucosamine-Chondroit-Vit C-Mn (GLUCOSAMINE 1500 COMPLEX PO), Take 1 tablet by mouth daily., Disp: , Rfl:   •  loratadine (CLARITIN) 10 MG tablet, Take 1 tablet by mouth daily as needed., Disp: , Rfl:   •  montelukast (SINGULAIR) 10 MG tablet, TAKE ONE TABLET BY MOUTH DAILY AS NEEDED, Disp: 90 tablet, Rfl: 1  •  Multiple Vitamins-Minerals (WOMENS ONE DAILY) tablet, Take 1 tablet by mouth daily., Disp: , Rfl:   •  vitamin C (ASCORBIC ACID) 500 MG tablet, Take 1 tablet by mouth daily., Disp: , Rfl:   •  Vitamin D, Cholecalciferol, 25 MCG (1000 UT) capsule, Take 1,000 Units by mouth Daily., Disp: , Rfl:   •  fluticasone-salmeterol (Advair HFA) 115-21 MCG/ACT inhaler, Inhale 2 puffs 2 (Two) Times a Day., Disp: 8 g, Rfl: 11    Allergies:   Allergies   Allergen Reactions   • Shellfish Allergy Hives     oysters   • Poison Ivy Extract [Poison Ivy Extract] Rash       Objective   Objective     Physical Exam:  Vital Signs:   Vitals:    12/19/22 1254   BP: 122/78   BP Location: Left arm   Patient Position: Sitting   Cuff Size: Adult   Pulse: 76   Temp: 97.8 °F (36.6 °C)   TempSrc: Infrared   SpO2: 97%   Weight: 61.9 kg (136 lb 6.4 oz)     Body mass index is 22.7 kg/m².     Physical Exam  Const: NAD, A & Ox4, Pleasant, Cooperative  Eyes: EOMI, no conjunctivitis  ENT: No nasal discharge present, neck supple  Cardiac: Regular rate and rhythm, no cyanosis  Resp: Respiratory rate within normal  limits, no increased work of breathing, no audible wheezing or retractions noted  GI: No distention or ascites  MSK: Motor and sensation grossly intact in bilateral upper extremities  Neurologic: CN II-XII grossly intact  Psych: Appropriate mood and behavior.  Skin: Warm, dry  Procedures/Radiology     Procedures  No radiology results for the last 7 days     Assessment & Plan   Assessment / Plan      Assessment/Plan:   Problems Addressed This Visit  Diagnoses and all orders for this visit:    1. Plantar fascial fibromatosis of left foot (Primary)  -     Ambulatory Referral to Podiatry      Problem List Items Addressed This Visit    None  Visit Diagnoses     Plantar fascial fibromatosis of left foot    -  Primary          1. Plantar fascial fibroma.  - I counseled on diagnosis including relevant anatomy and expected clinical course. I recommended stretching, shoe inserts. She has bilateral fairly severe bunions and I recommend a referral to a podiatrist. I would like her to see Dr. Chase Baker if possible. She can also follow up with her current podiatrist, Dr. Morel if desired.    There are no Patient Instructions on file for this visit.    Follow Up:   Return if symptoms worsen or fail to improve.    DO [address]  [facility]  [address] [facility] [address]  Fax [phone]  Phone [phone]       Transcribed from ambient dictation for Stepan Ragland DO by Scott Bruno.  12/19/22   13:50 EST    Patient or patient representative verbalized consent to the visit recording.  I have personally performed the services described in this document as transcribed by the above individual, and it is both accurate and complete.

## 2023-01-12 ENCOUNTER — OFFICE VISIT (OUTPATIENT)
Dept: FAMILY MEDICINE CLINIC | Facility: CLINIC | Age: 73
End: 2023-01-12
Payer: MEDICARE

## 2023-01-12 ENCOUNTER — TELEPHONE (OUTPATIENT)
Dept: FAMILY MEDICINE CLINIC | Facility: CLINIC | Age: 73
End: 2023-01-12

## 2023-01-12 VITALS
HEART RATE: 70 BPM | DIASTOLIC BLOOD PRESSURE: 70 MMHG | SYSTOLIC BLOOD PRESSURE: 108 MMHG | WEIGHT: 138.4 LBS | TEMPERATURE: 98.1 F | BODY MASS INDEX: 23.03 KG/M2 | OXYGEN SATURATION: 97 %

## 2023-01-12 DIAGNOSIS — M72.0 PALMAR FASCIAL FIBROMATOSIS: ICD-10-CM

## 2023-01-12 DIAGNOSIS — J06.9 ACUTE URI: Primary | ICD-10-CM

## 2023-01-12 DIAGNOSIS — J45.20 MILD INTERMITTENT ASTHMA WITHOUT COMPLICATION: ICD-10-CM

## 2023-01-12 DIAGNOSIS — M65.9 FLEXOR TENOSYNOVITIS OF FINGER: ICD-10-CM

## 2023-01-12 PROCEDURE — 99213 OFFICE O/P EST LOW 20 MIN: CPT | Performed by: FAMILY MEDICINE

## 2023-01-12 RX ORDER — ALBUTEROL SULFATE 90 UG/1
2 AEROSOL, METERED RESPIRATORY (INHALATION) EVERY 4 HOURS PRN
Qty: 18 G | Refills: 1 | Status: SHIPPED | OUTPATIENT
Start: 2023-01-12

## 2023-01-12 RX ORDER — BROMPHENIRAMINE MALEATE, PSEUDOEPHEDRINE HYDROCHLORIDE, AND DEXTROMETHORPHAN HYDROBROMIDE 2; 30; 10 MG/5ML; MG/5ML; MG/5ML
5 SYRUP ORAL 4 TIMES DAILY PRN
Qty: 118 ML | Refills: 1 | Status: SHIPPED | OUTPATIENT
Start: 2023-01-12 | End: 2023-01-19

## 2023-01-12 RX ORDER — FLUTICASONE PROPIONATE AND SALMETEROL XINAFOATE 230; 21 UG/1; UG/1
2 AEROSOL, METERED RESPIRATORY (INHALATION)
Qty: 8 G | Refills: 11 | Status: SHIPPED | OUTPATIENT
Start: 2023-01-12 | End: 2023-01-25

## 2023-01-12 RX ORDER — ALBUTEROL SULFATE 90 UG/1
1 AEROSOL, METERED RESPIRATORY (INHALATION) DAILY PRN
Qty: 8.5 G | Refills: 2 | Status: SHIPPED | OUTPATIENT
Start: 2023-01-12 | End: 2023-01-25 | Stop reason: SDUPTHER

## 2023-01-12 NOTE — PROGRESS NOTES
"Established Patient Office Visit      Patient Name: Tatyana Willett  : 1950   MRN: [id number]   Care Team: Patient Care Team:  Stepan Ragland DO as PCP - General (Family Medicine)  Geeta Dickinson MD as Consulting Physician (Dermatology)    Chief Complaint:    Chief Complaint   Patient presents with   • Follow-up     Pt co upper respiratory problem (cough and nasal drainage) since 12/15.  Pt also continues to be painful       History of Present Illness: Tatyana Willett is a 72 y.o. female who is here today for chief complaint.    HPI    The patient reports that she has 2 issues, her finger and an upper respiratory infection.     She states that her finger is much better than it was at her last visit. She reports that she has full range of motion on her right hand. She reports that she has been working on range of motion, but she cannot get it straight. She reports that it is painful to do that. She reports that she feels like she needs something to help that along.    She reports that on 12/15/2022, she had a sore throat and the next day she was really tired. She reports that her temperature was 99.6 degrees Fahrenheit. She reports that she never had a fever. She reports that the next day, the sore throat got totally better, but she started having an upper respiratory, a little postnasal drip. She reports that her right ear has been stopped up. She reports that she has a dry cough. She notes that she does her normal daily activity, but she got a stationary bike for the grafter and if she gets on that, she cannot get a deep breath. She reports that her asthma is \"keyed up.\" She reports that she had a live the grafter tree, and when she would go in that room, she could tell that things were aggravated and she wore a mask to take everything down. She denies having a fever.    She reports that she is not using the albuterol inhaler at home. She reports that her insurance is not that great because " she does not normally take many medications. She reports that she called her  to see what she could do to increase her insurance, so they would pay more for the albuterol, but it is over 400 dollars. She reports that she feels wheezy only when she cannot get a full breath. She reports that she has been using her Flonase, and she can feel a little drip in the back of her throat. She reports that she has been taking her Claritin, but she did not take the Claritin because she thought it was drying her up too much. She reports that she is taking her Singulair every day. She reports that she has used Advair in the past, and it did well.    She reports that she and Juvenal went last fall to the Shingrix vaccine, and it was almost 300 dollars together. She reports that she decided to wait until 01/2023 and once we passed our deductibles it probably would be covered.    She reports that she can bend her finger all the way, but it is painful. She denies that it is triggering. She reports that the only time she noticed it was one day when she was trying to do exercises, although when she hit a bad finger, it got caught. She reports that she can finally get it straight, but it is painful. She reports that she would like to do physical therapy.    She reports that her right ear is bothering her. She reports that she cannot hear out of it. She reports that she is using Flonase. She adds that she is not taking any decongestants.    The following portions of the patient's history were reviewed and updated as appropriate: allergies, current medications, past family history, past medical history, past social history, past surgical history and problem list.    Subjective      Review of Systems:   Review of Systems - See HPI    Past Medical History:   Past Medical History:   Diagnosis Date   • Allergic    • Allergic rhinitis Lifelong    Moderately severe with nasal polyps   • Asthma 2011    Severe flare with bronchoscopy  from pine needle exposure   • CTS (carpal tunnel syndrome) 2004    Bilateral   • Eosinophilic esophagitis 2018    Biopsy-positive   • GERD (gastroesophageal reflux disease) 2016   • Lumbar scoliosis 2013    Recurrent discomfort   • Lumbar spinal stenosis 2013   • Migraine 2004    hospitalized   • Mononucleosis    • Osteoarthritis of hands, bilateral    • Post menopausal syndrome     Hot flashes with impaired sleep   • Sleep disorder    • Varicose vein of leg     venous ligation left leg       Past Surgical History:   Past Surgical History:   Procedure Laterality Date   • BRONCHOSCOPY      Severe allergic pneumonitis   • COLONOSCOPY      Benign study   • FRACTURE SURGERY  2009    left pinkie finger   • LARYNGOSCOPY  2017    Indirect exam   • SKIN SURGERY      BASIL CELL   • SQUAMOUS CELL CARCINOMA EXCISION Left     L leg   • TONSILLECTOMY     • TONSILLECTOMY     • VEIN SURGERY Left     Ligation of left leg       Family History:   Family History   Problem Relation Age of Onset   • Dementia Mother    • Hearing loss Mother    • Osteoarthritis Mother    • Esophageal cancer Father           age 60   • Cancer Father         Keller's esophagus   • Achalasia Brother    • Osteoarthritis Brother    • Pancreatic cancer Brother    • Pancreatic cancer Maternal Grandmother    • Cancer Maternal Grandmother         Pancreatic   • Heart failure Paternal Grandmother    • Heart attack Paternal Grandfather          age 65   • Osteoarthritis Brother         Bilateral hip replacements   • Breast cancer Neg Hx    • Ovarian cancer Neg Hx        Social History:   Social History     Socioeconomic History   • Marital status:    Tobacco Use   • Smoking status: Never   • Smokeless tobacco: Never   Vaping Use   • Vaping Use: Never used   Substance and Sexual Activity   • Alcohol use: No   • Drug use: No   • Sexual activity: Yes     Partners: Male       Tobacco History:   Social History     Tobacco Use    Smoking Status Never   Smokeless Tobacco Never       Medications:     Current Outpatient Medications:   •  albuterol sulfate HFA (ProAir HFA) 108 (90 Base) MCG/ACT inhaler, Inhale 1 puff Daily As Needed for Shortness of Air., Disp: 8.5 g, Rfl: 2  •  Calcium Carbonate-Vitamin D (CALTRATE 600+D PO), Take 1 tablet by mouth Daily., Disp: , Rfl:   •  fluticasone (FLONASE) 50 MCG/ACT nasal spray, 1 spray into each nostril Every Morning., Disp: , Rfl:   •  Glucosamine-Chondroit-Vit C-Mn (GLUCOSAMINE 1500 COMPLEX PO), Take 1 tablet by mouth daily., Disp: , Rfl:   •  loratadine (CLARITIN) 10 MG tablet, Take 1 tablet by mouth daily as needed., Disp: , Rfl:   •  montelukast (SINGULAIR) 10 MG tablet, TAKE ONE TABLET BY MOUTH DAILY AS NEEDED, Disp: 90 tablet, Rfl: 1  •  Multiple Vitamins-Minerals (WOMENS ONE DAILY) tablet, Take 1 tablet by mouth daily., Disp: , Rfl:   •  vitamin C (ASCORBIC ACID) 500 MG tablet, Take 1 tablet by mouth daily., Disp: , Rfl:   •  Vitamin D, Cholecalciferol, 25 MCG (1000 UT) capsule, Take 1,000 Units by mouth Daily., Disp: , Rfl:   •  albuterol sulfate  (90 Base) MCG/ACT inhaler, Inhale 2 puffs Every 4 (Four) Hours As Needed for Wheezing or Shortness of Air., Disp: 18 g, Rfl: 1  •  brompheniramine-pseudoephedrine-DM 30-2-10 MG/5ML syrup, Take 5 mL by mouth 4 (Four) Times a Day As Needed for Allergies (mucous) for up to 7 days., Disp: 118 mL, Rfl: 1  •  fluticasone-salmeterol (Advair HFA) 230-21 MCG/ACT inhaler, Inhale 2 puffs 2 (Two) Times a Day., Disp: 8 g, Rfl: 11    Allergies:   Allergies   Allergen Reactions   • Shellfish Allergy Hives     oysters   • Poison Ivy Extract [Poison Ivy Extract] Rash       Objective   Objective     Physical Exam:  Vital Signs:   Vitals:    01/12/23 1315   BP: 108/70   BP Location: Left arm   Patient Position: Sitting   Cuff Size: Adult   Pulse: 70   Temp: 98.1 °F (36.7 °C)   TempSrc: Infrared   SpO2: 97%   Weight: 62.8 kg (138 lb 6.4 oz)     Body mass index  is 23.03 kg/m².     Physical Exam  Const: NAD, A & Ox4, Pleasant, Cooperative  Eyes: EOMI, no conjunctivitis  ENT: No nasal discharge present, neck supple  Cardiac: Regular rate and rhythm, no cyanosis  Resp: Respiratory rate within normal limits, no increased work of breathing, no audible wheezing or retractions noted  GI: No distention or ascites  MSK: Motor and sensation grossly intact in bilateral upper extremities  Neurologic: CN II-XII grossly intact  Psych: Appropriate mood and behavior.  Skin: Warm, dry  Procedures/Radiology     Procedures  No radiology results for the last 7 days     Assessment & Plan   Assessment / Plan      Assessment/Plan:   Problems Addressed This Visit  Diagnoses and all orders for this visit:    1. Acute URI (Primary)  -     brompheniramine-pseudoephedrine-DM 30-2-10 MG/5ML syrup; Take 5 mL by mouth 4 (Four) Times a Day As Needed for Allergies (mucous) for up to 7 days.  Dispense: 118 mL; Refill: 1    2. Flexor tenosynovitis of finger  -     Ambulatory Referral to Hand Surgery    3. Mild intermittent asthma without complication  -     albuterol sulfate HFA (ProAir HFA) 108 (90 Base) MCG/ACT inhaler; Inhale 1 puff Daily As Needed for Shortness of Air.  Dispense: 8.5 g; Refill: 2  -     fluticasone-salmeterol (Advair HFA) 230-21 MCG/ACT inhaler; Inhale 2 puffs 2 (Two) Times a Day.  Dispense: 8 g; Refill: 11  -     albuterol sulfate  (90 Base) MCG/ACT inhaler; Inhale 2 puffs Every 4 (Four) Hours As Needed for Wheezing or Shortness of Air.  Dispense: 18 g; Refill: 1    4. Palmar fascial fibromatosis  -     Ambulatory Referral to Hand Surgery      Problem List Items Addressed This Visit        Pulmonary and Pneumonias    Asthma    Overview     Impression: 06/14/2014 - 2010 2000 severe flare with eventual bronchoscopy;          Relevant Medications    albuterol sulfate HFA (ProAir HFA) 108 (90 Base) MCG/ACT inhaler    fluticasone-salmeterol (Advair HFA) 230-21 MCG/ACT inhaler     albuterol sulfate  (90 Base) MCG/ACT inhaler   Other Visit Diagnoses     Acute URI    -  Primary    Relevant Medications    brompheniramine-pseudoephedrine-DM 30-2-10 MG/5ML syrup    Flexor tenosynovitis of finger        Relevant Orders    Ambulatory Referral to Hand Surgery (Completed)    Palmar fascial fibromatosis        Relevant Orders    Ambulatory Referral to Hand Surgery (Completed)          1. Dupuytren's contracture, right hand.  - A referral to hand surgery was placed today. She would benefit from physical therapy.    2. Asthma.  - I will change her Symbicort to advair for cost.    3. Right ear pain.  - I will send in a prescription for Bromfed.      There are no Patient Instructions on file for this visit.    Follow Up:   No follow-ups on file.    DO GREGG Mast RD  [facility]  [address] [address]  Fax [phone]  Phone [phone]       Transcribed from ambient dictation for Stepan Ragland DO by Sandrine Dobson.  01/12/23   14:08 EST    Patient or patient representative verbalized consent to the visit recording.  I have personally performed the services described in this document as transcribed by the above individual, and it is both accurate and complete.

## 2023-01-12 NOTE — TELEPHONE ENCOUNTER
Caller: Brennon Tatyana G    Relationship: Self    Best call back number: [phone]    What medications are you currently taking:   Current Outpatient Medications on File Prior to Visit   Medication Sig Dispense Refill   • albuterol sulfate HFA (ProAir HFA) 108 (90 Base) MCG/ACT inhaler Inhale 1 puff Daily As Needed for Shortness of Air. 8.5 g 2   • albuterol sulfate  (90 Base) MCG/ACT inhaler Inhale 2 puffs Every 4 (Four) Hours As Needed for Wheezing or Shortness of Air. 18 g 1   • brompheniramine-pseudoephedrine-DM 30-2-10 MG/5ML syrup Take 5 mL by mouth 4 (Four) Times a Day As Needed for Allergies (mucous) for up to 7 days. 118 mL 1   • Calcium Carbonate-Vitamin D (CALTRATE 600+D PO) Take 1 tablet by mouth Daily.     • fluticasone (FLONASE) 50 MCG/ACT nasal spray 1 spray into each nostril Every Morning.     • fluticasone-salmeterol (Advair HFA) 230-21 MCG/ACT inhaler Inhale 2 puffs 2 (Two) Times a Day. 8 g 11   • Glucosamine-Chondroit-Vit C-Mn (GLUCOSAMINE 1500 COMPLEX PO) Take 1 tablet by mouth daily.     • loratadine (CLARITIN) 10 MG tablet Take 1 tablet by mouth daily as needed.     • montelukast (SINGULAIR) 10 MG tablet TAKE ONE TABLET BY MOUTH DAILY AS NEEDED 90 tablet 1   • Multiple Vitamins-Minerals (WOMENS ONE DAILY) tablet Take 1 tablet by mouth daily.     • vitamin C (ASCORBIC ACID) 500 MG tablet Take 1 tablet by mouth daily.     • Vitamin D, Cholecalciferol, 25 MCG (1000 UT) capsule Take 1,000 Units by mouth Daily.     • [DISCONTINUED] albuterol (PROAIR HFA) 108 (90 Base) MCG/ACT inhaler Inhale 1 puff Daily As Needed for Shortness of Air. 8.5 inhaler 5   • [DISCONTINUED] fluticasone-salmeterol (Advair HFA) 115-21 MCG/ACT inhaler Inhale 2 puffs 2 (Two) Times a Day. 8 g 11     No current facility-administered medications on file prior to visit.          When did you start taking these medications:     Which medication are you concerned about: fluticasone-salmeterol (Advair HFA) 361-05  MCG/ACT inhaler    Who prescribed you this medication:     What are your concerns: PATIENT CAME IN FOR AN APPOINTMENT TODAY AND DR. REYNAGA PRESCRIBED THIS MEDICATION AND SAID IT WAS GOING TO BE AROUND 100 DOLLARS BUT PATIENT WENT TO THE PHARMACY AND THEY TOLD HER IT WOULD  DOLLARS WITH INSURANCE  WITHOUT INSURANCE SO SHE WOULD LIKE A CALL BACK TO DISCUSS WHAT HER OTHER OPTIONS ARE.     How long have you had these concerns:

## 2023-01-15 ENCOUNTER — APPOINTMENT (OUTPATIENT)
Dept: CT IMAGING | Facility: HOSPITAL | Age: 73
DRG: 149 | End: 2023-01-15
Payer: MEDICARE

## 2023-01-15 ENCOUNTER — APPOINTMENT (OUTPATIENT)
Dept: GENERAL RADIOLOGY | Facility: HOSPITAL | Age: 73
DRG: 149 | End: 2023-01-15
Payer: MEDICARE

## 2023-01-15 ENCOUNTER — HOSPITAL ENCOUNTER (INPATIENT)
Facility: HOSPITAL | Age: 73
LOS: 3 days | Discharge: HOME | DRG: 149 | End: 2023-01-18
Attending: EMERGENCY MEDICINE | Admitting: PEDIATRICS
Payer: MEDICARE

## 2023-01-15 DIAGNOSIS — I63.9 ACUTE ISCHEMIC STROKE: Primary | ICD-10-CM

## 2023-01-15 DIAGNOSIS — R42 VERTIGO: ICD-10-CM

## 2023-01-15 DIAGNOSIS — R27.0 ATAXIA: ICD-10-CM

## 2023-01-15 DIAGNOSIS — G47.33 OSA (OBSTRUCTIVE SLEEP APNEA): ICD-10-CM

## 2023-01-15 LAB
ALBUMIN SERPL-MCNC: 4.4 G/DL (ref 3.5–5.2)
ALBUMIN/GLOB SERPL: 1.8 G/DL
ALP SERPL-CCNC: 57 U/L (ref 39–117)
ALT SERPL W P-5'-P-CCNC: 17 U/L (ref 1–33)
ANION GAP SERPL CALCULATED.3IONS-SCNC: 11 MMOL/L (ref 5–15)
AST SERPL-CCNC: 25 U/L (ref 1–32)
BASOPHILS # BLD AUTO: 0.07 10*3/MM3 (ref 0–0.2)
BASOPHILS NFR BLD AUTO: 1.3 % (ref 0–1.5)
BILIRUB SERPL-MCNC: 0.2 MG/DL (ref 0–1.2)
BUN SERPL-MCNC: 18 MG/DL (ref 8–23)
BUN/CREAT SERPL: 21.4 (ref 7–25)
CALCIUM SPEC-SCNC: 9.2 MG/DL (ref 8.6–10.5)
CHLORIDE SERPL-SCNC: 101 MMOL/L (ref 98–107)
CO2 SERPL-SCNC: 26 MMOL/L (ref 22–29)
CREAT SERPL-MCNC: 0.84 MG/DL (ref 0.57–1)
DEPRECATED RDW RBC AUTO: 40.8 FL (ref 37–54)
EGFRCR SERPLBLD CKD-EPI 2021: 73.9 ML/MIN/1.73
EOSINOPHIL # BLD AUTO: 0.42 10*3/MM3 (ref 0–0.4)
EOSINOPHIL NFR BLD AUTO: 7.6 % (ref 0.3–6.2)
ERYTHROCYTE [DISTWIDTH] IN BLOOD BY AUTOMATED COUNT: 12.7 % (ref 12.3–15.4)
GLOBULIN UR ELPH-MCNC: 2.4 GM/DL
GLUCOSE SERPL-MCNC: 104 MG/DL (ref 65–99)
HCT VFR BLD AUTO: 40.3 % (ref 34–46.6)
HGB BLD-MCNC: 13.6 G/DL (ref 12–15.9)
IMM GRANULOCYTES # BLD AUTO: 0.01 10*3/MM3 (ref 0–0.05)
IMM GRANULOCYTES NFR BLD AUTO: 0.2 % (ref 0–0.5)
LYMPHOCYTES # BLD AUTO: 2.37 10*3/MM3 (ref 0.7–3.1)
LYMPHOCYTES NFR BLD AUTO: 42.9 % (ref 19.6–45.3)
MAGNESIUM SERPL-MCNC: 2 MG/DL (ref 1.6–2.4)
MCH RBC QN AUTO: 29.8 PG (ref 26.6–33)
MCHC RBC AUTO-ENTMCNC: 33.7 G/DL (ref 31.5–35.7)
MCV RBC AUTO: 88.2 FL (ref 79–97)
MONOCYTES # BLD AUTO: 0.52 10*3/MM3 (ref 0.1–0.9)
MONOCYTES NFR BLD AUTO: 9.4 % (ref 5–12)
NEUTROPHILS NFR BLD AUTO: 2.14 10*3/MM3 (ref 1.7–7)
NEUTROPHILS NFR BLD AUTO: 38.6 % (ref 42.7–76)
NRBC BLD AUTO-RTO: 0 /100 WBC (ref 0–0.2)
PLATELET # BLD AUTO: 199 10*3/MM3 (ref 140–450)
PMV BLD AUTO: 9.7 FL (ref 6–12)
POTASSIUM SERPL-SCNC: 3.6 MMOL/L (ref 3.5–5.2)
PROT SERPL-MCNC: 6.8 G/DL (ref 6–8.5)
RBC # BLD AUTO: 4.57 10*6/MM3 (ref 3.77–5.28)
SODIUM SERPL-SCNC: 138 MMOL/L (ref 136–145)
TROPONIN T SERPL-MCNC: <0.01 NG/ML (ref 0–0.03)
WBC NRBC COR # BLD: 5.53 10*3/MM3 (ref 3.4–10.8)

## 2023-01-15 PROCEDURE — 85025 COMPLETE CBC W/AUTO DIFF WBC: CPT | Performed by: EMERGENCY MEDICINE

## 2023-01-15 PROCEDURE — 99222 1ST HOSP IP/OBS MODERATE 55: CPT | Performed by: NURSE PRACTITIONER

## 2023-01-15 PROCEDURE — 3E03317 INTRODUCTION OF OTHER THROMBOLYTIC INTO PERIPHERAL VEIN, PERCUTANEOUS APPROACH: ICD-10-PCS | Performed by: PSYCHIATRY & NEUROLOGY

## 2023-01-15 PROCEDURE — 25010000002 ONDANSETRON PER 1 MG

## 2023-01-15 PROCEDURE — 25010000002 LORAZEPAM PER 2 MG

## 2023-01-15 PROCEDURE — 83735 ASSAY OF MAGNESIUM: CPT | Performed by: EMERGENCY MEDICINE

## 2023-01-15 PROCEDURE — 84443 ASSAY THYROID STIM HORMONE: CPT | Performed by: INTERNAL MEDICINE

## 2023-01-15 PROCEDURE — 25010000002 METOCLOPRAMIDE PER 10 MG: Performed by: NURSE PRACTITIONER

## 2023-01-15 PROCEDURE — 0 IOPAMIDOL PER 1 ML: Performed by: EMERGENCY MEDICINE

## 2023-01-15 PROCEDURE — 70496 CT ANGIOGRAPHY HEAD: CPT

## 2023-01-15 PROCEDURE — 70498 CT ANGIOGRAPHY NECK: CPT

## 2023-01-15 PROCEDURE — 84484 ASSAY OF TROPONIN QUANT: CPT | Performed by: EMERGENCY MEDICINE

## 2023-01-15 PROCEDURE — 93005 ELECTROCARDIOGRAM TRACING: CPT | Performed by: EMERGENCY MEDICINE

## 2023-01-15 PROCEDURE — 0042T HC CT CEREBRAL PERFUSION W/WO CONTRAST: CPT

## 2023-01-15 PROCEDURE — 85610 PROTHROMBIN TIME: CPT

## 2023-01-15 PROCEDURE — 80047 BASIC METABLC PNL IONIZED CA: CPT

## 2023-01-15 PROCEDURE — 25010000002 TENECTEPLASE PER 50 MG: Performed by: NURSE PRACTITIONER

## 2023-01-15 PROCEDURE — 99285 EMERGENCY DEPT VISIT HI MDM: CPT

## 2023-01-15 PROCEDURE — 99223 1ST HOSP IP/OBS HIGH 75: CPT | Performed by: INTERNAL MEDICINE

## 2023-01-15 PROCEDURE — 70450 CT HEAD/BRAIN W/O DYE: CPT

## 2023-01-15 PROCEDURE — 85014 HEMATOCRIT: CPT

## 2023-01-15 PROCEDURE — 80053 COMPREHEN METABOLIC PANEL: CPT | Performed by: EMERGENCY MEDICINE

## 2023-01-15 PROCEDURE — 36415 COLL VENOUS BLD VENIPUNCTURE: CPT

## 2023-01-15 PROCEDURE — 71045 X-RAY EXAM CHEST 1 VIEW: CPT

## 2023-01-15 PROCEDURE — 4A03X5D MEASUREMENT OF ARTERIAL FLOW, INTRACRANIAL, EXTERNAL APPROACH: ICD-10-PCS | Performed by: RADIOLOGY

## 2023-01-15 RX ORDER — BUDESONIDE AND FORMOTEROL FUMARATE DIHYDRATE 160; 4.5 UG/1; UG/1
2 AEROSOL RESPIRATORY (INHALATION)
Status: DISCONTINUED | OUTPATIENT
Start: 2023-01-16 | End: 2023-01-18 | Stop reason: HOSPADM

## 2023-01-15 RX ORDER — ONDANSETRON 2 MG/ML
INJECTION INTRAMUSCULAR; INTRAVENOUS
Status: COMPLETED
Start: 2023-01-15 | End: 2023-01-15

## 2023-01-15 RX ORDER — LORAZEPAM 2 MG/ML
0.5 INJECTION INTRAMUSCULAR ONCE
Status: COMPLETED | OUTPATIENT
Start: 2023-01-15 | End: 2023-01-15

## 2023-01-15 RX ORDER — SODIUM CHLORIDE 0.9 % (FLUSH) 0.9 %
10 SYRINGE (ML) INJECTION ONCE
Status: COMPLETED | OUTPATIENT
Start: 2023-01-15 | End: 2023-01-15

## 2023-01-15 RX ORDER — SODIUM CHLORIDE 0.9 % (FLUSH) 0.9 %
10 SYRINGE (ML) INJECTION AS NEEDED
Status: DISCONTINUED | OUTPATIENT
Start: 2023-01-15 | End: 2023-01-18 | Stop reason: HOSPADM

## 2023-01-15 RX ORDER — ONDANSETRON 2 MG/ML
4 INJECTION INTRAMUSCULAR; INTRAVENOUS ONCE
Status: COMPLETED | OUTPATIENT
Start: 2023-01-15 | End: 2023-01-15

## 2023-01-15 RX ORDER — METOCLOPRAMIDE HYDROCHLORIDE 5 MG/ML
10 INJECTION INTRAMUSCULAR; INTRAVENOUS ONCE
Status: COMPLETED | OUTPATIENT
Start: 2023-01-15 | End: 2023-01-15

## 2023-01-15 RX ORDER — LORAZEPAM 2 MG/ML
INJECTION INTRAMUSCULAR
Status: COMPLETED
Start: 2023-01-15 | End: 2023-01-15

## 2023-01-15 RX ORDER — ONDANSETRON 2 MG/ML
4 INJECTION INTRAMUSCULAR; INTRAVENOUS ONCE
Status: DISCONTINUED | OUTPATIENT
Start: 2023-01-15 | End: 2023-01-18

## 2023-01-15 RX ORDER — ALBUTEROL SULFATE 2.5 MG/3ML
2.5 SOLUTION RESPIRATORY (INHALATION) EVERY 4 HOURS PRN
Status: DISCONTINUED | OUTPATIENT
Start: 2023-01-15 | End: 2023-01-18 | Stop reason: HOSPADM

## 2023-01-15 RX ADMIN — ONDANSETRON 4 MG: 2 INJECTION INTRAMUSCULAR; INTRAVENOUS at 23:09

## 2023-01-15 RX ADMIN — LORAZEPAM 0.5 MG: 2 INJECTION INTRAMUSCULAR at 23:10

## 2023-01-15 RX ADMIN — TENECTEPLASE 16 MG: KIT at 23:32

## 2023-01-15 RX ADMIN — Medication 10 ML: at 23:32

## 2023-01-15 RX ADMIN — METOCLOPRAMIDE 10 MG: 5 INJECTION, SOLUTION INTRAMUSCULAR; INTRAVENOUS at 23:55

## 2023-01-15 RX ADMIN — IOPAMIDOL 115 ML: 755 INJECTION, SOLUTION INTRAVENOUS at 23:10

## 2023-01-15 RX ADMIN — LORAZEPAM 0.5 MG: 2 INJECTION INTRAMUSCULAR; INTRAVENOUS at 23:10

## 2023-01-16 ENCOUNTER — APPOINTMENT (OUTPATIENT)
Dept: CARDIOLOGY | Facility: HOSPITAL | Age: 73
DRG: 149 | End: 2023-01-16
Payer: MEDICARE

## 2023-01-16 ENCOUNTER — APPOINTMENT (OUTPATIENT)
Dept: MRI IMAGING | Facility: HOSPITAL | Age: 73
DRG: 149 | End: 2023-01-16
Payer: MEDICARE

## 2023-01-16 ENCOUNTER — APPOINTMENT (OUTPATIENT)
Dept: CT IMAGING | Facility: HOSPITAL | Age: 73
DRG: 149 | End: 2023-01-16
Payer: MEDICARE

## 2023-01-16 LAB
ANION GAP SERPL CALCULATED.3IONS-SCNC: 7 MMOL/L (ref 5–15)
BACTERIA UR QL AUTO: NORMAL /HPF
BASOPHILS # BLD AUTO: 0.04 10*3/MM3 (ref 0–0.2)
BASOPHILS NFR BLD AUTO: 0.7 % (ref 0–1.5)
BH CV ECHO MEAS - AO MAX PG: 8.3 MMHG
BH CV ECHO MEAS - AO MEAN PG: 4.5 MMHG
BH CV ECHO MEAS - AO ROOT DIAM: 3.1 CM
BH CV ECHO MEAS - AO V2 MAX: 143.5 CM/SEC
BH CV ECHO MEAS - AO V2 VTI: 32.2 CM
BH CV ECHO MEAS - AVA(I,D): 2.24 CM2
BH CV ECHO MEAS - EDV(CUBED): 46.7 ML
BH CV ECHO MEAS - EDV(MOD-SP2): 72 ML
BH CV ECHO MEAS - EDV(MOD-SP4): 59.3 ML
BH CV ECHO MEAS - EF(MOD-BP): 68 %
BH CV ECHO MEAS - EF(MOD-SP2): 79.6 %
BH CV ECHO MEAS - EF(MOD-SP4): 67.1 %
BH CV ECHO MEAS - ESV(CUBED): 9.3 ML
BH CV ECHO MEAS - ESV(MOD-SP2): 14.7 ML
BH CV ECHO MEAS - ESV(MOD-SP4): 19.5 ML
BH CV ECHO MEAS - FS: 41.7 %
BH CV ECHO MEAS - IVS/LVPW: 1 CM
BH CV ECHO MEAS - IVSD: 1 CM
BH CV ECHO MEAS - LA DIMENSION: 2.7 CM
BH CV ECHO MEAS - LAT PEAK E' VEL: 11.2 CM/SEC
BH CV ECHO MEAS - LV MASS(C)D: 107.9 GRAMS
BH CV ECHO MEAS - LV MAX PG: 4.1 MMHG
BH CV ECHO MEAS - LV MEAN PG: 2 MMHG
BH CV ECHO MEAS - LV V1 MAX: 101 CM/SEC
BH CV ECHO MEAS - LV V1 VTI: 23 CM
BH CV ECHO MEAS - LVIDD: 3.6 CM
BH CV ECHO MEAS - LVIDS: 2.1 CM
BH CV ECHO MEAS - LVOT AREA: 3.1 CM2
BH CV ECHO MEAS - LVOT DIAM: 2 CM
BH CV ECHO MEAS - LVPWD: 1 CM
BH CV ECHO MEAS - MED PEAK E' VEL: 12.3 CM/SEC
BH CV ECHO MEAS - MV A MAX VEL: 79.1 CM/SEC
BH CV ECHO MEAS - MV DEC SLOPE: 305 CM/SEC2
BH CV ECHO MEAS - MV DEC TIME: 0.19 MSEC
BH CV ECHO MEAS - MV E MAX VEL: 106 CM/SEC
BH CV ECHO MEAS - MV E/A: 1.34
BH CV ECHO MEAS - MV P1/2T: 98.9 MSEC
BH CV ECHO MEAS - MVA(P1/2T): 2.22 CM2
BH CV ECHO MEAS - PA ACC TIME: 0.18 SEC
BH CV ECHO MEAS - PA PR(ACCEL): -2 MMHG
BH CV ECHO MEAS - PA V2 MAX: 103 CM/SEC
BH CV ECHO MEAS - PI END-D VEL: 65.3 CM/SEC
BH CV ECHO MEAS - RAP SYSTOLE: 8 MMHG
BH CV ECHO MEAS - RVSP: 31 MMHG
BH CV ECHO MEAS - SV(LVOT): 72.1 ML
BH CV ECHO MEAS - SV(MOD-SP2): 57.3 ML
BH CV ECHO MEAS - SV(MOD-SP4): 39.8 ML
BH CV ECHO MEAS - TAPSE (>1.6): 2.6 CM
BH CV ECHO MEAS - TR MAX PG: 23 MMHG
BH CV ECHO MEAS - TR MAX VEL: 215.3 CM/SEC
BH CV ECHO MEASUREMENTS AVERAGE E/E' RATIO: 9.02
BH CV ECHO SHUNT ASSESSMENT PERFORMED (HIDDEN SCRIPTING): 1
BH CV XLRA - RV BASE: 2.6 CM
BH CV XLRA - RV LENGTH: 7.7 CM
BH CV XLRA - RV MID: 2.1 CM
BH CV XLRA - TDI S': 13.4 CM/SEC
BILIRUB UR QL STRIP: NEGATIVE
BUN SERPL-MCNC: 15 MG/DL (ref 8–23)
BUN/CREAT SERPL: 22.7 (ref 7–25)
CALCIUM SPEC-SCNC: 8.7 MG/DL (ref 8.6–10.5)
CHLORIDE SERPL-SCNC: 103 MMOL/L (ref 98–107)
CHOLEST SERPL-MCNC: 169 MG/DL (ref 0–200)
CLARITY UR: ABNORMAL
CO2 SERPL-SCNC: 29 MMOL/L (ref 22–29)
COLOR UR: YELLOW
CREAT SERPL-MCNC: 0.66 MG/DL (ref 0.57–1)
DEPRECATED RDW RBC AUTO: 40.8 FL (ref 37–54)
EGFRCR SERPLBLD CKD-EPI 2021: 93.3 ML/MIN/1.73
EOSINOPHIL # BLD AUTO: 0.11 10*3/MM3 (ref 0–0.4)
EOSINOPHIL NFR BLD AUTO: 1.9 % (ref 0.3–6.2)
ERYTHROCYTE [DISTWIDTH] IN BLOOD BY AUTOMATED COUNT: 12.6 % (ref 12.3–15.4)
GLUCOSE BLDC GLUCOMTR-MCNC: 126 MG/DL (ref 70–130)
GLUCOSE SERPL-MCNC: 108 MG/DL (ref 65–99)
GLUCOSE UR STRIP-MCNC: NEGATIVE MG/DL
HBA1C MFR BLD: 4.9 % (ref 4.8–5.6)
HCT VFR BLD AUTO: 37.3 % (ref 34–46.6)
HDLC SERPL-MCNC: 63 MG/DL (ref 40–60)
HGB BLD-MCNC: 12.3 G/DL (ref 12–15.9)
HGB UR QL STRIP.AUTO: NEGATIVE
HOLD SPECIMEN: NORMAL
HYALINE CASTS UR QL AUTO: NORMAL /LPF
IMM GRANULOCYTES # BLD AUTO: 0.01 10*3/MM3 (ref 0–0.05)
IMM GRANULOCYTES NFR BLD AUTO: 0.2 % (ref 0–0.5)
KETONES UR QL STRIP: NEGATIVE
LDLC SERPL CALC-MCNC: 100 MG/DL (ref 0–100)
LDLC/HDLC SERPL: 1.6 {RATIO}
LEFT ATRIUM VOLUME INDEX: 25.3 ML/M2
LEUKOCYTE ESTERASE UR QL STRIP.AUTO: NEGATIVE
LYMPHOCYTES # BLD AUTO: 1.13 10*3/MM3 (ref 0.7–3.1)
LYMPHOCYTES NFR BLD AUTO: 19.8 % (ref 19.6–45.3)
MAXIMAL PREDICTED HEART RATE: 148 BPM
MCH RBC QN AUTO: 29.3 PG (ref 26.6–33)
MCHC RBC AUTO-ENTMCNC: 33 G/DL (ref 31.5–35.7)
MCV RBC AUTO: 88.8 FL (ref 79–97)
MONOCYTES # BLD AUTO: 0.35 10*3/MM3 (ref 0.1–0.9)
MONOCYTES NFR BLD AUTO: 6.1 % (ref 5–12)
NEUTROPHILS NFR BLD AUTO: 4.07 10*3/MM3 (ref 1.7–7)
NEUTROPHILS NFR BLD AUTO: 71.3 % (ref 42.7–76)
NITRITE UR QL STRIP: NEGATIVE
NRBC BLD AUTO-RTO: 0 /100 WBC (ref 0–0.2)
PH UR STRIP.AUTO: 8 [PH] (ref 5–8)
PLATELET # BLD AUTO: 176 10*3/MM3 (ref 140–450)
PMV BLD AUTO: 9.8 FL (ref 6–12)
POTASSIUM SERPL-SCNC: 4.1 MMOL/L (ref 3.5–5.2)
PROT UR QL STRIP: NEGATIVE
QT INTERVAL: 450 MS
QTC INTERVAL: 430 MS
RBC # BLD AUTO: 4.2 10*6/MM3 (ref 3.77–5.28)
RBC # UR STRIP: NORMAL /HPF
REF LAB TEST METHOD: NORMAL
SODIUM SERPL-SCNC: 139 MMOL/L (ref 136–145)
SP GR UR STRIP: 1.05 (ref 1–1.03)
SQUAMOUS #/AREA URNS HPF: NORMAL /HPF
STRESS TARGET HR: 126 BPM
TRIGL SERPL-MCNC: 26 MG/DL (ref 0–150)
TSH SERPL DL<=0.05 MIU/L-ACNC: 7.01 UIU/ML (ref 0.27–4.2)
UROBILINOGEN UR QL STRIP: ABNORMAL
VLDLC SERPL-MCNC: 6 MG/DL (ref 5–40)
WBC # UR STRIP: NORMAL /HPF
WBC NRBC COR # BLD: 5.71 10*3/MM3 (ref 3.4–10.8)
WHOLE BLOOD HOLD COAG: NORMAL
WHOLE BLOOD HOLD SPECIMEN: NORMAL

## 2023-01-16 PROCEDURE — 99232 SBSQ HOSP IP/OBS MODERATE 35: CPT | Performed by: INTERNAL MEDICINE

## 2023-01-16 PROCEDURE — 81001 URINALYSIS AUTO W/SCOPE: CPT | Performed by: EMERGENCY MEDICINE

## 2023-01-16 PROCEDURE — 93306 TTE W/DOPPLER COMPLETE: CPT | Performed by: INTERNAL MEDICINE

## 2023-01-16 PROCEDURE — 80061 LIPID PANEL: CPT | Performed by: INTERNAL MEDICINE

## 2023-01-16 PROCEDURE — 70551 MRI BRAIN STEM W/O DYE: CPT

## 2023-01-16 PROCEDURE — 80048 BASIC METABOLIC PNL TOTAL CA: CPT | Performed by: INTERNAL MEDICINE

## 2023-01-16 PROCEDURE — 83036 HEMOGLOBIN GLYCOSYLATED A1C: CPT | Performed by: INTERNAL MEDICINE

## 2023-01-16 PROCEDURE — 93306 TTE W/DOPPLER COMPLETE: CPT

## 2023-01-16 PROCEDURE — 82962 GLUCOSE BLOOD TEST: CPT

## 2023-01-16 PROCEDURE — 85025 COMPLETE CBC W/AUTO DIFF WBC: CPT | Performed by: INTERNAL MEDICINE

## 2023-01-16 PROCEDURE — 92523 SPEECH SOUND LANG COMPREHEN: CPT

## 2023-01-16 PROCEDURE — 99232 SBSQ HOSP IP/OBS MODERATE 35: CPT | Performed by: PSYCHIATRY & NEUROLOGY

## 2023-01-16 PROCEDURE — 70450 CT HEAD/BRAIN W/O DYE: CPT

## 2023-01-16 RX ORDER — ASPIRIN 300 MG/1
300 SUPPOSITORY RECTAL DAILY
Status: DISCONTINUED | OUTPATIENT
Start: 2023-01-16 | End: 2023-01-17

## 2023-01-16 RX ORDER — SODIUM CHLORIDE 0.9 % (FLUSH) 0.9 %
10 SYRINGE (ML) INJECTION EVERY 12 HOURS SCHEDULED
Status: DISCONTINUED | OUTPATIENT
Start: 2023-01-16 | End: 2023-01-18 | Stop reason: HOSPADM

## 2023-01-16 RX ORDER — MECLIZINE HCL 12.5 MG/1
25 TABLET ORAL 2 TIMES DAILY PRN
Status: DISCONTINUED | OUTPATIENT
Start: 2023-01-16 | End: 2023-01-18 | Stop reason: HOSPADM

## 2023-01-16 RX ORDER — ATORVASTATIN CALCIUM 40 MG/1
80 TABLET, FILM COATED ORAL NIGHTLY
Status: DISCONTINUED | OUTPATIENT
Start: 2023-01-16 | End: 2023-01-17

## 2023-01-16 RX ORDER — SODIUM CHLORIDE 0.9 % (FLUSH) 0.9 %
10 SYRINGE (ML) INJECTION AS NEEDED
Status: DISCONTINUED | OUTPATIENT
Start: 2023-01-15 | End: 2023-01-18

## 2023-01-16 RX ORDER — SODIUM CHLORIDE 9 MG/ML
40 INJECTION, SOLUTION INTRAVENOUS AS NEEDED
Status: DISCONTINUED | OUTPATIENT
Start: 2023-01-15 | End: 2023-01-18 | Stop reason: HOSPADM

## 2023-01-16 RX ORDER — ASPIRIN 325 MG
325 TABLET ORAL DAILY
Status: DISCONTINUED | OUTPATIENT
Start: 2023-01-16 | End: 2023-01-17

## 2023-01-16 RX ADMIN — Medication 10 ML: at 21:06

## 2023-01-17 ENCOUNTER — APPOINTMENT (OUTPATIENT)
Dept: CARDIOLOGY | Facility: HOSPITAL | Age: 73
DRG: 149 | End: 2023-01-17
Payer: MEDICARE

## 2023-01-17 LAB
BH CV LOWER VASCULAR LEFT COMMON FEMORAL AUGMENT: NORMAL
BH CV LOWER VASCULAR LEFT COMMON FEMORAL COMPETENT: NORMAL
BH CV LOWER VASCULAR LEFT COMMON FEMORAL COMPRESS: NORMAL
BH CV LOWER VASCULAR LEFT COMMON FEMORAL PHASIC: NORMAL
BH CV LOWER VASCULAR LEFT COMMON FEMORAL SPONT: NORMAL
BH CV LOWER VASCULAR LEFT DISTAL FEMORAL COMPRESS: NORMAL
BH CV LOWER VASCULAR LEFT GASTRONEMIUS COMPRESS: NORMAL
BH CV LOWER VASCULAR LEFT GREATER SAPH AK COMPRESS: NORMAL
BH CV LOWER VASCULAR LEFT GREATER SAPH BK COMPRESS: NORMAL
BH CV LOWER VASCULAR LEFT LESSER SAPH COMPRESS: NORMAL
BH CV LOWER VASCULAR LEFT MID FEMORAL AUGMENT: NORMAL
BH CV LOWER VASCULAR LEFT MID FEMORAL COMPETENT: NORMAL
BH CV LOWER VASCULAR LEFT MID FEMORAL COMPRESS: NORMAL
BH CV LOWER VASCULAR LEFT MID FEMORAL PHASIC: NORMAL
BH CV LOWER VASCULAR LEFT MID FEMORAL SPONT: NORMAL
BH CV LOWER VASCULAR LEFT PERONEAL COMPRESS: NORMAL
BH CV LOWER VASCULAR LEFT POPLITEAL AUGMENT: NORMAL
BH CV LOWER VASCULAR LEFT POPLITEAL COMPETENT: NORMAL
BH CV LOWER VASCULAR LEFT POPLITEAL COMPRESS: NORMAL
BH CV LOWER VASCULAR LEFT POPLITEAL PHASIC: NORMAL
BH CV LOWER VASCULAR LEFT POPLITEAL SPONT: NORMAL
BH CV LOWER VASCULAR LEFT POSTERIOR TIBIAL COMPRESS: NORMAL
BH CV LOWER VASCULAR LEFT PROFUNDA FEMORAL COMPRESS: NORMAL
BH CV LOWER VASCULAR LEFT PROXIMAL FEMORAL COMPRESS: NORMAL
BH CV LOWER VASCULAR LEFT SAPHENOFEMORAL JUNCTION COMPRESS: NORMAL
BH CV LOWER VASCULAR RIGHT COMMON FEMORAL AUGMENT: NORMAL
BH CV LOWER VASCULAR RIGHT COMMON FEMORAL COMPETENT: NORMAL
BH CV LOWER VASCULAR RIGHT COMMON FEMORAL COMPRESS: NORMAL
BH CV LOWER VASCULAR RIGHT COMMON FEMORAL PHASIC: NORMAL
BH CV LOWER VASCULAR RIGHT COMMON FEMORAL SPONT: NORMAL
BH CV LOWER VASCULAR RIGHT DISTAL FEMORAL COMPRESS: NORMAL
BH CV LOWER VASCULAR RIGHT GASTRONEMIUS COMPRESS: NORMAL
BH CV LOWER VASCULAR RIGHT GREATER SAPH AK COMPRESS: NORMAL
BH CV LOWER VASCULAR RIGHT GREATER SAPH BK COMPRESS: NORMAL
BH CV LOWER VASCULAR RIGHT LESSER SAPH COMPRESS: NORMAL
BH CV LOWER VASCULAR RIGHT MID FEMORAL AUGMENT: NORMAL
BH CV LOWER VASCULAR RIGHT MID FEMORAL COMPETENT: NORMAL
BH CV LOWER VASCULAR RIGHT MID FEMORAL COMPRESS: NORMAL
BH CV LOWER VASCULAR RIGHT MID FEMORAL PHASIC: NORMAL
BH CV LOWER VASCULAR RIGHT MID FEMORAL SPONT: NORMAL
BH CV LOWER VASCULAR RIGHT PERONEAL COMPRESS: NORMAL
BH CV LOWER VASCULAR RIGHT POPLITEAL AUGMENT: NORMAL
BH CV LOWER VASCULAR RIGHT POPLITEAL COMPETENT: NORMAL
BH CV LOWER VASCULAR RIGHT POPLITEAL COMPRESS: NORMAL
BH CV LOWER VASCULAR RIGHT POPLITEAL PHASIC: NORMAL
BH CV LOWER VASCULAR RIGHT POPLITEAL SPONT: NORMAL
BH CV LOWER VASCULAR RIGHT POSTERIOR TIBIAL COMPRESS: NORMAL
BH CV LOWER VASCULAR RIGHT PROFUNDA FEMORAL COMPRESS: NORMAL
BH CV LOWER VASCULAR RIGHT PROXIMAL FEMORAL COMPRESS: NORMAL
BH CV LOWER VASCULAR RIGHT SAPHENOFEMORAL JUNCTION COMPRESS: NORMAL
GLUCOSE BLDC GLUCOMTR-MCNC: 101 MG/DL (ref 70–130)
GLUCOSE BLDC GLUCOMTR-MCNC: 94 MG/DL (ref 70–130)
MAXIMAL PREDICTED HEART RATE: 148 BPM
STRESS TARGET HR: 126 BPM

## 2023-01-17 PROCEDURE — 82962 GLUCOSE BLOOD TEST: CPT

## 2023-01-17 PROCEDURE — 99232 SBSQ HOSP IP/OBS MODERATE 35: CPT

## 2023-01-17 PROCEDURE — 93970 EXTREMITY STUDY: CPT

## 2023-01-17 PROCEDURE — 99231 SBSQ HOSP IP/OBS SF/LOW 25: CPT | Performed by: NURSE PRACTITIONER

## 2023-01-17 PROCEDURE — 97165 OT EVAL LOW COMPLEX 30 MIN: CPT

## 2023-01-17 PROCEDURE — 97161 PT EVAL LOW COMPLEX 20 MIN: CPT

## 2023-01-17 PROCEDURE — 93970 EXTREMITY STUDY: CPT | Performed by: INTERNAL MEDICINE

## 2023-01-17 RX ORDER — ASPIRIN 81 MG/1
81 TABLET, CHEWABLE ORAL DAILY
Status: DISCONTINUED | OUTPATIENT
Start: 2023-01-17 | End: 2023-01-17

## 2023-01-17 RX ORDER — ATORVASTATIN CALCIUM 40 MG/1
40 TABLET, FILM COATED ORAL NIGHTLY
Status: DISCONTINUED | OUTPATIENT
Start: 2023-01-17 | End: 2023-01-18 | Stop reason: HOSPADM

## 2023-01-17 RX ORDER — ASPIRIN 81 MG/1
81 TABLET, CHEWABLE ORAL DAILY
Status: DISCONTINUED | OUTPATIENT
Start: 2023-01-17 | End: 2023-01-18 | Stop reason: HOSPADM

## 2023-01-17 RX ADMIN — ATORVASTATIN CALCIUM 40 MG: 40 TABLET, FILM COATED ORAL at 20:58

## 2023-01-17 RX ADMIN — ASPIRIN 81 MG CHEWABLE TABLET 81 MG: 81 TABLET CHEWABLE at 11:50

## 2023-01-17 RX ADMIN — Medication 10 ML: at 20:58

## 2023-01-18 ENCOUNTER — READMISSION MANAGEMENT (OUTPATIENT)
Dept: CALL CENTER | Facility: HOSPITAL | Age: 73
End: 2023-01-18
Payer: MEDICARE

## 2023-01-18 VITALS
OXYGEN SATURATION: 95 % | SYSTOLIC BLOOD PRESSURE: 111 MMHG | DIASTOLIC BLOOD PRESSURE: 72 MMHG | HEIGHT: 65 IN | WEIGHT: 138.01 LBS | HEART RATE: 72 BPM | TEMPERATURE: 98 F | RESPIRATION RATE: 18 BRPM | BODY MASS INDEX: 22.99 KG/M2

## 2023-01-18 PROBLEM — R42 VERTIGO: Status: ACTIVE | Noted: 2023-01-18

## 2023-01-18 PROBLEM — I63.9 ACUTE ISCHEMIC STROKE: Status: RESOLVED | Noted: 2023-01-15 | Resolved: 2023-01-18

## 2023-01-18 LAB
ANION GAP SERPL CALCULATED.3IONS-SCNC: 6 MMOL/L (ref 5–15)
BUN SERPL-MCNC: 13 MG/DL (ref 8–23)
BUN/CREAT SERPL: 19.7 (ref 7–25)
CALCIUM SPEC-SCNC: 9.2 MG/DL (ref 8.6–10.5)
CHLORIDE SERPL-SCNC: 108 MMOL/L (ref 98–107)
CO2 SERPL-SCNC: 29 MMOL/L (ref 22–29)
CREAT SERPL-MCNC: 0.66 MG/DL (ref 0.57–1)
DEPRECATED RDW RBC AUTO: 41.1 FL (ref 37–54)
EGFRCR SERPLBLD CKD-EPI 2021: 93.3 ML/MIN/1.73
ERYTHROCYTE [DISTWIDTH] IN BLOOD BY AUTOMATED COUNT: 12.6 % (ref 12.3–15.4)
GLUCOSE SERPL-MCNC: 85 MG/DL (ref 65–99)
HCT VFR BLD AUTO: 41.9 % (ref 34–46.6)
HGB BLD-MCNC: 14 G/DL (ref 12–15.9)
MAGNESIUM SERPL-MCNC: 2.3 MG/DL (ref 1.6–2.4)
MCH RBC QN AUTO: 29.7 PG (ref 26.6–33)
MCHC RBC AUTO-ENTMCNC: 33.4 G/DL (ref 31.5–35.7)
MCV RBC AUTO: 88.8 FL (ref 79–97)
PHOSPHATE SERPL-MCNC: 3.3 MG/DL (ref 2.5–4.5)
PLATELET # BLD AUTO: 203 10*3/MM3 (ref 140–450)
PMV BLD AUTO: 9.9 FL (ref 6–12)
POTASSIUM SERPL-SCNC: 4.6 MMOL/L (ref 3.5–5.2)
RBC # BLD AUTO: 4.72 10*6/MM3 (ref 3.77–5.28)
SODIUM SERPL-SCNC: 143 MMOL/L (ref 136–145)
WBC NRBC COR # BLD: 5.09 10*3/MM3 (ref 3.4–10.8)

## 2023-01-18 PROCEDURE — 99221 1ST HOSP IP/OBS SF/LOW 40: CPT | Performed by: INTERNAL MEDICINE

## 2023-01-18 PROCEDURE — 85027 COMPLETE CBC AUTOMATED: CPT

## 2023-01-18 PROCEDURE — 84100 ASSAY OF PHOSPHORUS: CPT

## 2023-01-18 PROCEDURE — 99239 HOSP IP/OBS DSCHRG MGMT >30: CPT | Performed by: PEDIATRICS

## 2023-01-18 PROCEDURE — 99231 SBSQ HOSP IP/OBS SF/LOW 25: CPT | Performed by: NURSE PRACTITIONER

## 2023-01-18 PROCEDURE — 83735 ASSAY OF MAGNESIUM: CPT

## 2023-01-18 PROCEDURE — 80048 BASIC METABOLIC PNL TOTAL CA: CPT

## 2023-01-18 RX ORDER — ASPIRIN 81 MG/1
81 TABLET, CHEWABLE ORAL DAILY
Qty: 30 TABLET | Refills: 0 | Status: SHIPPED | OUTPATIENT
Start: 2023-01-19

## 2023-01-18 RX ORDER — ALPRAZOLAM 0.25 MG/1
0.25 TABLET ORAL ONCE
Status: DISCONTINUED | OUTPATIENT
Start: 2023-01-18 | End: 2023-01-18 | Stop reason: HOSPADM

## 2023-01-18 RX ORDER — ATORVASTATIN CALCIUM 40 MG/1
40 TABLET, FILM COATED ORAL NIGHTLY
Qty: 90 TABLET | Refills: 0 | Status: SHIPPED | OUTPATIENT
Start: 2023-01-18

## 2023-01-18 RX ADMIN — ASPIRIN 81 MG CHEWABLE TABLET 81 MG: 81 TABLET CHEWABLE at 10:14

## 2023-01-18 RX ADMIN — Medication 10 ML: at 10:14

## 2023-01-18 NOTE — OUTREACH NOTE
Prep Survey    Flowsheet Row Responses   [facility] patient discharged from? [place]   Is LACE score < 7 ? No   Eligibility [facility]   Date of Admission 01/15/23   Date of Discharge 01/18/23   Discharge Disposition Home or Self Care   Discharge diagnosis Acute ischemic stroke    Does the patient have one of the following disease processes/diagnoses(primary or secondary)? Stroke   Does the patient have Home health ordered? No   Is there a DME ordered? No   Prep survey completed? Yes          DAHIANA TRAMMELL - Registered Nurse

## 2023-01-18 NOTE — TELEPHONE ENCOUNTER
Patient returned call, she will check her medical formulary for preferred inhaler when she is discharged. Will discuss with PCP at [facility] appt

## 2023-01-19 ENCOUNTER — TRANSITIONAL CARE MANAGEMENT TELEPHONE ENCOUNTER (OUTPATIENT)
Dept: CALL CENTER | Facility: HOSPITAL | Age: 73
End: 2023-01-19
Payer: MEDICARE

## 2023-01-19 NOTE — OUTREACH NOTE
Call Center TCM Note    Flowsheet Row Responses   [facility] patient discharged from? [place]   Does the patient have one of the following disease processes/diagnoses(primary or secondary)? Stroke   TCM attempt successful? Yes   Call start time 1208   Call end time 1227   Discharge diagnosis Acute ischemic stroke    Person spoke with today (if not patient) and relationship Patient   Meds reviewed with patient/caregiver? Yes   Is the patient having any side effects they believe may be caused by any medication additions or changes? No   Does the patient have all medications ordered at discharge? No   Nursing Interventions No intervention needed   Prescription comments Pt is picking up Lipitor today, 1/19/23 from [facility]   Is the patient taking all medications as directed (includes completed medication regime)? Yes   Comments Needs cardiology fu appt   Does the patient have an appointment with their PCP within 7 days of discharge? Yes  [1/25/23 at 9:45 AM]   Psychosocial issues? No   Does the patient require any assistance with activities of daily living such as eating, bathing, dressing, walking, etc.? No   ADL comments Pt confirmed she is back to her baseline   Does the patient have any residual symptoms from stroke/TIA? No   Does the patient understand the diet ordered at discharge? Yes   Did the patient receive a copy of their discharge instructions? Yes   Nursing interventions Reviewed instructions with patient   What is the patient's perception of their health status since discharge? Improving   Nursing interventions Nurse provided patient education   Is the patient/caregiver able to teach back the risk factors for a stroke? High blood pressure-goal below 120/80, High Cholesterol, Physical inactivity and obesity, Sleep apnea   Is the patient/caregiver able to teach back signs and symptoms related to disease process for when to call PCP? No   Is the patient/caregiver able to teach back signs and  symptoms related to disease process for when to call 911? No   If the patient is a current smoker, are they able to teach back resources for cessation? Not a smoker   Is the patient/caregiver able to teach back the hierarchy of who to call/visit for symptoms/problems? PCP, Specialist, Home health nurse, Urgent Care, ED, 911 Yes   Is the patient able to teach back FAST for Stroke? T jaky: Call 9-1-1 right away, S peech: Listen for slurred speech, A rm: Check if one arm is weak, F ace: Look for an uneven smile, E yes: Check for vision loss, B alance: Watch for sudden loss of balance   TCM call completed? Yes   Wrap up additional comments Pt states she is doing better, and denies any residual s/s of a stroke.  Reviewed AVS/medicaitons with pt. Pt verified PCP fu appt on 1/25/23.  Sent safe report.   Call end time 1227   Would this patient benefit from a Referral to [facility] Social Work? No   Is the patient interested in additional calls from an ambulatory ?  NOTE:  applies to high risk patients requiring additional follow-up. No          Kayli Terrell, JOSELIN    1/19/2023, 12:54 EST

## 2023-01-23 LAB
BUN BLDA-MCNC: 19 MG/DL (ref 8–26)
CA-I BLDA-SCNC: 1.25 MMOL/L (ref 1.2–1.32)
CHLORIDE BLDA-SCNC: 100 MMOL/L (ref 98–109)
CO2 BLDA-SCNC: 27 MMOL/L (ref 24–29)
CREAT BLDA-MCNC: 0.8 MG/DL (ref 0.6–1.3)
EGFRCR SERPLBLD CKD-EPI 2021: 78.4 ML/MIN/1.73
GLUCOSE BLDC GLUCOMTR-MCNC: 100 MG/DL (ref 70–130)
HCT VFR BLDA CALC: 41 % (ref 38–51)
HGB BLDA-MCNC: 13.9 G/DL (ref 12–17)
INR PPP: 1.2 (ref 0.8–1.2)
POTASSIUM BLDA-SCNC: 3.5 MMOL/L (ref 3.5–4.9)
PROTHROMBIN TIME: 14.1 SECONDS (ref 12.8–15.2)
SODIUM BLD-SCNC: 139 MMOL/L (ref 138–146)

## 2023-01-25 ENCOUNTER — OFFICE VISIT (OUTPATIENT)
Dept: FAMILY MEDICINE CLINIC | Facility: CLINIC | Age: 73
End: 2023-01-25
Payer: MEDICARE

## 2023-01-25 VITALS
HEART RATE: 68 BPM | DIASTOLIC BLOOD PRESSURE: 76 MMHG | TEMPERATURE: 97.8 F | OXYGEN SATURATION: 98 % | BODY MASS INDEX: 22.76 KG/M2 | WEIGHT: 136.8 LBS | SYSTOLIC BLOOD PRESSURE: 118 MMHG

## 2023-01-25 DIAGNOSIS — R42 VERTIGO: Primary | ICD-10-CM

## 2023-01-25 DIAGNOSIS — G47.33 OSA (OBSTRUCTIVE SLEEP APNEA): ICD-10-CM

## 2023-01-25 DIAGNOSIS — I35.9 AORTIC VALVE CALCIFICATION: ICD-10-CM

## 2023-01-25 PROCEDURE — 1111F DSCHRG MED/CURRENT MED MERGE: CPT | Performed by: FAMILY MEDICINE

## 2023-01-25 PROCEDURE — 99495 TRANSJ CARE MGMT MOD F2F 14D: CPT | Performed by: FAMILY MEDICINE

## 2023-01-25 NOTE — PROGRESS NOTES
Hospital Follow-Up/Transition of Care Visit     Patient Name: Tatyana Willett  : 1950   MRN: [id number]   Care Team: Patient Care Team:  Stepan Ragland DO as PCP - General (Family Medicine)  Geeta Dickinson MD as Consulting Physician (Dermatology)    Chief Complaint:    Chief Complaint   Patient presents with   • Follow-up   • Dizziness     Pt is following up after hospital discharge (dx: stroke, vertigo, ANNETTA)       History of Present Illness: Tatyana Willett is a 72 y.o. female who presents today for TCM visit.    Within 48 business hours after discharge our office contacted her via telephone to coordinate her care and needs.      I reviewed and discussed the details of that call along with the discharge summary, hospital problems, inpatient lab results, inpatient diagnostic studies, and consultation reports with Tatyana.    Current outpatient and discharge medications have been reconciled for the patient.  Reviewed by: Stepan Ragland DO      Date of TCM Phone Call 2023   [facility]   Date of Admission 1/15/2023   Date of Discharge 2023   Discharge Disposition Home or Self Care     Risk for Readmission (LACE) Score: 9 (2023  6:00 AM)      History of Present Illness   Course During Hospital Stay:    Tatyana Willett is a 72 y.o. female with a past medical history significant for asthma, carpal tunnel syndrome, GERD, migraines, and insomnia, who presented to the [facility] ER with concerns of a possible CVA.  She received TNKase and was admitted to the ICU for further management.  MRI from 2023 showed no signs of an acute CVA.  CT angiogram and CT perfusion showed no large vessel occlusion or perfusion mismatch.  ECHO initially read as normal ejection fraction by with a saline test positive.  Cardiology was consulted and Dr. Crews reviewed the patient's echocardiogram and felt that she did not have an obvious PFO.  She will be discharged  home on an aspirin and statin for stroke prevention.  She will be followed up in cardiology clinic in 3 weeks and in neurology clinic in 1 month.     Discharge Follow Up Recommendations for outpatient labs/diagnostics:   none    Status Since Discharge:  The patient  has been doing well since her hospital visit. She is still experiencing cerumen impaction and mild dizziness; however, she denies feeling off balance. She reports that she is going to see an ear, nose, throat specialist on Friday, 01/27/2022. She also has upcoming cardiology and neurology appointments.    The patient was in the intensive care unit on 01/15/2023 for 24 hours because tPA was administered. Dr. Rodgers informed her that he did not think she had a stroke and that he believes she had a severe episode of vertigo. On 01/16/2023, Dr. Hartmann informed the patient that he thought there was a good chance she had a stroke and the tPA cleared everything up, which was why her tests were normal. She was prescribed aspirin and Lipitor. The patient was informed by a physician assistant that a patent foramen ovale appeared on her echocardiogram and the treatment for that at her age is baby aspirin. A lower venous Doppler was performed on the patient to look for a deep vein thrombosis, which was normal. She was later informed by Dr. Crews that he did not see any sign of a patent foramen ovale. The patient has an upcoming appointment with Dr. Crews on 02/09/2023.  She has not made an appointment with the stroke clinic yet. The patient has concerns about the link between dementia and statins.    Prior to her most recent visit, the patient was experiencing intermittent shortness of breath on exertion. She took the prescribed cough medicine twice daily, which did improve her symptoms. After her most recent visit, she used her albuterol sulfate before exercising with her stationary bike, which significantly improved her shortness of breath. She is not certain  she needs the fluticasone as the albuterol improved her symptoms. She feels that her most recent episode was triggered by [employer] decorations.     The following portions of the patient's history were reviewed and updated as appropriate: allergies, current medications, past family history, past medical history, past social history, past surgical history and problem list.    This patient is accompanied by their self who contributes to the history of their care.    The following portions of the patient's history were reviewed and updated as appropriate: allergies, current medications, past family history, past medical history, past social history, past surgical history and problem list.    Subjective      Review of Systems:   Review of Systems - See HPI    Past Medical History:   Past Medical History:   Diagnosis Date   • Allergic    • Allergic rhinitis Lifelong    Moderately severe with nasal polyps   • Asthma 2011    Severe flare with bronchoscopy from pine needle exposure   • CTS (carpal tunnel syndrome) 2004    Bilateral   • Eosinophilic esophagitis 2018    Biopsy-positive   • GERD (gastroesophageal reflux disease) 2016   • Lumbar scoliosis 2013    Recurrent discomfort   • Lumbar spinal stenosis 2013   • Migraine 2004    hospitalized   • Mononucleosis 1962   • Osteoarthritis of hands, bilateral    • Post menopausal syndrome 2000    Hot flashes with impaired sleep   • Sleep disorder    • Varicose vein of leg 1990    venous ligation left leg       Past Surgical History:   Past Surgical History:   Procedure Laterality Date   • BRONCHOSCOPY  2011    Severe allergic pneumonitis   • COLONOSCOPY  2013    Benign study   • FRACTURE SURGERY  2009    left pinkie finger   • LARYNGOSCOPY  2017    Indirect exam   • SKIN SURGERY      BASIL CELL   • SQUAMOUS CELL CARCINOMA EXCISION Left     L leg   • TONSILLECTOMY  1961   • TONSILLECTOMY     • VEIN SURGERY Left 1990    Ligation of left leg       Family History:   Family History    Problem Relation Age of Onset   • Dementia Mother    • Hearing loss Mother    • Osteoarthritis Mother    • Esophageal cancer Father           age 60   • Cancer Father         Keller's esophagus   • Achalasia Brother    • Osteoarthritis Brother    • Pancreatic cancer Brother    • Pancreatic cancer Maternal Grandmother    • Cancer Maternal Grandmother         Pancreatic   • Heart failure Paternal Grandmother    • Heart attack Paternal Grandfather          age 65   • Osteoarthritis Brother         Bilateral hip replacements   • Breast cancer Neg Hx    • Ovarian cancer Neg Hx        Social History:   Social History     Socioeconomic History   • Marital status:    Tobacco Use   • Smoking status: Never   • Smokeless tobacco: Never   Vaping Use   • Vaping Use: Never used   Substance and Sexual Activity   • Alcohol use: No   • Drug use: No   • Sexual activity: Yes     Partners: Male       Tobacco History:   Social History     Tobacco Use   Smoking Status Never   Smokeless Tobacco Never       Medications:     Current Outpatient Medications:   •  albuterol sulfate  (90 Base) MCG/ACT inhaler, Inhale 2 puffs Every 4 (Four) Hours As Needed for Wheezing or Shortness of Air., Disp: 18 g, Rfl: 1  •  aspirin 81 MG chewable tablet, Chew 1 tablet Daily., Disp: 30 tablet, Rfl: 0  •  atorvastatin (LIPITOR) 40 MG tablet, Take 1 tablet by mouth Every Night., Disp: 90 tablet, Rfl: 0  •  Calcium Carbonate-Vitamin D (CALTRATE 600+D PO), Take 1 tablet by mouth Daily., Disp: , Rfl:   •  fluticasone (FLONASE) 50 MCG/ACT nasal spray, 1 spray into each nostril Every Morning., Disp: , Rfl:   •  Glucosamine-Chondroit-Vit C-Mn (GLUCOSAMINE 1500 COMPLEX PO), Take 1 tablet by mouth daily., Disp: , Rfl:   •  loratadine (CLARITIN) 10 MG tablet, Take 1 tablet by mouth daily as needed., Disp: , Rfl:   •  montelukast (SINGULAIR) 10 MG tablet, TAKE ONE TABLET BY MOUTH DAILY AS NEEDED, Disp: 90 tablet, Rfl: 1  •  Multiple  Vitamins-Minerals (WOMENS ONE DAILY) tablet, Take 1 tablet by mouth daily., Disp: , Rfl:   •  vitamin C (ASCORBIC ACID) 500 MG tablet, Take 1 tablet by mouth daily., Disp: , Rfl:   •  Vitamin D, Cholecalciferol, 25 MCG (1000 UT) capsule, Take 1,000 Units by mouth Daily., Disp: , Rfl:     Allergies:   Allergies   Allergen Reactions   • Shellfish Allergy Hives     oysters   • Poison Ivy Extract [Poison Ivy Extract] Rash       Objective   Objective     Physical Exam:  Vital Signs:   Vitals:    01/25/23 0952   BP: 118/76   BP Location: Left arm   Patient Position: Sitting   Cuff Size: Adult   Pulse: 68   Temp: 97.8 °F (36.6 °C)   TempSrc: Infrared   SpO2: 98%   Weight: 62.1 kg (136 lb 12.8 oz)     Body mass index is 22.76 kg/m².     Physical Exam  Nursing note reviewed  Const: NAD, A&Ox4, Pleasant, Cooperative  Eyes: EOMI, no conjunctivitis  ENT: No nasal discharge present, neck supple  Cardiac: Regular rate and rhythm, no cyanosis  Resp: Respiratory rate within normal limits, no increased work of breathing, no audible wheezing or retractions noted  GI: No distention or ascites  MSK: Motor and sensation grossly intact in bilateral upper extremities  Neurologic: CN II-XII grossly intact  Psych: Appropriate mood and behavior.  Skin: Warm, dry  Procedures/Radiology     Procedures  No radiology results for the last 7 days     Assessment & Plan   Assessment / Plan      Assessment/Plan:   Problems Addressed This Visit  Diagnoses and all orders for this visit:    1. Vertigo (Primary)  Assessment & Plan:  She will follow up with ENT on Friday, 01/27/2023, for further evaluation and treatment.      2. ANNETTA (obstructive sleep apnea)    3. Aortic valve calcification  Comments:  She will continue on the statin as long as she tolerates it well, will discuss with cardiology at visit    Problem List Items Addressed This Visit        Cardiac and Vasculature    Aortic valve calcification    Overview     She will continue on the statin as  long as she tolerates it well, will discuss with cardiology at visit            ENT    Vertigo - Primary    Overview     Hospitalized for stroke vs severe vertigo. Initially concern for stroke due to PFO however no evidence of PFO on echo.         Current Assessment & Plan     She will follow up with ENT on Friday, 01/27/2023, for further evaluation and treatment.            Sleep    ANNETTA (obstructive sleep apnea)    Overview     Thinks more stress than anything, after retired stopped having any issues at all          See patient diagnoses and orders along with patient instructions for assessment, plan, and changes to care for patient.    There are no Patient Instructions on file for this visit.    Follow Up:   No follow-ups on file.    MDM     [facility] [address]  [facility]  [address]  [facility] [address]  Fax [phone]  Phone [phone]    Transcribed from ambient dictation for Stepan Ragland DO by Scott Bruno.  01/25/23   12:30 EST    Patient or patient representative verbalized consent to the visit recording.  I have personally performed the services described in this document as transcribed by the above individual, and it is both accurate and complete.

## 2023-01-26 ENCOUNTER — READMISSION MANAGEMENT (OUTPATIENT)
Dept: CALL CENTER | Facility: HOSPITAL | Age: 73
End: 2023-01-26
Payer: MEDICARE

## 2023-01-26 PROBLEM — I35.9 AORTIC VALVE CALCIFICATION: Status: ACTIVE | Noted: 2023-01-26

## 2023-01-26 NOTE — OUTREACH NOTE
Stroke Week 2 Survey    Flowsheet Row Responses   [facility] patient discharged from? [facility]   Does the patient have one of the following disease processes/diagnoses(primary or secondary)? Stroke   Week 2 attempt successful? Yes   Call start time 1252   Call end time 1253   Discharge diagnosis Acute ischemic stroke    Is the patient taking all medications as directed (includes completed medication regime)? Yes   Does the patient have a primary care provider?  Yes   Comments regarding PCP seen PCP yesterday (1/25)   Has the patient kept scheduled appointments due by today? Yes   Comments f/u with cardiology on 2/9   Has home health visited the patient within 72 hours of discharge? N/A   Psychosocial issues? No   Does the patient require any assistance with activities of daily living such as eating, bathing, dressing, walking, etc.? No   Does the patient have any residual symptoms from stroke/TIA? No   What is the patient's perception of their health status since discharge? Improving   Is the patient/caregiver able to teach back signs and symptoms related to disease process for when to call PCP? Yes   Is the patient/caregiver able to teach back signs and symptoms related to disease process for when to call 911? Yes   Is the patient/caregiver able to teach back the hierarchy of who to call/visit for symptoms/problems? PCP, Specialist, Home health nurse, Urgent Care, ED, 911 Yes   Week 2 call completed? Yes   Revoked No further contact(revokes)-requires comment   Is the patient interested in additional calls from an ambulatory ?  NOTE:  applies to high risk patients requiring additional follow-up. No   Wrap up additional comments Doing well, no further calls needed.          PAULA VERDUZCO - Registered Nurse

## 2023-01-30 ENCOUNTER — HOSPITAL ENCOUNTER (OUTPATIENT)
Dept: MAMMOGRAPHY | Facility: HOSPITAL | Age: 73
Discharge: HOME | End: 2023-01-30
Admitting: FAMILY MEDICINE
Payer: MEDICARE

## 2023-01-30 DIAGNOSIS — Z12.31 VISIT FOR SCREENING MAMMOGRAM: ICD-10-CM

## 2023-01-30 PROCEDURE — 77063 BREAST TOMOSYNTHESIS BI: CPT

## 2023-01-30 PROCEDURE — 77067 SCR MAMMO BI INCL CAD: CPT | Performed by: RADIOLOGY

## 2023-01-30 PROCEDURE — 77067 SCR MAMMO BI INCL CAD: CPT

## 2023-01-30 PROCEDURE — 77063 BREAST TOMOSYNTHESIS BI: CPT | Performed by: RADIOLOGY

## 2023-02-09 ENCOUNTER — OFFICE VISIT (OUTPATIENT)
Dept: CARDIOLOGY | Facility: CLINIC | Age: 73
End: 2023-02-09
Payer: MEDICARE

## 2023-02-09 VITALS
SYSTOLIC BLOOD PRESSURE: 110 MMHG | BODY MASS INDEX: 22.99 KG/M2 | WEIGHT: 138 LBS | DIASTOLIC BLOOD PRESSURE: 66 MMHG | HEART RATE: 65 BPM | HEIGHT: 65 IN | OXYGEN SATURATION: 97 %

## 2023-02-09 DIAGNOSIS — E78.2 MIXED HYPERLIPIDEMIA: ICD-10-CM

## 2023-02-09 DIAGNOSIS — Q21.12 PFO (PATENT FORAMEN OVALE): Primary | ICD-10-CM

## 2023-02-09 DIAGNOSIS — G45.9 TIA (TRANSIENT ISCHEMIC ATTACK): ICD-10-CM

## 2023-02-09 DIAGNOSIS — I35.9 AORTIC VALVE CALCIFICATION: ICD-10-CM

## 2023-02-09 PROCEDURE — 99213 OFFICE O/P EST LOW 20 MIN: CPT | Performed by: INTERNAL MEDICINE

## 2023-02-09 NOTE — PROGRESS NOTES
Follow-up Visit      Date: 2023  Patient Name: Tatyana Willett  : 1950   MRN: [id number]     Chief Complaint:    Chief Complaint   Patient presents with   • hospital f/u       History of Present Illness: Tatyana Willett is a 72 y.o. female who is here today for follow-up on her stroke symptoms.  She did have symptoms of what might be TIA but the work-up did not reveal any other blockage symptoms or any other anatomical version of it.  She denies any chest pain any shortness of breath any dizziness any palpitation or any other symptoms.  She denies having any lower extremity edema.  She denies any symptoms related to stroke versus CVA.      Problem List     1. CARDIAC  a. Coronary Artery Disease:   i. Asymptomatic     b. Myocardium:   i. Echo 2023: EF 66-70%  Possible PFO     c. Valvular:   i. Trace to mild MR, mild TR     d. Electrical:   i. Normal sinus rhythm     e. Percardium:   i. Normal     2. VASCULAR:  a. Arterial  i. Cerebrovascular disease:   1. Possible TIA s/p tPA MRI brain 2023: Normal study     2.    b. Venous:  i. Venous duplex 2023: No DVT bilaterally          3. CARDIAC RISK FACTORS:         Obstructive Sleep Apnea     4. NON-CARDIAC:  a. Eosinophilic esophagitis  b. Asthma  Vertigo     5. SURGERIES:  a. Tonsillectomy  b. Basal cell carcinoma excision  c. Squamous cell carcinoma excision      Subjective      Review of Systems:   Review of Systems   Constitutional: Positive for fatigue.       Medications:     Current Outpatient Medications:   •  albuterol sulfate  (90 Base) MCG/ACT inhaler, Inhale 2 puffs Every 4 (Four) Hours As Needed for Wheezing or Shortness of Air., Disp: 18 g, Rfl: 1  •  aspirin 81 MG chewable tablet, Chew 1 tablet Daily., Disp: 30 tablet, Rfl: 0  •  atorvastatin (LIPITOR) 40 MG tablet, Take 1 tablet by mouth Every Night., Disp: 90 tablet, Rfl: 0  •  Calcium Carbonate-Vitamin D (CALTRATE 600+D PO), Take 1 tablet by mouth Daily., Disp:  ", Rfl:   •  fluticasone (FLONASE) 50 MCG/ACT nasal spray, 1 spray into each nostril Every Morning., Disp: , Rfl:   •  Glucosamine-Chondroit-Vit C-Mn (GLUCOSAMINE 1500 COMPLEX PO), Take 1 tablet by mouth daily., Disp: , Rfl:   •  loratadine (CLARITIN) 10 MG tablet, Take 1 tablet by mouth daily as needed., Disp: , Rfl:   •  montelukast (SINGULAIR) 10 MG tablet, TAKE ONE TABLET BY MOUTH DAILY AS NEEDED, Disp: 90 tablet, Rfl: 1  •  Multiple Vitamins-Minerals (WOMENS ONE DAILY) tablet, Take 1 tablet by mouth daily., Disp: , Rfl:   •  vitamin C (ASCORBIC ACID) 500 MG tablet, Take 1 tablet by mouth daily., Disp: , Rfl:   •  Vitamin D, Cholecalciferol, 25 MCG (1000 UT) capsule, Take 1,000 Units by mouth Daily., Disp: , Rfl:     Allergies:   Allergies   Allergen Reactions   • Shellfish Allergy Hives     oysters   • Poison Ivy Extract [Poison Ivy Extract] Rash       Objective     Physical Exam:  Vitals:    02/09/23 1115   BP: 110/66   BP Location: Left arm   Patient Position: Sitting   Pulse: 65   SpO2: 97%   Weight: 62.6 kg (138 lb)   Height: 165.1 cm (65\")     Body mass index is 22.96 kg/m².      Constitutional:       General: Not in acute distress.     Appearance: Healthy appearance. Not in distress.     Neck:     JVP: No JVP     Carotid artery: No carotid bruit    Pulmonary:      Effort: Pulmonary effort is normal.      Breath sounds: Normal breath sounds. No wheezing. No rhonchi. No rales.     Cardiovascular:      Normal rate. Regular rhythm. Normal S1. Normal S2.      Murmurs: There is no murmur.      No gallop. No click. No rub.     Abdominal:      General: Bowel sounds are normal.      Palpations: Abdomen is soft.      Tenderness: There is no abdominal tenderness.    Extremities:     Pulses: Good distal pulses     Edema: No edema    Smoking Cessation:   She never smoked    Lab Review:   Lab Results   Component Value Date    GLUCOSE 85 01/18/2023    BUN 13 01/18/2023    CREATININE 0.66 01/18/2023    EGFRIFNONA 72 " 06/03/2021    BCR 19.7 01/18/2023    K 4.6 01/18/2023    CO2 29.0 01/18/2023    CALCIUM 9.2 01/18/2023    ALBUMIN 4.4 01/15/2023    AST 25 01/15/2023    ALT 17 01/15/2023     Lab Results   Component Value Date    WBC 5.09 01/18/2023    HGB 14.0 01/18/2023    HCT 41.9 01/18/2023    MCV 88.8 01/18/2023     01/18/2023     Lab Results   Component Value Date    CHOL 169 01/16/2023    CHLPL 157 06/13/2014    TRIG 26 01/16/2023    HDL 63 (H) 01/16/2023     01/16/2023     Lab Results   Component Value Date    TSH 7.010 (H) 01/15/2023     Lab Results   Component Value Date    HGBA1C 4.90 01/16/2023           Assessment / Plan      Assessment:   Diagnosis Plan   1. PFO (patent foramen ovale)  Adult Transesophageal Echo (ERON) W/ Cont if Necessary Per Protocol      2. Mixed hyperlipidemia  Lipid Panel      3. Aortic valve calcification        4. TIA (transient ischemic attack)             Plan:  1.  Patient did have a very delayed uptake of few bubbles which believe is intrapulmonary shunt and not a PFO.  Patient also did not seem to have any stroke or the symptoms of vertigo and did not have any other findings related to cerebrovascular accident.  We will schedule her for ERON and further delineate the anatomy of her PFO.    2.  We will get her lipid profile to make sure her cholesterol is under good control.    3.  Further recommendation based on ERON findings.      Follow Up:       Return in about 6 months (around 8/9/2023).    Ba Crews MD

## 2023-02-13 ENCOUNTER — PREP FOR SURGERY (OUTPATIENT)
Dept: OTHER | Facility: HOSPITAL | Age: 73
End: 2023-02-13
Payer: MEDICARE

## 2023-02-15 ENCOUNTER — HOSPITAL ENCOUNTER (OUTPATIENT)
Dept: CARDIOLOGY | Facility: HOSPITAL | Age: 73
Discharge: HOME | End: 2023-02-15
Admitting: INTERNAL MEDICINE
Payer: MEDICARE

## 2023-02-15 VITALS
SYSTOLIC BLOOD PRESSURE: 114 MMHG | OXYGEN SATURATION: 95 % | HEART RATE: 65 BPM | DIASTOLIC BLOOD PRESSURE: 68 MMHG | RESPIRATION RATE: 14 BRPM

## 2023-02-15 DIAGNOSIS — Q21.12 PFO (PATENT FORAMEN OVALE): ICD-10-CM

## 2023-02-15 PROBLEM — Z86.73 HISTORY OF TRANSIENT ISCHEMIC ATTACK (TIA): Status: ACTIVE | Noted: 2023-02-15

## 2023-02-15 PROCEDURE — 93325 DOPPLER ECHO COLOR FLOW MAPG: CPT

## 2023-02-15 PROCEDURE — 93312 ECHO TRANSESOPHAGEAL: CPT | Performed by: INTERNAL MEDICINE

## 2023-02-15 PROCEDURE — 93325 DOPPLER ECHO COLOR FLOW MAPG: CPT | Performed by: INTERNAL MEDICINE

## 2023-02-15 PROCEDURE — 93321 DOPPLER ECHO F-UP/LMTD STD: CPT

## 2023-02-15 PROCEDURE — 93321 DOPPLER ECHO F-UP/LMTD STD: CPT | Performed by: INTERNAL MEDICINE

## 2023-02-15 PROCEDURE — 93312 ECHO TRANSESOPHAGEAL: CPT

## 2023-02-15 PROCEDURE — 25010000002 MIDAZOLAM PER 1 MG: Performed by: INTERNAL MEDICINE

## 2023-02-15 RX ORDER — MIDAZOLAM HYDROCHLORIDE 1 MG/ML
INJECTION INTRAMUSCULAR; INTRAVENOUS
Status: COMPLETED | OUTPATIENT
Start: 2023-02-15 | End: 2023-02-15

## 2023-02-15 RX ADMIN — MIDAZOLAM HYDROCHLORIDE 2 MG: 1 INJECTION, SOLUTION INTRAMUSCULAR; INTRAVENOUS at 09:29

## 2023-02-15 NOTE — H&P
[facility] at [facility]  Cardiovascular History and Physical Note           Patient is a 72-year-old female with no prior cardiac history but history of asthma and esophagitis who was recently hospitalized last month at [facility] for symptoms concerning for a acute CVA.  She was initially treated with tPA.  Imaging did not suggest CVA.  An echocardiogram performed during her hospitalization suggested a possible PFO.  She was evaluated last week in the office and after further discussion a transesophageal echocardiogram was recommended for definitive assessment of potential PFO.  She presents today to undergo the procedure.    Cardiac risk factors: Advanced age    Past medical and surgical history, social and family history reviewed in EMR.    REVIEW OF SYSTEMS:   H&P ROS reviewed and pertinent CV ROS as noted in HPI.         Vital Sign Min/Max for last 24 hours  No data recorded   BP  Min: 107/68  Max: 107/68   Pulse  Min: 61  Max: 61   Resp  Min: 12  Max: 12   SpO2  Min: 97 %  Max: 97 %   No data recorded    No intake or output data in the 24 hours ending 02/15/23 0830        Constitutional:       Appearance: Healthy appearance. Well-developed.   Eyes:      General: Lids are normal. No scleral icterus.     Conjunctiva/sclera: Conjunctivae normal.   HENT:      Head: Normocephalic and atraumatic.   Neck:      Thyroid: No thyromegaly.      Vascular: No carotid bruit or JVD.   Pulmonary:      Effort: Pulmonary effort is normal.      Breath sounds: Normal breath sounds. No wheezing. No rhonchi. No rales.   Cardiovascular:      Normal rate. Regular rhythm.      Murmurs: There is no murmur.      No gallop. No rub.   Pulses:     Intact distal pulses.   Edema:     Peripheral edema absent.   Abdominal:      General: There is no distension.      Palpations: Abdomen is soft. There is no abdominal mass.   Musculoskeletal:      Cervical back: Normal range of motion. Skin:     General: Skin  is warm and dry.      Findings: No rash.   Neurological:      General: No focal deficit present.      Mental Status: Alert and oriented to person, place, and time.      Gait: Gait is intact.   Psychiatric:         Attention and Perception: Attention normal.         Mood and Affect: Mood normal.         Behavior: Behavior normal.           Lab Review:   Labs reviewed in the electronic medical record.  Pertinent findings include:  Lab Results   Component Value Date    GLUCOSE 85 01/18/2023    BUN 13 01/18/2023    CREATININE 0.66 01/18/2023    EGFR 93.3 01/18/2023    BCR 19.7 01/18/2023    K 4.6 01/18/2023    CO2 29.0 01/18/2023    CALCIUM 9.2 01/18/2023    ALBUMIN 4.4 01/15/2023    AST 25 01/15/2023    ALT 17 01/15/2023     Lab Results   Component Value Date    WBC 5.09 01/18/2023    HGB 14.0 01/18/2023    HCT 41.9 01/18/2023    MCV 88.8 01/18/2023     01/18/2023     Lab Results   Component Value Date    CHOL 169 01/16/2023    CHLPL 157 06/13/2014    TRIG 26 01/16/2023    HDL 63 (H) 01/16/2023     01/16/2023                Active Hospital Problems    Diagnosis    • **PFO (patent foramen ovale)      · Echo (1/16/2023): LVEF 66-70%.  Calcification aortic valve.  Bubble study positive for PFO  · Rope score= 3     • History of transient ischemic attack (TIA)      · Possible TIA s/p tPA MRI brain 1/16/2023  · Echo (1/16/2023): Possible positive bubble study.  Normal LVEF.  Calcification aortic valve     • Aortic valve calcification      · Echo (1/16/2023): LVEF 66 - 70%.  Calcification aortic valve.  RVSP less than 35 mmHg       Patient presents today to undergo a transesophageal echocardiogram for further assessment of a potential PFO given recent TIA.  The risks and benefits of the procedure were discussed and the patient is agreeable to proceed.       · Proceed with transesophageal echocardiogram  · Further recommendations to follow      NAI Germain      I have seen and examined the patient,  case was discussed with the physician extender, reviewed the above note, necessary changes were made and I agree with the final note.   Ba Crews MD

## 2023-02-26 LAB
BH CV ECHO SHUNT ASSESSMENT PERFORMED (HIDDEN SCRIPTING): 1
MAXIMAL PREDICTED HEART RATE: 148 BPM
STRESS TARGET HR: 126 BPM

## 2023-03-10 ENCOUNTER — LAB (OUTPATIENT)
Dept: LAB | Facility: HOSPITAL | Age: 73
End: 2023-03-10
Payer: MEDICARE

## 2023-03-10 DIAGNOSIS — E78.2 MIXED HYPERLIPIDEMIA: ICD-10-CM

## 2023-03-10 LAB
CHOLEST SERPL-MCNC: 141 MG/DL (ref 0–200)
HDLC SERPL-MCNC: 69 MG/DL (ref 40–60)
LDLC SERPL CALC-MCNC: 63 MG/DL (ref 0–100)
LDLC/HDLC SERPL: 0.93 {RATIO}
TRIGL SERPL-MCNC: 38 MG/DL (ref 0–150)
VLDLC SERPL-MCNC: 9 MG/DL (ref 5–40)

## 2023-03-10 PROCEDURE — 36415 COLL VENOUS BLD VENIPUNCTURE: CPT

## 2023-03-10 PROCEDURE — 80061 LIPID PANEL: CPT

## 2023-03-17 ENCOUNTER — HOSPITAL ENCOUNTER (OUTPATIENT)
Dept: MAMMOGRAPHY | Facility: HOSPITAL | Age: 73
Discharge: HOME | End: 2023-03-17
Admitting: RADIOLOGY
Payer: MEDICARE

## 2023-03-17 DIAGNOSIS — R92.8 ABNORMAL MAMMOGRAM: ICD-10-CM

## 2023-03-17 PROCEDURE — G0279 TOMOSYNTHESIS, MAMMO: HCPCS

## 2023-03-17 PROCEDURE — 77066 DX MAMMO INCL CAD BI: CPT | Performed by: RADIOLOGY

## 2023-03-17 PROCEDURE — G0279 TOMOSYNTHESIS, MAMMO: HCPCS | Performed by: RADIOLOGY

## 2023-03-17 PROCEDURE — 77066 DX MAMMO INCL CAD BI: CPT

## 2023-03-23 ENCOUNTER — TELEPHONE (OUTPATIENT)
Dept: CARDIOLOGY | Facility: CLINIC | Age: 73
End: 2023-03-23
Payer: MEDICARE

## 2023-03-27 ENCOUNTER — OFFICE VISIT (OUTPATIENT)
Dept: SLEEP MEDICINE | Facility: HOSPITAL | Age: 73
End: 2023-03-27
Payer: MEDICARE

## 2023-03-27 VITALS
DIASTOLIC BLOOD PRESSURE: 54 MMHG | SYSTOLIC BLOOD PRESSURE: 106 MMHG | WEIGHT: 139 LBS | HEART RATE: 63 BPM | BODY MASS INDEX: 23.16 KG/M2 | HEIGHT: 65 IN | OXYGEN SATURATION: 96 %

## 2023-03-27 DIAGNOSIS — G47.8 CATATHRENIA: ICD-10-CM

## 2023-03-27 DIAGNOSIS — G47.33 OSA (OBSTRUCTIVE SLEEP APNEA): Primary | ICD-10-CM

## 2023-03-27 PROCEDURE — 99214 OFFICE O/P EST MOD 30 MIN: CPT | Performed by: INTERNAL MEDICINE

## 2023-03-27 PROCEDURE — 1159F MED LIST DOCD IN RCRD: CPT | Performed by: INTERNAL MEDICINE

## 2023-03-27 PROCEDURE — 1160F RVW MEDS BY RX/DR IN RCRD: CPT | Performed by: INTERNAL MEDICINE

## 2023-03-27 NOTE — PROGRESS NOTES
Follow Up Office Visit      Patient Name: Tatyana Willett    Chief Complaint:    Chief Complaint   Patient presents with   • Follow-up       History of Present Illness: Tatyana Willett is a 72 y.o. female who is here today for follow up of sleep apnea, not using CPAP.    72-year-old female with past medical history of eosinophilic esophagitis and GERD, osteoarthritis, asthma, mild obstructive sleep apnea, catatonia presenting for follow-up.  Patient was previously seen by Dr. Iyer.  She was found to have mild obstructive sleep apnea which is worse in nonsupine sleep.  Patient states that she was using CPAP device and looking through her downloads in the past she had excellent compliance and treated sleep apnea with CPAP.  She states that when [employer] came out with the recall she called our office and she was advised to continue using the CPAP device but she did not want to continue using it due to risk of malignancy.  She stopped using the device.  Her  still complains about her moaning and groaning during sleep.  In the meantime patient also was admitted with vertigo.  She had extreme dizziness and was admitted in the hospital with possible CVA.  Received TNKase.  She was seen by neurology.  No definite evidence of stroke but she was told that they cannot rule out CVA due to patient receiving TNKase.  She was recommended to follow-up with us for management of her sleep apnea.  She was also seen by ENT and thought there may have been some viral infection leading to vestibular nerve dysfunction causing dizziness.  All of those symptoms have resolved now.    We discussed at length about available treatment options for mild sleep apnea.  Patient however states that her sleep is well.  She denies any significant daytime fatigue or tiredness symptoms.  Corinth score is 7 out of 24.    Subjective      Review of Systems:   Review of Systems   Constitutional: Negative.    HENT: Negative.   "  Respiratory: Negative.    Cardiovascular: Negative.    Gastrointestinal: Negative.    Endocrine: Negative.    Musculoskeletal: Negative.    Skin: Negative.    Neurological: Negative.    Hematological: Negative.    Psychiatric/Behavioral: Negative.    All other systems reviewed and are negative.      The following portions of the patient's history were reviewed and updated as appropriate: allergies, current medications, past family history, past medical history, past social history, past surgical history and problem list.    Objective     Physical Exam:  Vital Signs:   Vitals:    03/27/23 0824   BP: 106/54   Pulse: 63   SpO2: 96%   Weight: 63 kg (139 lb)   Height: 165.1 cm (65\")     Body mass index is 23.13 kg/m².    Physical Exam  Vitals and nursing note reviewed.   Constitutional:       General: She is not in acute distress.     Appearance: She is well-developed. She is not diaphoretic.   HENT:      Head: Normocephalic and atraumatic.      Comments: Mallampati 2 airway, deep redundant soft palate     Nose: Nose normal.   Neck:      Thyroid: No thyromegaly.      Trachea: No tracheal deviation.   Cardiovascular:      Rate and Rhythm: Normal rate and regular rhythm.      Heart sounds: Normal heart sounds. No murmur heard.    No friction rub.   Pulmonary:      Effort: Pulmonary effort is normal. No respiratory distress.      Breath sounds: Normal breath sounds. No wheezing.   Musculoskeletal:      Left lower leg: No edema.   Neurological:      Mental Status: She is alert and oriented to person, place, and time.   Psychiatric:         Behavior: Behavior normal.         Thought Content: Thought content normal.         Judgment: Judgment normal.         Results Review:   I reviewed the patient's new clinical results.    Overnight sleep study reviewed and mild ANNETTA which is worse in supine sleep.        Assessment / Plan      Assessment:   Problem List Items Addressed This Visit        Sleep    ANNETTA (obstructive sleep " apnea) - Primary    Overview     Thinks more stress than anything, after retired stopped having any issues at all        Other Visit Diagnoses     Catathrenia              Plan:   1.  Patient has mild sleep apnea along with catathrenia.  Discussed with patient that best treatment option for her given the symptoms is CPAP therapy.  She is not willing to go on old CPAP device with [facility].  She has not registered her machine with [facility] either.  I strongly encouraged her to contact [facility] and see if she can get a new machine through them that will be preferable.  She will make that phone call and try to get that arranged.  If she is unable to then she may need another sleep study per Medicare guidelines to requalify for the diagnosis of sleep apnea.  2.  Patient is interested in inspire therapy.  I spent time talking about how that device works and what are some of the potential side effects and complications from that procedure.  I am not certain if that will work as good for catathrenia as CPAP therapy as no studies have been  done to look for that.  3.  We also discussed the mandibular advancement device and how that can help with sleep apnea.  There have been some studies suggesting that it may help improve other symptoms or catathrenia as well.    4.  As far a as her sleep apnea was worse in supine sleep.  s sleep apnea is concerned alone she can just have nonsupine sleep she really does not have any other significant symptoms from sleep apnea.  Discussed that generally mild sleep apnea as not related to any significant cardiovascular or neurological morbidity.      All the above options were discussed with her.  She wants to discuss with her  and will let us know how she wants to proceed.  She will be in touch with our office.      Follow Up:   Pt will call as needed.     Discussed plan of care in detail with patient today. Patient verbally understands and agrees. I spent 35 minutes on  this date of service. This time includes time spent by me in the following activities:preparing for the visit, reviewing tests, obtaining and/or reviewing a separately obtained history, performing a medically appropriate examination, counseling the patient, and/or documenting information in the medical record. This time excludes other separate billable services such as interpretation of tests or procedures, if applicable.      Gerald Whitmore MD  Pulmonary Critical Care and Sleep Medicine

## 2023-04-06 DIAGNOSIS — J30.2 SEASONAL ALLERGIC RHINITIS, UNSPECIFIED TRIGGER: ICD-10-CM

## 2023-04-06 DIAGNOSIS — J45.20 MILD INTERMITTENT ASTHMA WITHOUT COMPLICATION: ICD-10-CM

## 2023-04-06 RX ORDER — MONTELUKAST SODIUM 10 MG/1
TABLET ORAL
Qty: 90 TABLET | Refills: 1 | Status: SHIPPED | OUTPATIENT
Start: 2023-04-06

## 2023-04-13 RX ORDER — ATORVASTATIN CALCIUM 40 MG/1
40 TABLET, FILM COATED ORAL NIGHTLY
Qty: 90 TABLET | Refills: 3 | Status: SHIPPED | OUTPATIENT
Start: 2023-04-13

## 2023-06-08 ENCOUNTER — OFFICE VISIT (OUTPATIENT)
Dept: OBSTETRICS AND GYNECOLOGY | Facility: CLINIC | Age: 73
End: 2023-06-08
Payer: MEDICARE

## 2023-06-08 VITALS — WEIGHT: 139.2 LBS | DIASTOLIC BLOOD PRESSURE: 74 MMHG | SYSTOLIC BLOOD PRESSURE: 112 MMHG | BODY MASS INDEX: 23.16 KG/M2

## 2023-06-08 DIAGNOSIS — Z01.419 PAP TEST, AS PART OF ROUTINE GYNECOLOGICAL EXAMINATION: Primary | ICD-10-CM

## 2023-06-08 NOTE — PROGRESS NOTES
Gynecologic Annual Exam Note        GYN Annual Exam     CC - Here for annual exam.     Subjective     HPI  Tatyana Willett is a 72 y.o. female, , who presents for annual well woman exam as a(n) previous patient not seen in the last three years.  She is postmenopausal.  Patient denies vaginal bleeding. ..  Patient reports problems with:  none . Pt. reports no urinary incontinence. There were no changes to her medical or surgical history since her last visit.. Partner Status: Marital Status: .  She is sexually active. She has not had new partners.. STD testing recommendations have been explained to the patient and she does not desire STD testing.    Additional OB/GYN History     On HRT? No    Last Pap : 20. Results: negative. HPV: not done.     Last Completed Pap Smear       This patient has no relevant Health Maintenance data.          History of abnormal Pap smear: yes - repeat neg  Family history of uterine, colon, breast, or ovarian cancer: no  Performs monthly Self-Breast Exam: yes  Last mammogram: 3/17/23. Done at .    Last Completed Mammogram            MAMMOGRAM (Every 2 Years) Next due on 3/17/2025      2023  Mammo Diagnostic Digital Tomosynthesis Bilateral With CAD    2023  Mammo Screening Digital Tomosynthesis Bilateral With CAD    2022  Mammo Screening Digital Tomosynthesis Bilateral With CAD    2021  Mammo Diagnostic Digital Tomosynthesis Right With CAD    2021  Mammo Screening Digital Tomosynthesis Bilateral With CAD    Only the first 5 history entries have been loaded, but more history exists.                  Last colonoscopy: has had a colonoscopy 10 year(s) ago.    Last Completed Colonoscopy            COLORECTAL CANCER SCREENING (COLONOSCOPY - Every 10 Years) Next due on 2013  COLONOSCOPY (Done)                  Last DEXA:  and results were Osteopenia  Exercises Regularly: yes  Feelings of Anxiety or Depression:  no      Tobacco Usage?: No       Current Outpatient Medications:     albuterol sulfate  (90 Base) MCG/ACT inhaler, Inhale 2 puffs Every 4 (Four) Hours As Needed for Wheezing or Shortness of Air., Disp: 18 g, Rfl: 1    aspirin 81 MG chewable tablet, Chew 1 tablet Daily., Disp: 30 tablet, Rfl: 0    atorvastatin (LIPITOR) 40 MG tablet, Take 1 tablet by mouth Every Night., Disp: 90 tablet, Rfl: 3    Calcium Carbonate-Vitamin D (CALTRATE 600+D PO), Take 1 tablet by mouth Daily., Disp: , Rfl:     fluticasone (FLONASE) 50 MCG/ACT nasal spray, 1 spray into the nostril(s) as directed by provider Every Morning., Disp: , Rfl:     Glucosamine-Chondroit-Vit C-Mn (GLUCOSAMINE 1500 COMPLEX PO), Take 1 tablet by mouth daily., Disp: , Rfl:     loratadine (CLARITIN) 10 MG tablet, Take 1 tablet by mouth Daily As Needed., Disp: , Rfl:     montelukast (SINGULAIR) 10 MG tablet, TAKE ONE TABLET BY MOUTH DAILY AS NEEDED, Disp: 90 tablet, Rfl: 1    Multiple Vitamins-Minerals (WOMENS ONE DAILY) tablet, Take 1 tablet by mouth Daily., Disp: , Rfl:     vitamin C (ASCORBIC ACID) 500 MG tablet, Take 1 tablet by mouth Daily., Disp: , Rfl:     Vitamin D, Cholecalciferol, 25 MCG (1000 UT) capsule, Take 1 capsule by mouth Daily., Disp: , Rfl:     Patient denies the need for medication refills today.    OB History          2    Para   2    Term   2            AB        Living             SAB        IAB        Ectopic        Molar        Multiple        Live Births                    Past Medical History:   Diagnosis Date    Allergic     Allergic rhinitis Lifelong    Moderately severe with nasal polyps    Asthma 2011    Severe flare with bronchoscopy from pine needle exposure    CTS (carpal tunnel syndrome) 2004    Bilateral    Eosinophilic esophagitis 2018    Biopsy-positive    GERD (gastroesophageal reflux disease) 2016    Hyperlipidemia     Lumbar scoliosis 2013    Recurrent discomfort    Lumbar spinal stenosis 2013    Migraine  2004    hospitalized    Mononucleosis 1962    Osteoarthritis of hands, bilateral     PFO (patent foramen ovale) 2/15/2023    Post menopausal syndrome 2000    Hot flashes with impaired sleep    Sleep apnea 2018    Sleep disorder     Varicose vein of leg 1990    venous ligation left leg        Past Surgical History:   Procedure Laterality Date    BRONCHOSCOPY  2011    Severe allergic pneumonitis    COLONOSCOPY  2013    Benign study    FRACTURE SURGERY  2009    left pinkie finger    LARYNGOSCOPY  2017    Indirect exam    SKIN SURGERY      BASIL CELL    SQUAMOUS CELL CARCINOMA EXCISION Left     L leg    TONSILLECTOMY  1961    TONSILLECTOMY      VEIN SURGERY Left 1990    Ligation of left leg       Health Maintenance   Topic Date Due    PAP SMEAR  06/03/2022    COVID-19 Vaccine (6 - Pfizer series) 01/20/2023    ANNUAL WELLNESS VISIT  07/27/2023    INFLUENZA VACCINE  08/01/2023    COLORECTAL CANCER SCREENING  08/04/2023    LIPID PANEL  03/10/2024    DXA SCAN  03/30/2024    MAMMOGRAM  03/17/2025    TDAP/TD VACCINES (4 - Td or Tdap) 10/08/2028    HEPATITIS C SCREENING  Completed    Pneumococcal Vaccine 65+  Completed    ZOSTER VACCINE  Completed       The additional following portions of the patient's history were reviewed and updated as appropriate: allergies, current medications, past family history, past medical history, past social history, and past surgical history.    Review of Systems   Constitutional: Negative.    Cardiovascular: Negative.    Gastrointestinal: Negative.    Genitourinary: Negative.    Psychiatric/Behavioral: Negative.         I have reviewed and agree with the HPI, ROS, and historical information as entered above. NAI Pugh           Objective   /74   Wt 63.1 kg (139 lb 3.2 oz)   LMP  (LMP Unknown)   BMI 23.16 kg/m² menopausal    Physical Exam  Vitals and nursing note reviewed. Exam conducted with a chaperone present.   Constitutional:       General: She is not in acute distress.      Appearance: Normal appearance. She is well-developed. She is not ill-appearing.   Neck:      Thyroid: No thyroid mass or thyromegaly.   Pulmonary:      Effort: Pulmonary effort is normal. No respiratory distress or retractions.   Chest:      Chest wall: No mass.   Breasts:     Right: Normal. No mass, nipple discharge, skin change or tenderness.      Left: Normal. No mass, nipple discharge, skin change or tenderness.   Abdominal:      General: There is no distension.      Palpations: Abdomen is soft. Abdomen is not rigid. There is no mass.      Tenderness: There is no abdominal tenderness. There is no guarding or rebound.      Hernia: No hernia is present.   Genitourinary:     General: Normal vulva.      Labia:         Right: No rash, tenderness or lesion.         Left: No rash, tenderness or lesion.       Vagina: Normal. No vaginal discharge or lesions.      Cervix: Normal. No discharge or lesion.      Uterus: Normal. Not enlarged, not fixed and not tender.       Adnexa: Right adnexa normal and left adnexa normal.        Right: No mass or tenderness.          Left: No mass or tenderness.        Rectum: Normal. No external hemorrhoid.   Musculoskeletal:      Cervical back: No muscular tenderness.   Skin:     General: Skin is warm and dry.   Neurological:      Mental Status: She is alert and oriented to person, place, and time.   Psychiatric:         Mood and Affect: Mood normal.         Behavior: Behavior normal.          Assessment and Plan    Problem List Items Addressed This Visit    None  Visit Diagnoses       Pap test, as part of routine gynecological examination    -  Primary    Relevant Orders    LIQUID-BASED PAP SMEAR WITH HPV GENOTYPING IF ASCUS ([facility])            GYN annual well woman exam.   Reviewed monthly self breast exams.  Instructed to call with lumps, pain, or breast discharge.  Mammogram up to date.  Recommended use of Vitamin D and getting adequate calcium in her diet. (1500mg)  Colonoscopy  due in August, pt will schedule.  Recommended Flu Vaccine in Fall of each year.  RTC in 1 year or PRN with problems.  Return in about 1 year (around 6/8/2024) for Annual physical.    Bailey Alva, APRN  06/08/2023

## 2023-06-12 LAB — REF LAB TEST METHOD: NORMAL

## 2023-07-31 ENCOUNTER — LAB (OUTPATIENT)
Dept: LAB | Facility: HOSPITAL | Age: 73
End: 2023-07-31
Payer: MEDICARE

## 2023-07-31 ENCOUNTER — OFFICE VISIT (OUTPATIENT)
Dept: FAMILY MEDICINE CLINIC | Facility: CLINIC | Age: 73
End: 2023-07-31
Payer: MEDICARE

## 2023-07-31 VITALS
SYSTOLIC BLOOD PRESSURE: 118 MMHG | HEIGHT: 65 IN | BODY MASS INDEX: 23.09 KG/M2 | TEMPERATURE: 98.1 F | DIASTOLIC BLOOD PRESSURE: 62 MMHG | HEART RATE: 80 BPM | WEIGHT: 138.6 LBS

## 2023-07-31 DIAGNOSIS — Z91.81 AT HIGH RISK FOR FALLS: ICD-10-CM

## 2023-07-31 DIAGNOSIS — Z13.29 SCREENING FOR ENDOCRINE DISORDER: ICD-10-CM

## 2023-07-31 DIAGNOSIS — E53.8 B12 DEFICIENCY: ICD-10-CM

## 2023-07-31 DIAGNOSIS — Z00.00 MEDICARE ANNUAL WELLNESS VISIT, SUBSEQUENT: Primary | ICD-10-CM

## 2023-07-31 DIAGNOSIS — Z13.0 SCREENING FOR DEFICIENCY ANEMIA: ICD-10-CM

## 2023-07-31 DIAGNOSIS — E78.5 DYSLIPIDEMIA: ICD-10-CM

## 2023-07-31 DIAGNOSIS — E55.9 VITAMIN D DEFICIENCY: ICD-10-CM

## 2023-07-31 DIAGNOSIS — F40.243 ANXIETY WITH FLYING: ICD-10-CM

## 2023-07-31 LAB
25(OH)D3 SERPL-MCNC: 56.9 NG/ML (ref 30–100)
ALBUMIN SERPL-MCNC: 4.6 G/DL (ref 3.5–5.2)
ALBUMIN/GLOB SERPL: 2 G/DL
ALP SERPL-CCNC: 58 U/L (ref 39–117)
ALT SERPL W P-5'-P-CCNC: 29 U/L (ref 1–33)
ANION GAP SERPL CALCULATED.3IONS-SCNC: 11.2 MMOL/L (ref 5–15)
AST SERPL-CCNC: 26 U/L (ref 1–32)
BILIRUB SERPL-MCNC: 0.5 MG/DL (ref 0–1.2)
BILIRUB UR QL STRIP: NEGATIVE
BUN SERPL-MCNC: 14 MG/DL (ref 8–23)
BUN/CREAT SERPL: 17.9 (ref 7–25)
CALCIUM SPEC-SCNC: 9.8 MG/DL (ref 8.6–10.5)
CHLORIDE SERPL-SCNC: 105 MMOL/L (ref 98–107)
CHOLEST SERPL-MCNC: 132 MG/DL (ref 0–200)
CLARITY UR: CLEAR
CO2 SERPL-SCNC: 27.8 MMOL/L (ref 22–29)
COLOR UR: YELLOW
CREAT SERPL-MCNC: 0.78 MG/DL (ref 0.57–1)
DEPRECATED RDW RBC AUTO: 41.4 FL (ref 37–54)
EGFRCR SERPLBLD CKD-EPI 2021: 80.8 ML/MIN/1.73
ERYTHROCYTE [DISTWIDTH] IN BLOOD BY AUTOMATED COUNT: 12.6 % (ref 12.3–15.4)
GLOBULIN UR ELPH-MCNC: 2.3 GM/DL
GLUCOSE SERPL-MCNC: 90 MG/DL (ref 65–99)
GLUCOSE UR STRIP-MCNC: NEGATIVE MG/DL
HCT VFR BLD AUTO: 41.4 % (ref 34–46.6)
HDLC SERPL-MCNC: 77 MG/DL (ref 40–60)
HGB BLD-MCNC: 13.8 G/DL (ref 12–15.9)
HGB UR QL STRIP.AUTO: NEGATIVE
KETONES UR QL STRIP: NEGATIVE
LDLC SERPL CALC-MCNC: 46 MG/DL (ref 0–100)
LDLC/HDLC SERPL: 0.63 {RATIO}
LEUKOCYTE ESTERASE UR QL STRIP.AUTO: NEGATIVE
MCH RBC QN AUTO: 29.8 PG (ref 26.6–33)
MCHC RBC AUTO-ENTMCNC: 33.3 G/DL (ref 31.5–35.7)
MCV RBC AUTO: 89.4 FL (ref 79–97)
NITRITE UR QL STRIP: NEGATIVE
PH UR STRIP.AUTO: 7 [PH] (ref 5–8)
PLATELET # BLD AUTO: 207 10*3/MM3 (ref 140–450)
PMV BLD AUTO: 10 FL (ref 6–12)
POTASSIUM SERPL-SCNC: 4.1 MMOL/L (ref 3.5–5.2)
PROT SERPL-MCNC: 6.9 G/DL (ref 6–8.5)
PROT UR QL STRIP: NEGATIVE
RBC # BLD AUTO: 4.63 10*6/MM3 (ref 3.77–5.28)
SODIUM SERPL-SCNC: 144 MMOL/L (ref 136–145)
SP GR UR STRIP: 1.02 (ref 1–1.03)
TRIGL SERPL-MCNC: 31 MG/DL (ref 0–150)
TSH SERPL DL<=0.05 MIU/L-ACNC: 3.34 UIU/ML (ref 0.27–4.2)
UROBILINOGEN UR QL STRIP: NORMAL
VIT B12 BLD-MCNC: 908 PG/ML (ref 211–946)
VLDLC SERPL-MCNC: 9 MG/DL (ref 5–40)
WBC NRBC COR # BLD: 4.28 10*3/MM3 (ref 3.4–10.8)

## 2023-07-31 PROCEDURE — 80061 LIPID PANEL: CPT

## 2023-07-31 PROCEDURE — 85027 COMPLETE CBC AUTOMATED: CPT

## 2023-07-31 PROCEDURE — 82607 VITAMIN B-12: CPT

## 2023-07-31 PROCEDURE — 80053 COMPREHEN METABOLIC PANEL: CPT

## 2023-07-31 PROCEDURE — 81003 URINALYSIS AUTO W/O SCOPE: CPT

## 2023-07-31 PROCEDURE — G0439 PPPS, SUBSEQ VISIT: HCPCS | Performed by: FAMILY MEDICINE

## 2023-07-31 PROCEDURE — 82306 VITAMIN D 25 HYDROXY: CPT

## 2023-07-31 PROCEDURE — 84443 ASSAY THYROID STIM HORMONE: CPT

## 2023-07-31 RX ORDER — ALPRAZOLAM 0.25 MG/1
TABLET ORAL
Qty: 4 TABLET | Refills: 0 | Status: SHIPPED | OUTPATIENT
Start: 2023-07-31

## 2023-07-31 NOTE — PATIENT INSTRUCTIONS
Fall Prevention in the Home, Adult  Falls can cause injuries and affect people of all ages. There are many simple things that you can do to make your home safe and to help prevent falls. Ask for help when making these changes, if needed.  What actions can I take to prevent falls?  General instructions  Use good lighting in all rooms. Replace any light bulbs that burn out, turn on lights if it is dark, and use night-lights.  Place frequently used items in easy-to-reach places. Lower the shelves around your home if necessary.  Set up furniture so that there are clear paths around it. Avoid moving your furniture around.  Remove throw rugs and other tripping hazards from the floor.  Avoid walking on wet floors.  Fix any uneven floor surfaces.  Add color or contrast paint or tape to grab bars and handrails in your home. Place contrasting color strips on the first and last steps of staircases.  When you use a stepladder, make sure that it is completely opened and that the sides and supports are firmly locked. Have someone hold the ladder while you are using it. Do not climb a closed stepladder.  Know where your pets are when moving through your home.  What can I do in the bathroom?         Keep the floor dry. Immediately clean up any water that is on the floor.  Remove soap buildup in the tub or shower regularly.  Use nonskid mats or decals on the floor of the tub or shower.  Attach bath mats securely with double-sided, nonslip rug tape.  If you need to sit down while you are in the shower, use a plastic, nonslip stool.  Install grab bars by the toilet and in the tub and shower. Do not use towel bars as grab bars.  What can I do in the bedroom?  Make sure that a bedside light is easy to reach.  Do not use oversized bedding that reaches the floor.  Have a firm chair that has side arms to use for getting dressed.  What can I do in the kitchen?  Clean up any spills right away.  If you need to reach for something above you,  use a sturdy step stool that has a grab bar.  Keep electrical cables out of the way.  Do not use floor polish or wax that makes floors slippery. If you must use wax, make sure that it is non-skid floor wax.  What can I do with my stairs?  Do not leave any items on the stairs.  Make sure that you have a light switch at the top and the bottom of the stairs. Have them installed if you do not have them.  Make sure that there are handrails on both sides of the stairs. Fix handrails that are broken or loose. Make sure that handrails are as long as the staircases.  Install non-slip stair treads on all stairs in your home.  Avoid having throw rugs at the top or bottom of stairs, or secure the rugs with carpet tape to prevent them from moving.  Choose a carpet design that does not hide the edge of steps on the stairs.  Check any carpeting to make sure that it is firmly attached to the stairs. Fix any carpet that is loose or worn.  What can I do on the outside of my home?  Use bright outdoor lighting.  Regularly repair the edges of walkways and driveways and fix any cracks.  Remove high doorway thresholds.  Trim any shrubbery on the main path into your home.  Regularly check that handrails are securely fastened and in good repair. Both sides of all steps should have handrails.  Install guardrails along the edges of any raised decks or porches.  Clear walkways of debris and clutter, including tools and rocks.  Have leaves, snow, and ice cleared regularly.  Use sand or salt on walkways during winter months.  In the garage, clean up any spills right away, including grease or oil spills.  What other actions can I take?  Wear closed-toe shoes that fit well and support your feet. Wear shoes that have rubber soles or low heels.  Use mobility aids as needed, such as canes, walkers, scooters, and crutches.  Review your medicines with your health care provider. Some medicines can cause dizziness or changes in blood pressure, which  increase your risk of falling.  Talk with your health care provider about other ways that you can decrease your risk of falls. This may include working with a physical therapist or  to improve your strength, balance, and endurance.  Where to find more information  Centers for Disease Control and PreventionNAHID: www.cdc.gov  National [place] on Aging: www.basim.nih.gov  Contact a health care provider if:  You are afraid of falling at home.  You feel weak, drowsy, or dizzy at home.  You fall at home.  Summary  There are many simple things that you can do to make your home safe and to help prevent falls.  Ways to make your home safe include removing tripping hazards and installing grab bars in the bathroom.  Ask for help when making these changes in your home.  This information is not intended to replace advice given to you by your health care provider. Make sure you discuss any questions you have with your health care provider.  Document Revised: 09/19/2022 Document Reviewed: 07/21/2021  [employer] Patient Education c 2023 [employer]    Sit-to-Stand Exercise    The sit-to-stand exercise (also known as the chair stand or chair rise exercise) strengthens your lower body and helps you maintain or improve your mobility and independence. The end goal is to do the sit-to-stand exercise without using your hands. This will be easier as you become stronger. You should always talk with your health care provider before starting any exercise program, especially if you have had recent surgery.  Do the exercise exactly as told by your health care provider and adjust it as directed. It is normal to feel mild stretching, pulling, tightness, or discomfort as you do this exercise, but you should stop right away if you feel sudden pain or your pain gets worse. Do not begin doing this exercise until told by your health care provider.  What the sit-to-stand exercise does  The sit-to-stand exercise helps to strengthen the  muscles in your thighs and the muscles in the center of your body that give you stability (core muscles). This exercise is especially helpful if:  You have had knee or hip surgery.  You have trouble getting up from a chair, out of a car, or off the toilet due to muscle weakness.  How to do the sit-to-stand exercise  Sit toward the front edge of a sturdy chair without armrests. Your knees should be bent and your feet should be flat on the floor and shoulder-width apart and underneath your hips.  Place your hands lightly on each side of the seat. Keep your back and neck as straight as possible, with your chest slightly forward.  Breathe in slowly. Lean forward and slightly shift your weight to the front of your feet.  Breathe out as you slowly stand up. Try not to support any weight with your hands.  Stand and pause for a full breath in and out.  Breathe in as you sit down slowly. Tighten your core and abdominal muscles to control your lowering as much as possible. You should lower yourself back to the chair slowly, not just drop back into the seat.  Breathe out slowly.  Do this exercise 10-15 times. If needed, do it fewer times until you build up strength.  Rest for 1 minute, then do another set of 10-15 repetitions.  To change the difficulty of the sit-to-stand exercise  If the exercise is too difficult, use a chair with sturdy armrests, and push off the armrests to help you come to the standing position. You can also use the armrests to help slowly lower yourself back to sitting. As this gets easier, try to use your arms less. You can also place a firm cushion or pillow on the chair to make the surface higher.  If this exercise is too easy, do not use your arms to help raise or lower yourself. You can also wear a weighted vest, use hand weights, increase your repetitions, or try a lower chair.  General tips  You may feel tired when starting an exercise routine. This is normal.  You may have muscle soreness that  lasts a few days. This is normal. As you get stronger, you may not feel muscle soreness.  Use smooth, steady movements.  Do not  hold your breath during strength exercises. This can cause unsafe changes in your blood pressure.  Breathe in slowly through your nose, and breathe out slowly through your mouth.  Summary  Strengthening your lower body is an important step to help you move safely and independently.  The sit-to-stand exercise helps strengthen the muscles in your thighs and core.  You should always talk with your health care provider before starting any exercise program, especially if you have had recent surgery.  This information is not intended to replace advice given to you by your health care provider. Make sure you discuss any questions you have with your health care provider.  Document Revised: 04/10/2022 Document Reviewed: 04/10/2022  [employer] Patient Education c  [employer]      Medicare Wellness  Personal Prevention Plan of Service     Date of Office Visit:    Encounter Provider:  Stepan Ragland DO  Place of Service:  [facility]  Patient Name: Tatyana Willett  :  1950    As part of the Medicare Wellness portion of your visit today, we are providing you with this personalized preventive plan of services (PPPS). This plan is based upon recommendations of the [place] Preventive Services Task Force (USPSTF) and the Advisory Committee on Immunization Practices (ACIP).    This lists the preventive care services that should be considered, and provides dates of when you are due. Items listed as completed are up-to-date and do not require any further intervention.    Health Maintenance   Topic Date Due    ANNUAL WELLNESS VISIT  2023    COVID-19 Vaccine (6 - Pfizer series) 2024 (Originally 2023)    COLORECTAL CANCER SCREENING  2023    INFLUENZA VACCINE  10/01/2023    LIPID PANEL  03/10/2024    DXA SCAN  2024     MAMMOGRAM  03/17/2025    PAP SMEAR  06/08/2026    TDAP/TD VACCINES (4 - Td or Tdap) 10/08/2028    HEPATITIS C SCREENING  Completed    Pneumococcal Vaccine 65+  Completed    ZOSTER VACCINE  Completed       No orders of the defined types were placed in this encounter.      No follow-ups on file.

## 2023-07-31 NOTE — PROGRESS NOTES
The ABCs of the Annual Wellness Visit  Subsequent Medicare Wellness Visit    Subjective      Tatyana Willett is a 73 y.o. female who presents for a Subsequent Medicare Wellness Visit.    The following portions of the patient's history were reviewed and   updated as appropriate: allergies, current medications, past family history, past medical history, past social history, past surgical history, and problem list.    Compared to one year ago, the patient feels her physical   health is the same.    Compared to one year ago, the patient feels her mental   health is the same.    Recent Hospitalizations:  This patient has had a [facility] admission record on file within the last 365 days.    Current Medical Providers:  Patient Care Team:  Stepan Ragland DO as PCP - General (Family Medicine)  Geeta Dickinson MD as Consulting Physician (Dermatology)  Ba Crews MD as Cardiologist (Cardiology)    Outpatient Medications Prior to Visit   Medication Sig Dispense Refill    albuterol sulfate  (90 Base) MCG/ACT inhaler Inhale 2 puffs Every 4 (Four) Hours As Needed for Wheezing or Shortness of Air. 18 g 1    aspirin 81 MG chewable tablet Chew 1 tablet Daily. 30 tablet 0    atorvastatin (LIPITOR) 40 MG tablet Take 1 tablet by mouth Every Night. 90 tablet 3    Calcium Carbonate-Vitamin D (CALTRATE 600+D PO) Take 1 tablet by mouth Daily.      fluticasone (FLONASE) 50 MCG/ACT nasal spray 1 spray into the nostril(s) as directed by provider Every Morning.      Glucosamine-Chondroit-Vit C-Mn (GLUCOSAMINE 1500 COMPLEX PO) Take 1 tablet by mouth daily.      loratadine (CLARITIN) 10 MG tablet Take 1 tablet by mouth Daily As Needed.      montelukast (SINGULAIR) 10 MG tablet TAKE ONE TABLET BY MOUTH DAILY AS NEEDED 90 tablet 1    Multiple Vitamins-Minerals (WOMENS ONE DAILY) tablet Take 1 tablet by mouth Daily.      vitamin C (ASCORBIC ACID) 500 MG tablet Take 1 tablet by mouth Daily.      Vitamin D,  "Cholecalciferol, 25 MCG (1000 UT) capsule Take 1 capsule by mouth Daily.       No facility-administered medications prior to visit.       No opioid medication identified on active medication list. I have reviewed chart for other potential  high risk medication/s and harmful drug interactions in the elderly.        Aspirin is on active medication list. Aspirin use is indicated based on review of current medical condition/s. Pros and cons of this therapy have been discussed today. Benefits of this medication outweigh potential harm.  Patient has been encouraged to continue taking this medication.  .      Patient Active Problem List   Diagnosis    Atopic rhinitis    Asthma    Ingrown toenail    Osteoarthritis of lumbar spine    Primary localized osteoarthrosis of hand    Scoliosis    Dyssomnia    Preventative health care    Low back pain    Throat pain    GERD (gastroesophageal reflux disease)    Family history of heart disease    Eosinophilic esophagitis    Acute arytenoiditis    Parasomnia in conditions classified elsewhere    ANNETTA (obstructive sleep apnea)    Vertigo    Aortic valve calcification    PFO (patent foramen ovale)    History of transient ischemic attack (TIA)    TIA (transient ischemic attack)     Advance Care Planning   Advance Care Planning     Advance Directive is on file.  ACP discussion was held with the patient during this visit. Patient has an advance directive in EMR which is still valid.      Objective    Vitals:    07/31/23 0810   BP: 118/62   BP Location: Left arm   Patient Position: Sitting   Cuff Size: Adult   Pulse: 80   Temp: 98.1 øF (36.7 øC)   TempSrc: Infrared   Weight: 62.9 kg (138 lb 9.6 oz)   Height: 165.1 cm (65\")   PainSc: 0-No pain     Estimated body mass index is 23.06 kg/mý as calculated from the following:    Height as of this encounter: 165.1 cm (65\").    Weight as of this encounter: 62.9 kg (138 lb 9.6 oz).    BMI is within normal parameters. No other follow-up for BMI " required.      Does the patient have evidence of cognitive impairment?   No  ATTENTION  What is the year: correct  What is the month of the year: correct  What is the day of the week?: correct  What is the date?: correct  MEMORY  Repeat address three times, only score third attempt: [address] [place], [place]: 7  HOW MANY ANIMALS DID THE PATIENT NAME  Verbal Fluency -- Animal Names (0-25): 22+  CLOCK DRAWING  Clock Drawing: All Correct  MEMORY RECALL  Tell me what you remember about that name and address we were repeating at the beginnin  ACE TOTAL SCORE  Total ACE Score - <25/30 strongly suggests cognitive impairment; <21/30 almost certainly shows dementia: 30   Lab Results   Component Value Date    TRIG 2023    HDL 77 (H) 2023    LDL 46 2023    VLDL 9 2023          HEALTH RISK ASSESSMENT    Smoking Status:  Social History     Tobacco Use   Smoking Status Never    Passive exposure: Never   Smokeless Tobacco Never     Alcohol Consumption:  Social History     Substance and Sexual Activity   Alcohol Use No     Fall Risk Screen:    NAHID Fall Risk Assessment was completed, and patient is at HIGH risk for falls. Assessment completed on:2023    Depression Screenin/31/2023     8:14 AM   PHQ-2/PHQ-9 Depression Screening   Little Interest or Pleasure in Doing Things 0-->not at all   Feeling Down, Depressed or Hopeless 0-->not at all   PHQ-9: Brief Depression Severity Measure Score 0       Health Habits and Functional and Cognitive Screenin/31/2023     8:15 AM   Functional & Cognitive Status   Do you have difficulty preparing food and eating? No   Do you have difficulty bathing yourself, getting dressed or grooming yourself? No   Do you have difficulty using the toilet? No   Do you have difficulty moving around from place to place? No   Do you have trouble with steps or getting out of a bed or a chair? No   Current Diet Well Balanced Diet   Dental  Exam Up to date   Eye Exam Up to date   Exercise (times per week) 6 times per week   Current Exercises Include Walking;Light Weights;Yard Work   Do you need help using the phone?  No   Are you deaf or do you have serious difficulty hearing?  Yes   Do you need help to go to places out of walking distance? No   Do you need help shopping? No   Do you need help preparing meals?  No   Do you need help with housework?  No   Do you need help with laundry? No   Do you need help taking your medications? No   Do you need help managing money? No   Do you ever drive or ride in a car without wearing a seat belt? No   Have you felt unusual stress, anger or loneliness in the last month? No   Who do you live with? Spouse   If you need help, do you have trouble finding someone available to you? No   Have you been bothered in the last four weeks by sexual problems? No   Do you have difficulty concentrating, remembering or making decisions? No       Age-appropriate Screening Schedule:  Refer to the list below for future screening recommendations based on patient's age, sex and/or medical conditions. Orders for these recommended tests are listed in the plan section. The patient has been provided with a written plan.    Health Maintenance   Topic Date Due    COLORECTAL CANCER SCREENING  08/04/2023    COVID-19 Vaccine (6 - Pfizer series) 06/23/2024 (Originally 1/20/2023)    INFLUENZA VACCINE  10/01/2023    DXA SCAN  03/30/2024    ANNUAL WELLNESS VISIT  07/31/2024    LIPID PANEL  07/31/2024    MAMMOGRAM  03/17/2025    PAP SMEAR  06/08/2026    TDAP/TD VACCINES (4 - Td or Tdap) 10/08/2028    HEPATITIS C SCREENING  Completed    Pneumococcal Vaccine 65+  Completed    ZOSTER VACCINE  Completed                  CMS Preventative Services Quick Reference  Risk Factors Identified During Encounter:    None Identified    The above risks/problems have been discussed with the patient.  Pertinent information has been shared with the patient in the  After Visit Summary.    Diagnoses and all orders for this visit:    1. Medicare annual wellness visit, subsequent (Primary)    2. At high risk for falls    3. Anxiety with flying  -     ALPRAZolam (Xanax) 0.25 MG tablet; Take 1 tab PO 30 minutes before flying for anxiety  Dispense: 4 tablet; Refill: 0    4. Vitamin D deficiency  -     Vitamin D,25-Hydroxy; Future    5. B12 deficiency  -     Vitamin B12; Future    6. Dyslipidemia  -     Lipid Panel; Future    7. Screening for deficiency anemia  -     CBC (No Diff); Future    8. Screening for endocrine disorder  -     Comprehensive Metabolic Panel; Future  -     Urinalysis With Microscopic If Indicated (No Culture) - Urine, Clean Catch; Future  -     TSH; Future      Problem List Items Addressed This Visit    None  Visit Diagnoses       Medicare annual wellness visit, subsequent    -  Primary    At high risk for falls        Anxiety with flying        Relevant Medications    ALPRAZolam (Xanax) 0.25 MG tablet    Vitamin D deficiency        Relevant Orders    Vitamin D,25-Hydroxy (Completed)    B12 deficiency        Relevant Orders    Vitamin B12 (Completed)    Dyslipidemia        Relevant Orders    Lipid Panel (Completed)    Screening for deficiency anemia        Relevant Orders    CBC (No Diff) (Completed)    Screening for endocrine disorder        Relevant Orders    Comprehensive Metabolic Panel (Completed)    Urinalysis With Microscopic If Indicated (No Culture) - Urine, Clean Catch (Completed)    TSH (Completed)          The wellness exam has been reviewed in detail.  The patient has been fully counseled on preventative guidelines for vaccines, cancer screenings, and other health maintenance needs.  Functional testing has been performed to assess capacity for independent living and need for other medical interventions.   The patient was counseled on maintaining a lifestyle to promote good health and to minimize chronic diseases.  The patient has been assisted with  scheduling healthcare procedures for the coming year and given a written document outlining these recommendations.      Follow Up:   Next Medicare Wellness visit to be scheduled in 1 year.      An After Visit Summary and PPPS were made available to the patient.

## 2023-08-10 ENCOUNTER — OFFICE VISIT (OUTPATIENT)
Dept: CARDIOLOGY | Facility: CLINIC | Age: 73
End: 2023-08-10
Payer: MEDICARE

## 2023-08-10 VITALS
BODY MASS INDEX: 23.49 KG/M2 | RESPIRATION RATE: 16 BRPM | OXYGEN SATURATION: 96 % | HEART RATE: 64 BPM | WEIGHT: 141 LBS | SYSTOLIC BLOOD PRESSURE: 118 MMHG | HEIGHT: 65 IN | DIASTOLIC BLOOD PRESSURE: 68 MMHG

## 2023-08-10 DIAGNOSIS — E78.2 MIXED HYPERLIPIDEMIA: ICD-10-CM

## 2023-08-10 DIAGNOSIS — G45.9 TIA (TRANSIENT ISCHEMIC ATTACK): ICD-10-CM

## 2023-08-10 DIAGNOSIS — Q21.12 PFO (PATENT FORAMEN OVALE): Primary | ICD-10-CM

## 2023-08-10 PROCEDURE — 1160F RVW MEDS BY RX/DR IN RCRD: CPT | Performed by: INTERNAL MEDICINE

## 2023-08-10 PROCEDURE — 99213 OFFICE O/P EST LOW 20 MIN: CPT | Performed by: INTERNAL MEDICINE

## 2023-08-10 PROCEDURE — 1159F MED LIST DOCD IN RCRD: CPT | Performed by: INTERNAL MEDICINE

## 2023-08-10 NOTE — PROGRESS NOTES
Follow-up Visit      Date: 08/10/2023  Patient Name: Tatyana Willett  : 1950   MRN: [id number]     Chief Complaint:    Chief Complaint   Patient presents with    PFO     6 Month follow up       History of Present Illness: Tatyana Willett is a 72 y.o. female who is here today for follow-up regarding her PFO after having a TIA earlier this year.  Patient underwent ERON since her last follow-up in the clinic.  This did show a PFO but the location was hard to determine.      Patient has been doing fine.  Patient denies any chest pain any shortness of breath any dizziness any palpitations or any other symptoms.  Her symptoms of possible TIA was also not very clear.  At this time I believe we will not continue treating her with medications.  Problem List     CARDIAC  Coronary Artery Disease:   Asymptomatic     Myocardium:   Echo 2023: EF 66-70%  ERON 2023: EF 56-60% positive bubble but location difficult to determine     Valvular:   Trace to mild MR, mild TR     Electrical:   Normal sinus rhythm     Percardium:   Normal     VASCULAR:  Arterial  Cerebrovascular disease:   Possible TIA s/p tPA MRI brain 2023: Normal study     VENOUS  Venous duplex 2023: No DVT bilaterally       CARDIAC RISK FACTORS:  Obstructive Sleep Apnea     NON-CARDIAC:  Eosinophilic esophagitis  Asthma  Vertigo     SURGERIES:  Tonsillectomy  Basal cell carcinoma excision  Squamous cell carcinoma excision         Subjective      ROS  Review of systems was performed and pertinent positives and negatives are noted in the HPI.      Current Outpatient Medications:     albuterol sulfate  (90 Base) MCG/ACT inhaler, Inhale 2 puffs Every 4 (Four) Hours As Needed for Wheezing or Shortness of Air., Disp: 18 g, Rfl: 1    ALPRAZolam (Xanax) 0.25 MG tablet, Take 1 tab PO 30 minutes before flying for anxiety, Disp: 4 tablet, Rfl: 0    aspirin 81 MG chewable tablet, Chew 1 tablet Daily., Disp: 30 tablet, Rfl: 0    atorvastatin  "(LIPITOR) 40 MG tablet, Take 1 tablet by mouth Every Night., Disp: 90 tablet, Rfl: 3    Calcium Carbonate-Vitamin D (CALTRATE 600+D PO), Take 1 tablet by mouth Daily., Disp: , Rfl:     fluticasone (FLONASE) 50 MCG/ACT nasal spray, 1 spray into the nostril(s) as directed by provider Every Morning., Disp: , Rfl:     Glucosamine-Chondroit-Vit C-Mn (GLUCOSAMINE 1500 COMPLEX PO), Take 1 tablet by mouth daily., Disp: , Rfl:     loratadine (CLARITIN) 10 MG tablet, Take 1 tablet by mouth Daily As Needed., Disp: , Rfl:     montelukast (SINGULAIR) 10 MG tablet, TAKE ONE TABLET BY MOUTH DAILY AS NEEDED, Disp: 90 tablet, Rfl: 1    Multiple Vitamins-Minerals (WOMENS ONE DAILY) tablet, Take 1 tablet by mouth Daily., Disp: , Rfl:     vitamin C (ASCORBIC ACID) 500 MG tablet, Take 1 tablet by mouth Daily., Disp: , Rfl:     Vitamin D, Cholecalciferol, 25 MCG (1000 UT) capsule, Take 1 capsule by mouth Daily., Disp: , Rfl:      Allergies   Allergen Reactions    Shellfish Allergy Hives     oysters    Poison Ivy Extract [Poison Ivy Extract] Rash       Objective     Vitals:    08/10/23 1046   BP: 118/68   BP Location: Right arm   Patient Position: Sitting   Cuff Size: Adult   Pulse: 64   Resp: 16   SpO2: 96%   Weight: 64 kg (141 lb)   Height: 165.1 cm (65\")     Body mass index is 23.46 kg/mý.    Physical Exam   Constitutional: Well-developed, well-nourished, no acute distress  HENT: Normocephalic, atraumatic moist oral mucosa  Neck: Neck supple. No JVD present. No carotid bruits.   Cardiovascular: Regular rate, regular rhythm and normal heart sounds. No murmur heard.   Pulmonary/Chest: Clear to auscultation bilaterally without wheezing, rhonchi, or rails  Abdominal: Nondistended, nontender.  Musculoskeletal: No obvious deformity  Neurological: Alert and oriented x3, no focal deficits  Extremities: No peripheral edema, palpable DP pulses bilaterally    Lab Review:   Lab Results   Component Value Date    GLUCOSE 90 07/31/2023    BUN 14 " 07/31/2023    CREATININE 0.78 07/31/2023    EGFRIFNONA 72 06/03/2021    BCR 17.9 07/31/2023    K 4.1 07/31/2023    CO2 27.8 07/31/2023    CALCIUM 9.8 07/31/2023    ALBUMIN 4.6 07/31/2023    AST 26 07/31/2023    ALT 29 07/31/2023     Lab Results   Component Value Date    WBC 4.28 07/31/2023    HGB 13.8 07/31/2023    HCT 41.4 07/31/2023    MCV 89.4 07/31/2023     07/31/2023     Lab Results   Component Value Date    CHOL 132 07/31/2023    CHLPL 157 06/13/2014    TRIG 31 07/31/2023    HDL 77 (H) 07/31/2023    LDL 46 07/31/2023     Lab Results   Component Value Date    TSH 3.340 07/31/2023     Lab Results   Component Value Date    HGBA1C 4.90 01/16/2023         Smoking Cessation:   She never smoked      Assessment / Plan    Assessment:  1. PFO (patent foramen ovale)    2. TIA (transient ischemic attack)    3. Mixed hyperlipidemia           Plan:  Patient is going to continue her current medications.  Her cholesterol has been under good control.  2.   Patient cholesterol has been under good control.  She is going to continue with current medications and we will see her back on a yearly basis.    Follow Up    Return in about 1 year (around 8/10/2024), or Nesha.    Ba Crews MD

## 2023-09-18 ENCOUNTER — OFFICE VISIT (OUTPATIENT)
Dept: FAMILY MEDICINE CLINIC | Facility: CLINIC | Age: 73
End: 2023-09-18
Payer: MEDICARE

## 2023-09-18 VITALS
DIASTOLIC BLOOD PRESSURE: 78 MMHG | HEIGHT: 65 IN | SYSTOLIC BLOOD PRESSURE: 122 MMHG | BODY MASS INDEX: 23.49 KG/M2 | WEIGHT: 141 LBS

## 2023-09-18 DIAGNOSIS — M25.461 EFFUSION OF RIGHT KNEE JOINT: Primary | ICD-10-CM

## 2023-09-18 DIAGNOSIS — M25.561 ACUTE PAIN OF RIGHT KNEE: ICD-10-CM

## 2023-09-18 PROCEDURE — 99213 OFFICE O/P EST LOW 20 MIN: CPT | Performed by: FAMILY MEDICINE

## 2023-09-18 RX ORDER — MELOXICAM 15 MG/1
15 TABLET ORAL DAILY
Qty: 21 TABLET | Refills: 0 | Status: SHIPPED | OUTPATIENT
Start: 2023-09-18 | End: 2023-10-09

## 2023-09-18 NOTE — PROGRESS NOTES
Established Patient Office Visit      Patient Name: Tatyana Willett  : 1950   MRN: [id number]   Care Team: Patient Care Team:  Stepan Ragland DO as PCP - General (Family Medicine)  Geeta Dickinson MD as Consulting Physician (Dermatology)  Ba Crews MD as Cardiologist (Cardiology)    Chief Complaint:    Chief Complaint   Patient presents with    Knee Pain     Pt complains right knee pain for approx 1 week       History of Present Illness: Tatyana Willett is a 73 y.o. female who is here today for chief complaint.    HPI    Pain in right leg started during trip    Right knee had pain starting yesterday when she shot a free throw at home.    This patient is accompanied by their self who contributes to the history of their care.    The following portions of the patient's history were reviewed and updated as appropriate: allergies, current medications, past family history, past medical history, past social history, past surgical history and problem list.    Subjective      Review of Systems:   Review of Systems - See HPI    Past Medical History:   Past Medical History:   Diagnosis Date    Allergic     Allergic rhinitis Lifelong    Moderately severe with nasal polyps    Asthma 2011    Severe flare with bronchoscopy from pine needle exposure    CTS (carpal tunnel syndrome) 2004    Bilateral    Eosinophilic esophagitis 2018    Biopsy-positive    GERD (gastroesophageal reflux disease) 2016    Hyperlipidemia     Lumbar scoliosis 2013    Recurrent discomfort    Lumbar spinal stenosis 2013    Migraine 2004    hospitalized    Mononucleosis 1962    Osteoarthritis of hands, bilateral     PFO (patent foramen ovale) 2/15/2023    Post menopausal syndrome 2000    Hot flashes with impaired sleep    Sleep apnea 2018    Sleep disorder     TIA (transient ischemic attack) 8/10/2023    Varicose vein of leg 1990    venous ligation left leg       Past Surgical History:   Past Surgical History:   Procedure  Laterality Date    BRONCHOSCOPY      Severe allergic pneumonitis    COLONOSCOPY  2013    Benign study    FRACTURE SURGERY  2009    left pinkie finger    LARYNGOSCOPY  2017    Indirect exam    SKIN SURGERY      BASIL CELL    SQUAMOUS CELL CARCINOMA EXCISION Left     L leg    TONSILLECTOMY      TONSILLECTOMY      VEIN SURGERY Left     Ligation of left leg       Family History:   Family History   Problem Relation Age of Onset    Esophageal cancer Father           age 60    Cancer Father         Keller's esophagus    Dementia Mother     Hearing loss Mother     Osteoarthritis Mother     Achalasia Brother     Osteoarthritis Brother     Pancreatic cancer Brother     Osteoarthritis Brother         Bilateral hip replacements    Heart attack Paternal Grandfather          age 65    Heart disease Paternal Grandfather     Heart failure Paternal Grandmother     Pancreatic cancer Maternal Grandmother     Cancer Maternal Grandmother         Pancreatic    Breast cancer Neg Hx     Ovarian cancer Neg Hx     Uterine cancer Neg Hx     Colon cancer Neg Hx        Social History:   Social History     Socioeconomic History    Marital status:    Tobacco Use    Smoking status: Never     Passive exposure: Never    Smokeless tobacco: Never   Vaping Use    Vaping Use: Never used   Substance and Sexual Activity    Alcohol use: No    Drug use: No    Sexual activity: Yes     Partners: Male     Birth control/protection: Post-menopausal       Tobacco History:   Social History     Tobacco Use   Smoking Status Never    Passive exposure: Never   Smokeless Tobacco Never       Medications:     Current Outpatient Medications:     albuterol sulfate  (90 Base) MCG/ACT inhaler, Inhale 2 puffs Every 4 (Four) Hours As Needed for Wheezing or Shortness of Air., Disp: 18 g, Rfl: 1    ALPRAZolam (Xanax) 0.25 MG tablet, Take 1 tab PO 30 minutes before flying for anxiety, Disp: 4 tablet, Rfl: 0    aspirin 81 MG chewable tablet, Chew  "1 tablet Daily., Disp: 30 tablet, Rfl: 0    atorvastatin (LIPITOR) 40 MG tablet, Take 1 tablet by mouth Every Night., Disp: 90 tablet, Rfl: 3    Calcium Carbonate-Vitamin D (CALTRATE 600+D PO), Take 1 tablet by mouth Daily., Disp: , Rfl:     fluticasone (FLONASE) 50 MCG/ACT nasal spray, 1 spray into the nostril(s) as directed by provider Every Morning., Disp: , Rfl:     Glucosamine-Chondroit-Vit C-Mn (GLUCOSAMINE 1500 COMPLEX PO), Take 1 tablet by mouth daily., Disp: , Rfl:     loratadine (CLARITIN) 10 MG tablet, Take 1 tablet by mouth Daily As Needed., Disp: , Rfl:     montelukast (SINGULAIR) 10 MG tablet, TAKE ONE TABLET BY MOUTH DAILY AS NEEDED, Disp: 90 tablet, Rfl: 1    Multiple Vitamins-Minerals (WOMENS ONE DAILY) tablet, Take 1 tablet by mouth Daily., Disp: , Rfl:     vitamin C (ASCORBIC ACID) 500 MG tablet, Take 1 tablet by mouth Daily., Disp: , Rfl:     Vitamin D, Cholecalciferol, 25 MCG (1000 UT) capsule, Take 1 capsule by mouth Daily., Disp: , Rfl:     meloxicam (MOBIC) 15 MG tablet, Take 1 tablet by mouth Daily for 21 days., Disp: 21 tablet, Rfl: 0    Allergies:   Allergies   Allergen Reactions    Shellfish Allergy Hives     oysters    Poison Ivy Extract [Poison Ivy Extract] Rash       Objective   Objective     Physical Exam:  Vital Signs:   Vitals:    09/18/23 1008   BP: 122/78   BP Location: Left arm   Patient Position: Sitting   Cuff Size: Adult   Weight: 64 kg (141 lb)   Height: 165.1 cm (65\")     Body mass index is 23.46 kg/m².     Physical Exam  Nursing note reviewed  Const: NAD, A&Ox4, Pleasant, Cooperative  Eyes: EOMI, no conjunctivitis  ENT: No nasal discharge present, neck supple  Cardiac: Regular rate and rhythm, no cyanosis  Resp: Respiratory rate within normal limits, no increased work of breathing, no audible wheezing or retractions noted  GI: No distention or ascites  MSK: Motor and sensation grossly intact in bilateral upper extremities  Neurologic: CN II-XII grossly intact  Psych: " Appropriate mood and behavior.  Skin: Warm, dry  Procedures/Radiology     Procedures  No radiology results for the last 7 days     Assessment & Plan   Assessment / Plan      Assessment/Plan:   Problems Addressed This Visit  Diagnoses and all orders for this visit:    1. Effusion of right knee joint (Primary)  -     MRI Knee Right Without Contrast; Future  -     Ambulatory Referral to Orthopedic Surgery  -     meloxicam (MOBIC) 15 MG tablet; Take 1 tablet by mouth Daily for 21 days.  Dispense: 21 tablet; Refill: 0    2. Acute pain of right knee  -     MRI Knee Right Without Contrast; Future  -     Ambulatory Referral to Orthopedic Surgery  -     meloxicam (MOBIC) 15 MG tablet; Take 1 tablet by mouth Daily for 21 days.  Dispense: 21 tablet; Refill: 0      Problem List Items Addressed This Visit    None  Visit Diagnoses       Effusion of right knee joint    -  Primary    Relevant Medications    meloxicam (MOBIC) 15 MG tablet    Other Relevant Orders    MRI Knee Right Without Contrast    Ambulatory Referral to Orthopedic Surgery    Acute pain of right knee        Relevant Medications    meloxicam (MOBIC) 15 MG tablet    Other Relevant Orders    MRI Knee Right Without Contrast    Ambulatory Referral to Orthopedic Surgery            There are no Patient Instructions on file for this visit.    Follow Up:   No follow-ups on file.        DO GREGG Mast RD  [facility]  [address]  [facility] [phone]  Fax [phone]  Phone [phone]

## 2023-09-29 ENCOUNTER — TELEPHONE (OUTPATIENT)
Dept: FAMILY MEDICINE CLINIC | Facility: CLINIC | Age: 73
End: 2023-09-29
Payer: MEDICARE

## 2023-09-29 NOTE — TELEPHONE ENCOUNTER
If they can reschedule it for a few days out that would be good, try to see if we can get the results by Monday

## 2023-09-29 NOTE — TELEPHONE ENCOUNTER
Attempted to contact patient to relay Dr. Ragland message    If they can reschedule it for a few days out that would be good,      LVM with office # given.     Hub may relay message and document.

## 2023-09-29 NOTE — TELEPHONE ENCOUNTER
Caller: Tatyana Willett    Relationship: Self    Best call back number: [phone]    Caller requesting test results: SELF    What test was performed: MRI OF RIGHT KNEE    When was the test performed: 09/28/2023    Additional notes: PATIENT HAS APPOINTMENT WITH PHYSICAL THERAPY ON MONDAY HOWEVER DOES NOT HAVE THE RESULTS YET OF MRI AND WANTED TO KNOW IF SHE SHOULD KEEP THAT APPOINTMENT? PLEASE ADVISE

## 2023-10-02 ENCOUNTER — TELEPHONE (OUTPATIENT)
Dept: FAMILY MEDICINE CLINIC | Facility: CLINIC | Age: 73
End: 2023-10-02
Payer: MEDICARE

## 2023-10-02 NOTE — TELEPHONE ENCOUNTER
Caller: Tatyana Willett    Relationship: Self    Best call back number: [phone]     What is the best time to reach you: ANYTIME    Who are you requesting to speak with (clinical staff, provider,  specific staff member): PCP/MA        What was the call regarding: PATIENT REQUEST A CALLBACK WITH KNEE MRI RESULTS    Is it okay if the provider responds through MyChart: CALLBACK

## 2023-10-03 ENCOUNTER — OFFICE VISIT (OUTPATIENT)
Dept: ORTHOPEDIC SURGERY | Facility: CLINIC | Age: 73
End: 2023-10-03
Payer: MEDICARE

## 2023-10-03 VITALS
DIASTOLIC BLOOD PRESSURE: 80 MMHG | BODY MASS INDEX: 22.99 KG/M2 | WEIGHT: 138 LBS | SYSTOLIC BLOOD PRESSURE: 124 MMHG | HEIGHT: 65 IN

## 2023-10-03 DIAGNOSIS — M25.561 RIGHT KNEE PAIN, UNSPECIFIED CHRONICITY: ICD-10-CM

## 2023-10-03 DIAGNOSIS — M17.11 PRIMARY OSTEOARTHRITIS OF RIGHT KNEE: Primary | ICD-10-CM

## 2023-10-03 DIAGNOSIS — S83.231A COMPLEX TEAR OF MEDIAL MENISCUS OF RIGHT KNEE AS CURRENT INJURY, INITIAL ENCOUNTER: ICD-10-CM

## 2023-10-03 RX ORDER — BUPIVACAINE HYDROCHLORIDE 2.5 MG/ML
1 INJECTION, SOLUTION EPIDURAL; INFILTRATION; INTRACAUDAL
Status: COMPLETED | OUTPATIENT
Start: 2023-10-03 | End: 2023-10-03

## 2023-10-03 RX ORDER — TRIAMCINOLONE ACETONIDE 40 MG/ML
80 INJECTION, SUSPENSION INTRA-ARTICULAR; INTRAMUSCULAR
Status: COMPLETED | OUTPATIENT
Start: 2023-10-03 | End: 2023-10-03

## 2023-10-03 RX ORDER — LIDOCAINE HYDROCHLORIDE 10 MG/ML
3 INJECTION, SOLUTION EPIDURAL; INFILTRATION; INTRACAUDAL; PERINEURAL
Status: COMPLETED | OUTPATIENT
Start: 2023-10-03 | End: 2023-10-03

## 2023-10-03 RX ADMIN — TRIAMCINOLONE ACETONIDE 80 MG: 40 INJECTION, SUSPENSION INTRA-ARTICULAR; INTRAMUSCULAR at 10:38

## 2023-10-03 RX ADMIN — BUPIVACAINE HYDROCHLORIDE 1 ML: 2.5 INJECTION, SOLUTION EPIDURAL; INFILTRATION; INTRACAUDAL at 10:38

## 2023-10-03 RX ADMIN — LIDOCAINE HYDROCHLORIDE 3 ML: 10 INJECTION, SOLUTION EPIDURAL; INFILTRATION; INTRACAUDAL; PERINEURAL at 10:38

## 2023-10-03 NOTE — PROGRESS NOTES
Orthopaedic Clinic Note: Knee New Patient    Chief Complaint   Patient presents with    Right Knee - Pain        HPI    Tatyana Willett is a 73 y.o. female who presents with right knee pain for 3 week(s). Onset twisting injury. Pain is localized to the anterior knee with some medial knee pain and is a 5/10 on the pain scale. Pain is described as aching. Associated symptoms include pain, stiffness, and giving way/buckling. The pain is worse with walking, sitting, climbing stairs, sleeping, lying on affected side, and rising from seated position; resting, pain medication and/or NSAID, and elevating the extremity make it better. Previous treatments have included: bracing and NSAIDS since symptom onset. Although some transient relief was reported with these interventions, these conservative measures have failed and symptoms have persisted. The patient is limited in daily activities and has had a significant decrease in quality of life as a result. She denies  fever or chills.    I have reviewed the following portions of the patient's history:History of Present Illness    Past Medical History:   Diagnosis Date    Allergic     Allergic rhinitis Lifelong    Moderately severe with nasal polyps    Asthma 2011    Severe flare with bronchoscopy from pine needle exposure    CTS (carpal tunnel syndrome) 2004    Bilateral    Eosinophilic esophagitis 2018    Biopsy-positive    Fracture, finger 2009    left pinkie    GERD (gastroesophageal reflux disease) 2016    Hyperlipidemia     Knee swelling     Lumbar scoliosis 2013    Recurrent discomfort    Lumbar spinal stenosis 2013    Migraine 2004    hospitalized    Mononucleosis 1962    Osteoarthritis of hands, bilateral     PFO (patent foramen ovale) 02/15/2023    Post menopausal syndrome 2000    Hot flashes with impaired sleep    Sleep apnea 2018    Sleep disorder     TIA (transient ischemic attack) 08/10/2023    Varicose vein of leg 1990    venous ligation left leg      Past  Surgical History:   Procedure Laterality Date    BRONCHOSCOPY      Severe allergic pneumonitis    COLONOSCOPY  2013    Benign study    FRACTURE SURGERY  2009    left pinkie finger    HAND SURGERY  2009    left pinkie    LARYNGOSCOPY  2017    Indirect exam    SKIN SURGERY      BASIL CELL    SQUAMOUS CELL CARCINOMA EXCISION Left     L leg    TONSILLECTOMY      TONSILLECTOMY      VEIN SURGERY Left     Ligation of left leg      Family History   Problem Relation Age of Onset    Esophageal cancer Father           age 60    Cancer Father         Keller's esophagus    Dementia Mother     Hearing loss Mother     Osteoarthritis Mother     Achalasia Brother     Osteoarthritis Brother     Pancreatic cancer Brother     Osteoarthritis Brother         Bilateral hip replacements    Heart attack Paternal Grandfather          age 65    Heart disease Paternal Grandfather     Heart failure Paternal Grandmother     Pancreatic cancer Maternal Grandmother     Cancer Maternal Grandmother         Pancreatic    Breast cancer Neg Hx     Ovarian cancer Neg Hx     Uterine cancer Neg Hx     Colon cancer Neg Hx      Social History     Socioeconomic History    Marital status:    Tobacco Use    Smoking status: Never     Passive exposure: Never    Smokeless tobacco: Never   Vaping Use    Vaping Use: Never used   Substance and Sexual Activity    Alcohol use: No    Drug use: Never    Sexual activity: Yes     Partners: Male     Birth control/protection: Post-menopausal      Current Outpatient Medications on File Prior to Visit   Medication Sig Dispense Refill    albuterol sulfate  (90 Base) MCG/ACT inhaler Inhale 2 puffs Every 4 (Four) Hours As Needed for Wheezing or Shortness of Air. 18 g 1    aspirin 81 MG chewable tablet Chew 1 tablet Daily. 30 tablet 0    atorvastatin (LIPITOR) 40 MG tablet Take 1 tablet by mouth Every Night. 90 tablet 3    Calcium Carbonate-Vitamin D (CALTRATE 600+D PO) Take 1 tablet by mouth  "Daily.      fluticasone (FLONASE) 50 MCG/ACT nasal spray 1 spray into the nostril(s) as directed by provider Every Morning.      Glucosamine-Chondroit-Vit C-Mn (GLUCOSAMINE 1500 COMPLEX PO) Take 1 tablet by mouth daily.      loratadine (CLARITIN) 10 MG tablet Take 1 tablet by mouth Daily As Needed.      meloxicam (MOBIC) 15 MG tablet Take 1 tablet by mouth Daily for 21 days. 21 tablet 0    montelukast (SINGULAIR) 10 MG tablet TAKE ONE TABLET BY MOUTH DAILY AS NEEDED 90 tablet 1    Multiple Vitamins-Minerals (WOMENS ONE DAILY) tablet Take 1 tablet by mouth Daily.      vitamin C (ASCORBIC ACID) 500 MG tablet Take 1 tablet by mouth Daily.      Vitamin D, Cholecalciferol, 25 MCG (1000 UT) capsule Take 1 capsule by mouth Daily.      [DISCONTINUED] ALPRAZolam (Xanax) 0.25 MG tablet Take 1 tab PO 30 minutes before flying for anxiety 4 tablet 0     No current facility-administered medications on file prior to visit.      Allergies   Allergen Reactions    Shellfish Allergy Hives     oysters    Poison Ivy Extract [Poison Ivy Extract] Rash        Review of Systems   Constitutional: Negative.    HENT: Negative.     Eyes: Negative.    Respiratory: Negative.     Cardiovascular: Negative.    Gastrointestinal: Negative.    Endocrine: Negative.    Genitourinary: Negative.    Musculoskeletal:  Positive for arthralgias.   Skin: Negative.    Allergic/Immunologic: Negative.    Neurological: Negative.    Hematological: Negative.    Psychiatric/Behavioral: Negative.        The patient's Review of Systems was personally reviewed and confirmed as accurate.    The following portions of the patient's history were reviewed and updated as appropriate: allergies, current medications, past family history, past medical history, past social history, past surgical history, and problem list.    Physical Exam  Blood pressure 124/80, height 165.1 cm (65\"), weight 62.6 kg (138 lb), not currently breastfeeding.    Body mass index is 22.96 " kg/m².    GENERAL APPEARANCE: awake, alert & oriented x 3, in no acute distress and well developed, well nourished  PSYCH: normal affect  LUNGS:  breathing nonlabored  EYES: sclera anicteric  CARDIOVASCULAR: palpable dorsalis pedis, palpable posterior tibial bilaterally. Capillary refill less than 2 seconds  EXTREMITIES: no clubbing, cyanosis  GAIT:  Antalgic            Right Lower Extremity Exam:   ----------  Hip Exam  ----------  FLEXION CONTRACTURE: None  FLEXION: 110 degrees  INTERNAL ROTATION: 20 degrees at 90 degrees of flexion   EXTERNAL ROTATION: 40 degrees at 90 degrees of flexion    PAIN WITH HIP MOTION: no  ----------  Knee Exam  ----------  ALIGNMENT: Mild varus correctable to neutral    RANGE OF MOTION:  Normal (0-120 degrees) with no extensor lag or flexion contracture  LIGAMENTOUS STABILITY:   stable to varus and valgus stress at terminal extension and 30 degrees without any evidence of laxity     STRENGTH:  5/5 knee flexion, extension. 5/5 ankle dorsiflexion and plantarflexion.     PAIN WITH PALPATION: Tender about anterior knee, trace medial joint line pain, no lateral joint line pain  KNEE EFFUSION: no  PAIN WITH KNEE ROM: no  PATELLAR CREPITUS: yes, asymptomatic  SPECIAL EXAM FINDINGS:  Negative patellar compression    REFLEXES:  PATELLAR 2+/4  ACHILLES 2+/4    CLONUS: negative  STRAIGHT LEG TEST:   negative    SENSATION TO LIGHT TOUCH:  DEEP PERONEAL/SUPERFICIAL PERONEAL/SURAL/SAPHENOUS/TIBIAL:   intact    EDEMA:  no  ERYTHEMA:  no  WOUNDS/INCISIONS: no overlying skin problems.      Left Lower Extremity Exam:   ----------  Hip Exam  ----------  FLEXION CONTRACTURE: None  FLEXION: 110 degrees  INTERNAL ROTATION: 20 degrees at 90 degrees of flexion   EXTERNAL ROTATION: 40 degrees at 90 degrees of flexion    PAIN WITH HIP MOTION: no  ----------  Knee Exam  ----------  ALIGNMENT: neutral, no varus or valgus deformity     RANGE OF MOTION:  Normal (0-120 degrees) with no extensor lag or flexion  contracture  LIGAMENTOUS STABILITY:   stable to varus and valgus stress at terminal extension and 30 degrees without any evidence of laxity     STRENGTH:  5/5 knee flexion, extension. 5/5 ankle dorsiflexion and plantarflexion.     PAIN WITH PALPATION: denies tenderness to palpation about the knee, denies medial or lateral joint line pain  KNEE EFFUSION: no  PAIN WITH KNEE ROM: no  PATELLAR CREPITUS: yes, asymptomatic  SPECIAL EXAM FINDINGS:  Negative patellar compression    REFLEXES:  PATELLAR 2+/4  ACHILLES 2+/4    CLONUS: negative  STRAIGHT LEG TEST:   negative    SENSATION TO LIGHT TOUCH:  DEEP PERONEAL/SUPERFICIAL PERONEAL/SURAL/SAPHENOUS/TIBIAL:   intact    EDEMA:  no  ERYTHEMA:  no  WOUNDS/INCISIONS: no overlying skin problems.    ______________________________________________________________________  ______________________________________________________________________    RADIOGRAPHIC FINDINGS:   Indication: Right knee pain    Comparison: No prior xrays are available for comparison    Right knee(s) 4 views: moderate tricompartmental osteoarthritis with neutral alignment.  Small peritubular osteophytes visualized in all compartments.  No acute bony injury or fracture.    MRI from 9/28/2023 was personally interpreted.  MRI reveals tears of the medial and lateral menisci with small joint effusion.  Moderate tricompartmental degenerative changes are identified.  No acute bony injury or fracture.      Assessment/Plan:   Diagnosis Plan   1. Primary osteoarthritis of right knee        2. Right knee pain, unspecified chronicity  XR Knee 4+ View Right      3. Complex tear of medial meniscus of right knee as current injury, initial encounter          I reviewed the MRI findings as well has x-ray findings with the patient.  She appears to be suffering primarily from patellofemoral arthritis.  She does have meniscal pathology which correlates slightly with her medial joint line pain.  I recommend treating her arthritis  symptoms and then determining what symptoms remain if any as to whether or not addressing the meniscus tear is warranted.  She expresses understanding.  We will proceed with cortisone injection of the right knee today.  She will follow-up as needed.    Procedure Note:  I discussed with the patient the potential benefits of performing a therapeutic injection of the right knee as well as potential risks including but not limited to infection, swelling, pain, bleeding, bruising, nerve/vessel damage, skin color changes, transient elevation in blood glucose levels, and fat atrophy. After informed consent and verifying correct patient, procedure site, and type of procedure, the area was prepped with alcohol, ethyl chloride was used to numb the skin. Via the superior lateral approach, 3 cc of 1% lidocaine, 1 cc of 0.5% bupivicaine, and 2 cc of 40mg/ml of Kenalog were injected into the right knee. The patient tolerated the procedure well. There were no complications. A sterile dressing was placed over the injection site.    Silvio Isaacs MD  10/03/23  10:41 EDT

## 2023-10-03 NOTE — PROGRESS NOTES
Procedure   - Large Joint Arthrocentesis: R knee on 10/3/2023 10:38 AM  Indications: pain  Details: 21 G needle, anterolateral approach  Medications: 1 mL bupivacaine (PF) 0.25 %; 3 mL lidocaine PF 1% 1 %; 80 mg triamcinolone acetonide 40 MG/ML  Outcome: tolerated well, no immediate complications  Procedure, treatment alternatives, risks and benefits explained, specific risks discussed. Consent was given by the patient. Immediately prior to procedure a time out was called to verify the correct patient, procedure, equipment, support staff and site/side marked as required. Patient was prepped and draped in the usual sterile fashion.

## 2023-10-17 DIAGNOSIS — J45.20 MILD INTERMITTENT ASTHMA WITHOUT COMPLICATION: ICD-10-CM

## 2023-10-17 DIAGNOSIS — J30.2 SEASONAL ALLERGIC RHINITIS, UNSPECIFIED TRIGGER: ICD-10-CM

## 2023-10-17 RX ORDER — MONTELUKAST SODIUM 10 MG/1
TABLET ORAL
Qty: 90 TABLET | Refills: 1 | Status: SHIPPED | OUTPATIENT
Start: 2023-10-17

## 2023-10-26 ENCOUNTER — OFFICE VISIT (OUTPATIENT)
Dept: ORTHOPEDIC SURGERY | Facility: CLINIC | Age: 73
End: 2023-10-26
Payer: MEDICARE

## 2023-10-26 VITALS
DIASTOLIC BLOOD PRESSURE: 72 MMHG | SYSTOLIC BLOOD PRESSURE: 120 MMHG | WEIGHT: 138 LBS | HEIGHT: 65 IN | BODY MASS INDEX: 22.99 KG/M2

## 2023-10-26 DIAGNOSIS — S83.231A COMPLEX TEAR OF MEDIAL MENISCUS OF RIGHT KNEE AS CURRENT INJURY, INITIAL ENCOUNTER: ICD-10-CM

## 2023-10-26 DIAGNOSIS — M17.11 PRIMARY OSTEOARTHRITIS OF RIGHT KNEE: ICD-10-CM

## 2023-10-26 DIAGNOSIS — M70.50 PES ANSERINE BURSITIS: Primary | ICD-10-CM

## 2023-10-26 RX ORDER — LIDOCAINE HYDROCHLORIDE 10 MG/ML
2 INJECTION, SOLUTION EPIDURAL; INFILTRATION; INTRACAUDAL; PERINEURAL
Status: COMPLETED | OUTPATIENT
Start: 2023-10-26 | End: 2023-10-26

## 2023-10-26 RX ORDER — BUPIVACAINE HYDROCHLORIDE 5 MG/ML
1 INJECTION, SOLUTION EPIDURAL; INTRACAUDAL
Status: COMPLETED | OUTPATIENT
Start: 2023-10-26 | End: 2023-10-26

## 2023-10-26 RX ORDER — TRIAMCINOLONE ACETONIDE 40 MG/ML
40 INJECTION, SUSPENSION INTRA-ARTICULAR; INTRAMUSCULAR
Status: COMPLETED | OUTPATIENT
Start: 2023-10-26 | End: 2023-10-26

## 2023-10-26 RX ADMIN — LIDOCAINE HYDROCHLORIDE 2 ML: 10 INJECTION, SOLUTION EPIDURAL; INFILTRATION; INTRACAUDAL; PERINEURAL at 09:58

## 2023-10-26 RX ADMIN — TRIAMCINOLONE ACETONIDE 40 MG: 40 INJECTION, SUSPENSION INTRA-ARTICULAR; INTRAMUSCULAR at 09:58

## 2023-10-26 RX ADMIN — BUPIVACAINE HYDROCHLORIDE 1 ML: 5 INJECTION, SOLUTION EPIDURAL; INTRACAUDAL at 09:58

## 2023-10-26 NOTE — PROGRESS NOTES
Procedure   - Large Joint Arthrocentesis: LASHAE anserine bursa on 10/26/2023 9:58 AM  Indications: pain  Details: 21 G needle, anteromedial approach  Medications: 1 mL bupivacaine (PF) 0.5 %; 40 mg triamcinolone acetonide 40 MG/ML; 2 mL lidocaine PF 1% 1 %  Outcome: tolerated well, no immediate complications  Procedure, treatment alternatives, risks and benefits explained, specific risks discussed. Consent was given by the patient. Immediately prior to procedure a time out was called to verify the correct patient, procedure, equipment, support staff and site/side marked as required. Patient was prepped and draped in the usual sterile fashion.

## 2023-10-26 NOTE — PROGRESS NOTES
Orthopaedic Clinic Note: Knee Established Patient    Chief Complaint   Patient presents with    Follow-up     3 week follow up -- primary osteoarthritis of right knee; Complex tear of medial meniscus of right knee as current injury        HPI    It has been 3  week(s) since Ms. Willett's last visit. She returns to clinic today for follow-up right knee pain.  She underwent intra-articular injection 3 weeks ago.  The injection worked well and providing pain with relief in her knee.  She is now complaining of pain localized to medial proximal tibia.  Rates it a 4/10 on the pain scale.  She is ambulating with no assistive device.  Denies fevers chills or constitutional symptoms.  She is requesting further intervention for ongoing knee pain.    Past Medical History:   Diagnosis Date    Allergic     Allergic rhinitis Lifelong    Moderately severe with nasal polyps    Asthma 2011    Severe flare with bronchoscopy from pine needle exposure    CTS (carpal tunnel syndrome) 2004    Bilateral    Eosinophilic esophagitis 2018    Biopsy-positive    Fracture, finger 2009    left pinkie    GERD (gastroesophageal reflux disease) 2016    Hyperlipidemia     Knee swelling     Lumbar scoliosis 2013    Recurrent discomfort    Lumbar spinal stenosis 2013    Migraine 2004    hospitalized    Mononucleosis 1962    Osteoarthritis of hands, bilateral     PFO (patent foramen ovale) 02/15/2023    Post menopausal syndrome 2000    Hot flashes with impaired sleep    Sleep apnea 2018    Sleep disorder     TIA (transient ischemic attack) 08/10/2023    Varicose vein of leg 1990    venous ligation left leg      Past Surgical History:   Procedure Laterality Date    BRONCHOSCOPY  2011    Severe allergic pneumonitis    COLONOSCOPY  2013    Benign study    FRACTURE SURGERY  2009    left pinkie finger    HAND SURGERY  2009    left pinkie    LARYNGOSCOPY  2017    Indirect exam    SKIN SURGERY      BASIL CELL    SQUAMOUS CELL CARCINOMA EXCISION Left     L leg     TONSILLECTOMY      TONSILLECTOMY      VEIN SURGERY Left     Ligation of left leg      Family History   Problem Relation Age of Onset    Esophageal cancer Father           age 60    Cancer Father         Keller's esophagus    Dementia Mother     Hearing loss Mother     Osteoarthritis Mother     Osteoporosis Mother     Achalasia Brother     Osteoarthritis Brother     Pancreatic cancer Brother     Osteoarthritis Brother         Bilateral hip replacements    Heart attack Paternal Grandfather          age 65    Heart disease Paternal Grandfather     Heart failure Paternal Grandmother     Pancreatic cancer Maternal Grandmother     Cancer Maternal Grandmother         Pancreatic    Breast cancer Neg Hx     Ovarian cancer Neg Hx     Uterine cancer Neg Hx     Colon cancer Neg Hx      Social History     Socioeconomic History    Marital status:    Tobacco Use    Smoking status: Never     Passive exposure: Never    Smokeless tobacco: Never   Vaping Use    Vaping Use: Never used   Substance and Sexual Activity    Alcohol use: No    Drug use: Never    Sexual activity: Yes     Partners: Male     Birth control/protection: Post-menopausal      Current Outpatient Medications on File Prior to Visit   Medication Sig Dispense Refill    albuterol sulfate  (90 Base) MCG/ACT inhaler Inhale 2 puffs Every 4 (Four) Hours As Needed for Wheezing or Shortness of Air. 18 g 1    aspirin 81 MG chewable tablet Chew 1 tablet Daily. 30 tablet 0    atorvastatin (LIPITOR) 40 MG tablet Take 1 tablet by mouth Every Night. 90 tablet 3    Calcium Carbonate-Vitamin D (CALTRATE 600+D PO) Take 1 tablet by mouth Daily.      fluticasone (FLONASE) 50 MCG/ACT nasal spray 1 spray into the nostril(s) as directed by provider Every Morning.      Glucosamine-Chondroit-Vit C-Mn (GLUCOSAMINE 1500 COMPLEX PO) Take 1 tablet by mouth daily.      loratadine (CLARITIN) 10 MG tablet Take 1 tablet by mouth Daily As Needed.      montelukast  "(SINGULAIR) 10 MG tablet TAKE ONE TABLET BY MOUTH DAILY AS NEEDED 90 tablet 1    Multiple Vitamins-Minerals (WOMENS ONE DAILY) tablet Take 1 tablet by mouth Daily.      vitamin C (ASCORBIC ACID) 500 MG tablet Take 1 tablet by mouth Daily.      Vitamin D, Cholecalciferol, 25 MCG (1000 UT) capsule Take 1 capsule by mouth Daily.       No current facility-administered medications on file prior to visit.      Allergies   Allergen Reactions    Shellfish Allergy Hives     oysters    Poison Ivy Extract [Poison Ivy Extract] Rash        Review of Systems   Constitutional: Negative.    HENT: Negative.     Eyes: Negative.    Respiratory: Negative.     Cardiovascular: Negative.    Gastrointestinal: Negative.    Endocrine: Negative.    Genitourinary: Negative.    Musculoskeletal:  Positive for arthralgias.   Skin: Negative.    Allergic/Immunologic: Negative.    Neurological: Negative.    Hematological: Negative.    Psychiatric/Behavioral: Negative.          The patient's Review of Systems was personally reviewed and confirmed as accurate.    Physical Exam  Blood pressure 120/72, height 165.1 cm (65\"), weight 62.6 kg (138 lb), not currently breastfeeding.    Body mass index is 22.96 kg/m².    GENERAL APPEARANCE: awake, alert, oriented, in no acute distress and well developed, well nourished  LUNGS:  breathing nonlabored  EXTREMITIES: no clubbing, cyanosis  PERIPHERAL PULSES: palpable dorsalis pedis and posterior tibial pulses bilaterally.    GAIT:  Antalgic        ----------  Right Knee Exam:  ----------  ALIGNMENT: mild varus, correctible to neutral  ----------  RANGE OF MOTION:  Normal (0-120 degrees) with no extensor lag or flexion contracture  LIGAMENTOUS STABILITY:   stable to varus and valgus stress at terminal extension and 30 degrees; retensioning of the MCL is appreciated with valgus stress at 30 degrees consistent with medial compartment degeneration  ----------  STRENGTH:  KNEE FLEXION 5/5  KNEE EXTENSION  5/5  ANKLE " DORSIFLEXION  5/5  ANKLE PLANTARFLEXION  5/5  ----------  PAIN WITH PALPATION:pes bursa  KNEE EFFUSION: no  PAIN WITH KNEE ROM: yes  PATELLAR CREPITUS:  no  ----------  SENSATION TO LIGHT TOUCH:  DEEP PERONEAL/SUPERFICIAL PERONEAL/SURAL/SAPHENOUS/TIBIAL:    intact  ----------  EDEMA:  no  ERYTHEMA:    no  WOUNDS/INCISIONS:   no  _____________________________________________________________________  _____________________________________________________________________    RADIOGRAPHIC FINDINGS:   No new imaging today    Assessment/Plan:   Diagnosis Plan   1. Pes anserine bursitis  Ambulatory Referral to Physical Therapy Evaluate and treat, Ortho      2. Primary osteoarthritis of right knee  Ambulatory Referral to Physical Therapy Evaluate and treat, Ortho      3. Complex tear of medial meniscus of right knee as current injury, initial encounter          Patient's residual pain is due to pes bursitis.  Discussed treatment options.  She is agreeable to pes bursa cortisone injection today.  In addition to this we will refer her to outpatient physical therapy for strengthening.  She will follow-up as needed.    Procedure Note:  I discussed with the patient the potential benefits of performing a therapeutic injection of the right knee pes bursa as well as potential risks including but not limited to infection, swelling, pain, bleeding, bruising, nerve/vessel damage, skin color changes, transient elevation in blood glucose levels, and fat atrophy. After informed consent and after the area was prepped with alcohol, ethyl chloride was used to numb the skin. Via the anteromedial approach, 2 cc of 1% lidocaine, 1 cc of 0.75% bupivicaine and 1 cc of 40mg/ml of Kenalog were injected into the right knee pes bursa. The patient tolerated the procedure well. There were no complications. A sterile dressing was placed over the injection site.      Silvio Isaacs MD  10/26/23  09:56 EDT

## 2023-11-03 ENCOUNTER — TREATMENT (OUTPATIENT)
Dept: PHYSICAL THERAPY | Facility: CLINIC | Age: 73
End: 2023-11-03
Payer: MEDICARE

## 2023-11-03 DIAGNOSIS — M70.50 PES ANSERINE BURSITIS: ICD-10-CM

## 2023-11-03 DIAGNOSIS — M17.11 PRIMARY OSTEOARTHRITIS OF RIGHT KNEE: Primary | ICD-10-CM

## 2023-11-03 PROCEDURE — 97162 PT EVAL MOD COMPLEX 30 MIN: CPT | Performed by: PHYSICAL THERAPIST

## 2023-11-03 PROCEDURE — 97530 THERAPEUTIC ACTIVITIES: CPT | Performed by: PHYSICAL THERAPIST

## 2023-11-03 PROCEDURE — 97110 THERAPEUTIC EXERCISES: CPT | Performed by: PHYSICAL THERAPIST

## 2023-11-03 NOTE — PROGRESS NOTES
Physical Therapy Initial Evaluation and Plan of Care    [address] [facility] [address]  [place] [address]    Patient: Tatyana Willett   : 1950  Diagnosis/ICD-10 Code:  Primary osteoarthritis of right knee [M17.11]  Referring practitioner: Silvio Isaacs MD  Date of Initial Visit: 11/3/2023  Today's Date: 11/3/2023  Patient seen for 1 sessions         Visit Diagnoses:    ICD-10-CM ICD-9-CM   1. Primary osteoarthritis of right knee  M17.11 715.16   2. Pes anserine bursitis  M70.50 726.61       Subjective Questionnaire: LEFS: 59      Subjective Evaluation    History of Present Illness  Date of onset: 2023  Mechanism of injury: Pt states she went to [facility] in Sept, lots of walking. States on plane ride home, whole right leg hurt, thought it could be her low back referring pain or a blood clot. States when she got home, r/o DVT. States she was shooting bball with grandson, knee gave way and increased pain. State she had imaging, revealed OA and torn mensicus, saw Dr. Isaacs, decided on an injection. First injection helped a lot with decreasing pain/inflammation, allowed her to do stairs and get around better. States she continued to have medial tibial pain, went for a second injection around bursa, which did not help. Initially was unable to climb stairs due to severe pain, was doing step to pattern prior to first injection last 1-2 months.   Walks dog outside, would like to walk 3 miles but unable to due to knee pain, walking about 1/2 mile now. Wearing knee sleeve for support during walking. Mild swelling. Unable to sail, suppose to go on . Unable to do dog agility tasks.   Using ice. Diclofenac cream with improvement. Difficult with getting comfortable for sleep when s/l so sleeping on stomach more often now.    Subjective comment: Pt presents with c/o right knee pain.  Patient Occupation: . Retired RN and APRN. Enjoys travel, sailing, walking, active, dog. Pain  Current pain  ratin  At best pain ratin  At worst pain ratin  Quality: tight and dull ache  Relieving factors: change in position and ice  Aggravating factors: squatting, ambulation, prolonged positioning, standing, stairs, lifting, movement and sleeping  Progression: no change    Social Support  Lives in: multiple-level home  Lives with: spouse    Hand dominance: right    Diagnostic Tests  X-ray: abnormal  MRI studies: abnormal    Treatments  No previous or current treatments  Patient Goals  Patient goals for therapy: decreased pain, increased strength and return to sport/leisure activities             Objective          Observations     Right Knee   Positive for effusion.     Additional Knee Observation Details  Increased joint swelling R knee compared to L, both medial and lateral    Tenderness     Right Knee   Tenderness in the lateral joint line, medial joint line and superior patella. No tenderness in the patellar tendon, pes anserinus and plica tenderness.     Neurological Testing     Sensation     Knee   Left Knee   Intact: Light touch    Right Knee   Intact: light touch     Active Range of Motion   Left Knee   Flexion: 130 degrees   Extension: 0 degrees     Right Knee   Flexion: 128 degrees   Extension: 0 degrees     Additional Active Range of Motion Details  Pain with end range both flex/ext    Patellar Mobility   Left Knee Patellar tendons within functional limits include the medial, lateral, superior and inferior.     Right Knee Hypomobile in the medial, lateral, superior and inferior patellar tendon(s).     Additional Patellar Mobility Details  Audible crepitus    Patellar Static Positioning   Left Knee: WFL  Right Knee: lateral tilt and precious    Strength/Myotome Testing     Left Knee   Normal strength  Quadriceps contraction: good    Right Knee   Flexion: 4  Extension: 4  Quadriceps contraction: fair    Tests     Right Knee   Positive patellar compression, patella-femoral grind and Thessaly's test at 5  degrees.   Negative valgus stress test at 0 degrees, valgus stress test at 30 degrees, varus stress test at 0 degrees and varus stress test at 30 degrees.           Assessment & Plan       Assessment  Impairments: abnormal muscle firing, abnormal muscle tone, abnormal or restricted ROM, activity intolerance, impaired balance, impaired physical strength, lacks appropriate home exercise program and pain with function   Functional limitations: lifting, sleeping, walking, pulling, pushing, uncomfortable because of pain and standing   Assessment details: Pt is a 72 yo female referred to PT for chronic right knee pain. Recent XR revealed moderate tricompartmental OA and MRI revealed lateral and medial meniscal tears, advanced arthritic changes to right patella and chondromalacia patella. Functionally, pt has moderate joint swelling, impaired patellar tracking, and functional weakness of proximal hip. She is active, would like to return to higher level of daily activity with more stability of R knee. Recommend skilled PT to improve these functional deficits. She would like to avoid knee surgery.   Prognosis: good  Prognosis details: .      Goals  Plan Goals: Short term goals (3 weeks)  1. Patient to report right knee pain less than or equal to 2-3/10.  2. Patient to demonstrate right knee flexion to at least 130 degrees.  3. Patient to demonstrate knee extension to at least 0 degrees pain free.  4. Patient to be compliant with initial HEP.  5. Patient will report decreased pain rating on VAS by at least 50% with recreational activities.  6. Patient will be independent with patellar taping or brace use.     Long Term goals (6 weeks)  1. Patient to ascend and descend eight 8 inch steps reciprocally with one handrail with minimal to no antalgia or difficulty.  2. Patient will demonstrate independent HEP progressed for closed chain strength, proprioception, balance and agility with minimal or no compensations or  exacerbations  3. Patient to report pain intermittently equal to zero.  4. Patient will demonstrate improved functional outcome measure by 15 points  5. Pt will return to walking dog outside for 1 mile without increased knee pain.       Plan  Therapy options: will be seen for skilled therapy services  Planned modality interventions: cryotherapy, dry needling, TENS and thermotherapy (hydrocollator packs)  Planned therapy interventions: abdominal trunk stabilization, balance/weight-bearing training, body mechanics training, flexibility, functional ROM exercises, gait training, home exercise program, joint mobilization, manual therapy, neuromuscular re-education, postural training, soft tissue mobilization, strengthening, stretching and therapeutic activities  Frequency: 2x week  Duration in visits: 8  Duration in weeks: 6  Treatment plan discussed with: patient  Plan details: Start 2x week initially, then will decrease to 1x/week. Assess response from initial HEP. Trial taping next visit with medial rotation of tibia for improved patellar tracking.         History # of Personal factors and/or comorbidities: MODERATE (1-2)  Examination of Body System(s): # of elements: MODERATE (3)  Clinical Presentation: STABLE   Clinical Decision Making: MODERATE      Timed:  Manual Therapy:    0     mins  26331;  Therapeutic Exercise:    10     mins  65185;     Neuromuscular Blake:    0    mins  94490;    Therapeutic Activity:     14     mins  97288;     Gait Trainin     mins  06954;     Ultrasound:     0     mins  34067;    Ionto   __0_  mins  47813;    Un-Timed:  Electrical Stimulation:    0     mins  64869 ( );  Dry Needling     0     mins self-pay  Traction  _ 0_   mins  80261  Low Eval  __0_ mins  26887  Mod Eval  _31__ mins  60790  High Eval  _0__ mins   95901    Timed Treatment:   24   mins   Total Treatment:     55   mins    PT SIGNATURE: Corinne E. Perkins, PT   [place] License: [id number]      Certification Period:  11/3/2023 thru 1/31/2024  I certify that the therapy services are furnished while this patient is under my care.  The services outlined above are required by this patient, and will be reviewed every 90 days.        Physician Signature: _______________________________________________________________________________________________________     PHYSICIAN: Silvio Isaacs MD   NPI: [id number]     DATE:                                             Please sign and return via fax to [employer] . Thank you, [facility].

## 2023-11-09 ENCOUNTER — TREATMENT (OUTPATIENT)
Dept: PHYSICAL THERAPY | Facility: CLINIC | Age: 73
End: 2023-11-09
Payer: MEDICARE

## 2023-11-09 DIAGNOSIS — M70.50 PES ANSERINE BURSITIS: ICD-10-CM

## 2023-11-09 DIAGNOSIS — M17.11 PRIMARY OSTEOARTHRITIS OF RIGHT KNEE: Primary | ICD-10-CM

## 2023-11-09 PROCEDURE — 97112 NEUROMUSCULAR REEDUCATION: CPT | Performed by: PHYSICAL THERAPIST

## 2023-11-09 PROCEDURE — 97110 THERAPEUTIC EXERCISES: CPT | Performed by: PHYSICAL THERAPIST

## 2023-11-09 NOTE — PROGRESS NOTES
Physical Therapy Daily Treatment Note     [facility], [address]  [place] [address]    Patient: Tatyana Willett   : 1950  Referring practitioner: Silvio Isaacs MD  Date of Initial Visit: Type: THERAPY  Noted: 11/3/2023  Today's Date: 2023  Patient seen for 2 sessions       Visit Diagnoses:    ICD-10-CM ICD-9-CM   1. Primary osteoarthritis of right knee  M17.11 715.16   2. Pes anserine bursitis  M70.50 726.61       Subjective   Patient reports she has been doing her exercises faithfully, feels better. States she was able to sail on  without knee issues. States  her pain level is 0-1/10 upon arrival.      Objective   See Exercise, Manual, and Modality Logs for complete treatment.       Assessment/Plan  Pt tolerated both CKC and OKC exercises well today. Impaired static and dynamic balance noted on RLE vs LLE. Pt educated on hip hinge technique to avoid increased knee strain and keep weight in heels. She demonstrates improved sit-stand without reproduction of knee pain. Did not perform taping today, not necessary. Assess response from exercises. Progress hip abd, ext, and quad strengthening as tolerated.       Timed:         Manual Therapy:    0     mins  61938;     Therapeutic Exercise:    23     mins  35271;     Neuromuscular Blake:    15    mins  17314;    Therapeutic Activity:     5     mins  06574;     Gait Trainin     mins  81236;     Ultrasound:     0     mins  60050;    Ionto                               0    mins   14594  Self Care                       0     mins   23809  Canalith Repos    0     mins 18955      Un-Timed:  Electrical Stimulation:    0     mins  88065 ( );  Dry Needling     0     mins self-pay  Traction     0     mins 65254      Timed Treatment:   43   mins   Total Treatment:     43   mins    Corinne E. Perkins, PT  [place] License: [id number]

## 2023-11-13 ENCOUNTER — TREATMENT (OUTPATIENT)
Dept: PHYSICAL THERAPY | Facility: CLINIC | Age: 73
End: 2023-11-13
Payer: MEDICARE

## 2023-11-13 DIAGNOSIS — M17.11 PRIMARY OSTEOARTHRITIS OF RIGHT KNEE: Primary | ICD-10-CM

## 2023-11-13 DIAGNOSIS — M70.50 PES ANSERINE BURSITIS: ICD-10-CM

## 2023-11-13 PROCEDURE — 97530 THERAPEUTIC ACTIVITIES: CPT | Performed by: PHYSICAL THERAPIST

## 2023-11-13 PROCEDURE — 97110 THERAPEUTIC EXERCISES: CPT | Performed by: PHYSICAL THERAPIST

## 2023-11-13 PROCEDURE — 97140 MANUAL THERAPY 1/> REGIONS: CPT | Performed by: PHYSICAL THERAPIST

## 2023-11-13 NOTE — PROGRESS NOTES
Physical Therapy Daily Treatment Note    [address] [facility], [address]  [place] [address]    Patient: Tatyana Willett   : 1950  Referring practitioner: Silvio Isaacs MD  Date of Initial Visit: Type: THERAPY  Noted: 11/3/2023  Today's Date: 2023  Patient seen for 3 sessions       Visit Diagnoses:    ICD-10-CM ICD-9-CM   1. Primary osteoarthritis of right knee  M17.11 715.16   2. Pes anserine bursitis  M70.50 726.61       Subjective   Patient reports she has been doing her exercises consistently at home. States she can tell the step down exercise is definitely weaker on right leg. Denies true knee pain, just feels not as strong/steady on R leg. States  her pain level is 0-1/10 upon arrival.      Objective   Mild right knee swelling persists compared to L knee per visual observation, noted suprapatellar region, laterally, and mild medial R knee    See Exercise, Manual, and Modality Logs for complete treatment.       Assessment/Plan  Patient demonstrates functional R knee AROM and is making steady progress with improved quad/glute strengthening with standing isometrics. Continue progression of eccentric training as tolerated for R quads. Added hip abd exercises to HEP today, including RTB for side stepping, zig zag stepping. TTP mid ITB on RLE. Assess response from manual techniques in clinic. Could repeat along ITB next visit or consider DN.       Timed:         Manual Therapy:    13     mins  09370;     Therapeutic Exercise:    15     mins  09347;     Neuromuscular Blake:    0    mins  59674;    Therapeutic Activity:     10     mins  11146;     Gait Trainin     mins  96074;     Ultrasound:     0     mins  65547;    Ionto                               0    mins   67514  Self Care                       0     mins   80268  Canalith Repos    0     mins 72124      Un-Timed:  Electrical Stimulation:    0     mins  21457 (MC );  Dry Needling     0     mins self-pay  Traction     0     mins  11712      Timed Treatment:   38   mins   Total Treatment:     38   mins    Corinne E. Perkins, PT  [place] License: [id number]

## 2023-11-16 ENCOUNTER — TREATMENT (OUTPATIENT)
Dept: PHYSICAL THERAPY | Facility: CLINIC | Age: 73
End: 2023-11-16
Payer: MEDICARE

## 2023-11-16 DIAGNOSIS — M17.11 PRIMARY OSTEOARTHRITIS OF RIGHT KNEE: Primary | ICD-10-CM

## 2023-11-16 DIAGNOSIS — M70.50 PES ANSERINE BURSITIS: ICD-10-CM

## 2023-11-16 PROCEDURE — 97110 THERAPEUTIC EXERCISES: CPT | Performed by: PHYSICAL THERAPIST

## 2023-11-16 PROCEDURE — 97112 NEUROMUSCULAR REEDUCATION: CPT | Performed by: PHYSICAL THERAPIST

## 2023-11-16 PROCEDURE — 97530 THERAPEUTIC ACTIVITIES: CPT | Performed by: PHYSICAL THERAPIST

## 2023-11-16 NOTE — PROGRESS NOTES
Physical Therapy Daily Treatment Note     [address] [facility], [address]  [place] [address]    Patient: Tatyana Willett   : 1950  Referring practitioner: Silvio Isaacs MD  Date of Initial Visit: Type: THERAPY  Noted: 11/3/2023  Today's Date: 2023  Patient seen for 4 sessions       Visit Diagnoses:    ICD-10-CM ICD-9-CM   1. Primary osteoarthritis of right knee  M17.11 715.16   2. Pes anserine bursitis  M70.50 726.61       Subjective   Patient reports soreness after last visit, make have been related to increased activity and doing step down exercises. States she went sailing yesterday without balance or knee issues noted. States  her pain level is 1/10 upon arrival.      Objective   Decreased R knee swelling noted per visual observation today medially and suprapatellar region    See Exercise, Manual, and Modality Logs for complete treatment.       Assessment/Plan  Patient tolerated progression of standing CKC exercises well without reproduction of medial knee pain. Reviewed current HEP, discussed side stepping vs clamshell with RTB for hip abduction. Could consider increased resistance next 1-2 visits. Improved ITB flexibility today, not as TTP as well. R knee valgus strain persists with functional eccentric lowering RLE.       Timed:         Manual Therapy:    0     mins  87940;     Therapeutic Exercise:    24     mins  62992;     Neuromuscular Blake:    10    mins  66448;    Therapeutic Activity:     10     mins  66915;     Gait Trainin     mins  14842;     Ultrasound:     0     mins  43334;    Ionto                               0    mins   25158  Self Care                       0     mins   81456  Canalith Repos    0     mins 19232      Un-Timed:  Electrical Stimulation:    0     mins  23616 ( );  Dry Needling     0     mins self-pay  Traction     0     mins 31954      Timed Treatment:   44   mins   Total Treatment:     44   mins    Corinne E. Perkins, PT  [place] License: [id number]

## 2023-11-21 ENCOUNTER — TREATMENT (OUTPATIENT)
Dept: PHYSICAL THERAPY | Facility: CLINIC | Age: 73
End: 2023-11-21
Payer: MEDICARE

## 2023-11-21 DIAGNOSIS — M17.11 PRIMARY OSTEOARTHRITIS OF RIGHT KNEE: Primary | ICD-10-CM

## 2023-11-21 DIAGNOSIS — M70.50 PES ANSERINE BURSITIS: ICD-10-CM

## 2023-11-21 PROCEDURE — 97112 NEUROMUSCULAR REEDUCATION: CPT | Performed by: PHYSICAL THERAPIST

## 2023-11-21 PROCEDURE — 97110 THERAPEUTIC EXERCISES: CPT | Performed by: PHYSICAL THERAPIST

## 2023-11-21 NOTE — PROGRESS NOTES
Physical Therapy Daily Treatment Note    [address] [facility], [address]  [place] [address]    Patient: Tatyana Willett   : 1950  Referring practitioner: Silvio Isaacs MD  Date of Initial Visit: Type: THERAPY  Noted: 11/3/2023  Today's Date: 2023  Patient seen for 5 sessions       Visit Diagnoses:    ICD-10-CM ICD-9-CM   1. Primary osteoarthritis of right knee  M17.11 715.16   2. Pes anserine bursitis  M70.50 726.61       Subjective   Patient reports less knee swelling overall, pleased with progress. States she would like to start walking more after [place]. States her pain level is 0/10 upon arrival.      Objective   See Exercise, Manual, and Modality Logs for complete treatment.       Assessment/Plan  Patient is making steady progress towards her functional goals. Reviewed progression of HEP with use of BOSU at home for balance challenge and LE strengthening. Could resume iastm to ITB as needed; however improved flexibility of LE noted today.      Timed:         Manual Therapy:    0     mins  84403;     Therapeutic Exercise:    24     mins  77287;     Neuromuscular Blake:    8    mins  01963;    Therapeutic Activity:     0     mins  59831;     Gait Trainin     mins  82143;     Ultrasound:     0     mins  64922;    Ionto                               0    mins   90006  Self Care                       0     mins   71852  Canalith Repos    0     mins 11353      Un-Timed:  Electrical Stimulation:    0     mins  69383 ( );  Dry Needling     0     mins self-pay  Traction     0     mins 39081      Timed Treatment:   32   mins   Total Treatment:     32   mins    Corinne E. Perkins, PT  [place] License: [id number]

## 2023-11-27 ENCOUNTER — TREATMENT (OUTPATIENT)
Dept: PHYSICAL THERAPY | Facility: CLINIC | Age: 73
End: 2023-11-27
Payer: MEDICARE

## 2023-11-27 DIAGNOSIS — M70.50 PES ANSERINE BURSITIS: ICD-10-CM

## 2023-11-27 DIAGNOSIS — M17.11 PRIMARY OSTEOARTHRITIS OF RIGHT KNEE: Primary | ICD-10-CM

## 2023-11-27 PROCEDURE — 97530 THERAPEUTIC ACTIVITIES: CPT | Performed by: PHYSICAL THERAPIST

## 2023-11-27 PROCEDURE — 97110 THERAPEUTIC EXERCISES: CPT | Performed by: PHYSICAL THERAPIST

## 2023-11-27 NOTE — PROGRESS NOTES
Physical Therapy Daily Treatment Note    [address] [facility], [address]  [place] [address]    Patient: Tatyana Willett   : 1950  Referring practitioner: Silvio Isaacs MD  Date of Initial Visit: Type: THERAPY  Noted: 11/3/2023  Today's Date: 2023  Patient seen for 6 sessions       Visit Diagnoses:    ICD-10-CM ICD-9-CM   1. Primary osteoarthritis of right knee  M17.11 715.16   2. Pes anserine bursitis  M70.50 726.61       Subjective   Patient reports she hasn't done as much exercise since last week with the holiday. States  her pain level is 0/10 upon arrival.  States she was able to sail over the weekend, feels okay with balance.     Objective   See Exercise, Manual, and Modality Logs for complete treatment.       Assessment/Plan  Patient is making steady progress towards her functional goals. Held progression of dynamic balance exercises today, emphasis on strengthening. Reassessment next visit. Trial NMRE exercises on rebounder. Added clamshell and bridges with GTB to current HEP.       Timed:         Manual Therapy:    0     mins  17698;     Therapeutic Exercise:    24     mins  08739;     Neuromuscular Blake:    0    mins  85801;    Therapeutic Activity:     14     mins  94575;     Gait Trainin     mins  32184;     Ultrasound:     0     mins  66439;    Ionto                               0    mins   37781  Self Care                       0     mins   64332  Canalith Repos    0     mins 61349      Un-Timed:  Electrical Stimulation:    0     mins  23175 ( );  Dry Needling     0     mins self-pay  Traction     0     mins 69840      Timed Treatment:   38   mins   Total Treatment:     38   mins    Corinne E. Perkins, PT  [place] License: [id number]

## 2023-11-30 ENCOUNTER — TREATMENT (OUTPATIENT)
Dept: PHYSICAL THERAPY | Facility: CLINIC | Age: 73
End: 2023-11-30
Payer: MEDICARE

## 2023-11-30 DIAGNOSIS — M70.50 PES ANSERINE BURSITIS: ICD-10-CM

## 2023-11-30 DIAGNOSIS — M17.11 PRIMARY OSTEOARTHRITIS OF RIGHT KNEE: Primary | ICD-10-CM

## 2023-11-30 PROCEDURE — 97110 THERAPEUTIC EXERCISES: CPT | Performed by: PHYSICAL THERAPIST

## 2023-11-30 PROCEDURE — 97112 NEUROMUSCULAR REEDUCATION: CPT | Performed by: PHYSICAL THERAPIST

## 2023-11-30 NOTE — PROGRESS NOTES
Physical Therapy Re Certification Of Plan of Care  [address] [address]  [place] [address]    Patient: Tatyana Willett   : 1950  Diagnosis/ICD-10 Code:  Primary osteoarthritis of right knee [M17.11]  Referring practitioner: Silvio Isaacs MD  Date of Initial Visit: 2023  Today's Date: 2023  Patient seen for 7 sessions         Visit Diagnoses:    ICD-10-CM ICD-9-CM   1. Primary osteoarthritis of right knee  M17.11 715.16   2. Pes anserine bursitis  M70.50 726.61           Subjective Questionnaire: LEFS: 69  Clinical Progress: improved  Home Program Compliance: Yes  Treatment has included: therapeutic exercise, neuromuscular re-education, manual therapy, and therapeutic activity      Subjective   Tatyana Willett reports: her knee has been doing well. States going down stairs is the only thing bothering her now, not pain driven, more instability and weakness. States she has to think about lowering weight more on RLE vs LLE with going down stairs. States she is careful with pivoting quickly.     Objective          Observations     Right Knee   Positive for effusion.     Additional Knee Observation Details  Mild joint swelling R knee compared to L,  lateral joint only    Tenderness     Right Knee   No tenderness in the patellar tendon, pes anserinus and plica tenderness.     Neurological Testing     Sensation     Knee   Left Knee   Intact: Light touch    Right Knee   Intact: light touch     Active Range of Motion   Left Knee   Flexion: 130 degrees   Extension: 0 degrees     Right Knee   Flexion: 131 degrees   Extension: 0 degrees     Patellar Mobility   Left Knee Patellar tendons within functional limits include the medial, lateral, superior and inferior.     Right Knee Patellar tendons within functional limits include the medial, lateral, superior and inferior.     Patellar Static Positioning   Left Knee: WFL  Right Knee: lateral tilt and precious    Strength/Myotome Testing     Left  Knee   Normal strength  Quadriceps contraction: good    Right Knee   Flexion: 4+  Extension: 4  Quadriceps contraction: good    Tests     Right Knee   Positive Thessaly's test at 5 degrees.   Negative patellar compression, patella-femoral grind, valgus stress test at 0 degrees, valgus stress test at 30 degrees, varus stress test at 0 degrees and varus stress test at 30 degrees.     Additional Tests Details  15x SLR on RLE prior to fatigue    NMRE:   Narrow WENDI EO/EC 30 sec bilat  Mod tandem EO: 30 sec bilat  Mod tandem EC: 30 sec bilat  Tandem EO: 30 sec bilat level surface  Tandem EC: L 23 seconds, 30 sec R  SLS: EO 30 sec bilaterally level surface    Did not test unlevel surface today    Ambulation     Comments   Pt ambulates with step through gait pattern, denies increased pain with walking.    Able to perform stairs with reciprocal gait pattern both up/down, 1 handrail intermittently. Denies pain.           Assessment & Plan       Assessment  Impairments: abnormal muscle firing, impaired balance and impaired physical strength   Functional limitations: walking   Assessment details: Reassessment completed today in clinic for 72 yo female referred to PT for chronic right knee pain. Recent XR revealed moderate tricompartmental OA and MRI revealed lateral and medial meniscal tears, advanced arthritic changes to right patella and chondromalacia patella. Functionally, pt demonstrates full R knee AROM, improved patellar mobility, less swelling, and denies pain. She is making steady progress with improved functional strength and dynamic balance, compliant with current HEP. She is active. Recommend continued skilled PT to improve eccentric quad control and progress proximal hip/core strength for improved balance.       Prognosis: good  Prognosis details: .      Goals  Plan Goals: Short term goals (3 weeks)  1. Patient to report right knee pain less than or equal to 2-3/10. MET  2. Patient to demonstrate right knee flexion  to at least 130 degrees. MET  3. Patient to demonstrate knee extension to at least 0 degrees pain free. MET  4. Patient to be compliant with initial HEP. MET  5. Patient will report decreased pain rating on VAS by at least 50% with recreational activities. MET  6. Patient will be independent with patellar taping or brace use. ONGOING- not needed currently    Long Term goals (6 weeks)  1. Patient to ascend and descend eight 8 inch steps reciprocally with one handrail with minimal to no antalgia or difficulty. MET  2. Patient will demonstrate independent HEP progressed for closed chain strength, proprioception, balance and agility with minimal or no compensations or exacerbations. ONGOING  3. Patient to report pain intermittently equal to zero. MET  4. Patient will demonstrate improved functional outcome measure by 15 points. ONGOING  5. Pt will return to walking dog outside for 1 mile without increased knee pain. MET      Plan  Therapy options: will be seen for skilled therapy services  Planned modality interventions: cryotherapy, dry needling, TENS and thermotherapy (hydrocollator packs)  Planned therapy interventions: abdominal trunk stabilization, balance/weight-bearing training, body mechanics training, flexibility, functional ROM exercises, gait training, home exercise program, joint mobilization, manual therapy, neuromuscular re-education, postural training, soft tissue mobilization, strengthening, stretching and therapeutic activities  Frequency: 1x week  Duration in visits: 4  Duration in weeks: 4  Treatment plan discussed with: patient  Plan details: Will decrease PT frequency to 1x/week, increased emphasis on indep with HEP.            Recommendations: Continue as planned  Timeframe: 1 month  Prognosis to achieve goals: good      Timed:         Manual Therapy:    0     mins  45939;     Therapeutic Exercise:    25     mins  89911;     Neuromuscular Blake:    15    mins  56762;    Therapeutic Activity:     0      mins  02290;     Gait Trainin     mins  10552;     Ultrasound:     0     mins  12544;    Ionto                               0    mins   42043  Self Care                       0     mins   52183  Canalith Repos    0     mins 79069      Un-Timed:  Electrical Stimulation:    0     mins  13009 ( );  Dry Needling     0     mins self-pay  Traction     0     mins 51427  Re-Eval                           0    mins  81550      Timed Treatment:   40   mins   Total Treatment:     40   mins          PT: Corinne E. Perkins, PT     [place] License:  [id number]    Electronically signed by Corinne E. Perkins, PT, 23, 8:21 AM EST    Certification Period: 2023 thru 2024  I certify that the therapy services are furnished while this patient is under my care.  The services outlined above are required by this patient, and will be reviewed every 90 days.         Physician Signature:__________________________________________________    PHYSICIAN: Silvio Isaacs MD   NPI: [id number]     DATE:     Please sign and return via fax to [email or web address] . Thank you, [facility]

## 2023-12-05 ENCOUNTER — TREATMENT (OUTPATIENT)
Dept: PHYSICAL THERAPY | Facility: CLINIC | Age: 73
End: 2023-12-05
Payer: MEDICARE

## 2023-12-05 DIAGNOSIS — M17.11 PRIMARY OSTEOARTHRITIS OF RIGHT KNEE: Primary | ICD-10-CM

## 2023-12-05 DIAGNOSIS — M70.50 PES ANSERINE BURSITIS: ICD-10-CM

## 2023-12-05 PROCEDURE — 97110 THERAPEUTIC EXERCISES: CPT | Performed by: PHYSICAL THERAPIST

## 2023-12-05 NOTE — PROGRESS NOTES
Physical Therapy Daily Treatment Note    [address] [facility], [address]  [place] [address]    Patient: Tatyana Willett   : 1950  Referring practitioner: Silvio Isaacs MD  Date of Initial Visit: Type: THERAPY  Noted: 11/3/2023  Today's Date: 2023  Patient seen for 8 sessions       Visit Diagnoses:    ICD-10-CM ICD-9-CM   1. Primary osteoarthritis of right knee  M17.11 715.16   2. Pes anserine bursitis  M70.50 726.61       Subjective   Patient reports she is getting better overall, going down stairs is getting easier. States  her pain level is 0/10 upon arrival.      Objective   See Exercise, Manual, and Modality Logs for complete treatment.       Assessment/Plan  Reviewed current HEP and progressed to reflect additions with BOSU. She is compliant with current program, making progress with dynamic balance exercises. Could consider decreased PT frequency, increase emphasis on HEP.       Timed:         Manual Therapy:    0     mins  80234;     Therapeutic Exercise:    26     mins  54638;     Neuromuscular Blake:    0    mins  10078;    Therapeutic Activity:     0     mins  99021;     Gait Trainin     mins  36101;     Ultrasound:     0     mins  55576;    Ionto                               0    mins   74957  Self Care                       0     mins   64344  Canalith Repos    0     mins 73938      Un-Timed:  Electrical Stimulation:    0     mins  01001 ( );  Dry Needling     0     mins self-pay  Traction     0     mins 44417      Timed Treatment:   26   mins   Total Treatment:     26   mins    Corinne E. Perkins, PT  [place] License: [id number]

## 2023-12-14 ENCOUNTER — TREATMENT (OUTPATIENT)
Dept: PHYSICAL THERAPY | Facility: CLINIC | Age: 73
End: 2023-12-14
Payer: MEDICARE

## 2023-12-14 DIAGNOSIS — M70.50 PES ANSERINE BURSITIS: ICD-10-CM

## 2023-12-14 DIAGNOSIS — M17.11 PRIMARY OSTEOARTHRITIS OF RIGHT KNEE: Primary | ICD-10-CM

## 2023-12-14 PROCEDURE — 97530 THERAPEUTIC ACTIVITIES: CPT | Performed by: PHYSICAL THERAPIST

## 2023-12-14 PROCEDURE — 97110 THERAPEUTIC EXERCISES: CPT | Performed by: PHYSICAL THERAPIST

## 2023-12-14 NOTE — PROGRESS NOTES
Physical Therapy Daily Treatment Note    [address] [facility], [address]  [place] [address]    Patient: Tatyana Willett   : 1950  Referring practitioner: Silvio Isaacs MD  Date of Initial Visit: Type: THERAPY  Noted: 11/3/2023  Today's Date: 2023  Patient seen for 9 sessions       Visit Diagnoses:    ICD-10-CM ICD-9-CM   1. Primary osteoarthritis of right knee  M17.11 715.16   2. Pes anserine bursitis  M70.50 726.61       Subjective   Patient reports she has been feeling well. States she was able to resume her agility dog class and jog with her dog for the first time in a while. States her pain level is 0/10 upon arrival.      Objective   See Exercise, Manual, and Modality Logs for complete treatment.       Assessment/Plan  Patient demonstrates functional knee AROM and is making steady progress with functional strength for LE. She is indep with current HEP, using resistance bands and BOSU. Reviewed current HEP and progression options for home. Anticipate d/c from [facility] if lack of further f/u required in next 30 days.     Timed:         Manual Therapy:    0     mins  80224;     Therapeutic Exercise:    10     mins  24654;     Neuromuscular Blake:    0    mins  23047;    Therapeutic Activity:     18     mins  32706;     Gait Trainin     mins  97761;     Ultrasound:     0     mins  29465;    Ionto                               0    mins   24222  Self Care                       0     mins   05815  Canalith Repos    0     mins 07347      Un-Timed:  Electrical Stimulation:    0     mins  38013 ( );  Dry Needling     0     mins self-pay  Traction     0     mins 21846      Timed Treatment:   28   mins   Total Treatment:     28   mins    Corinne E. Perkins, PT  [place] License: [id number]

## 2024-01-02 ENCOUNTER — TRANSCRIBE ORDERS (OUTPATIENT)
Dept: ADMINISTRATIVE | Facility: HOSPITAL | Age: 74
End: 2024-01-02
Payer: MEDICARE

## 2024-01-02 DIAGNOSIS — Z12.31 VISIT FOR SCREENING MAMMOGRAM: Primary | ICD-10-CM

## 2024-01-05 ENCOUNTER — PRIOR AUTHORIZATION (OUTPATIENT)
Dept: FAMILY MEDICINE CLINIC | Facility: CLINIC | Age: 74
End: 2024-01-05
Payer: MEDICARE

## 2024-03-04 ENCOUNTER — TELEPHONE (OUTPATIENT)
Dept: PULMONOLOGY | Facility: CLINIC | Age: 74
End: 2024-03-04

## 2024-03-04 NOTE — TELEPHONE ENCOUNTER
Caller: Tatyana Willett    Relationship: Self    Best call back number: [phone] (home)       Equipment requested: HOSE, MASK, NASAL MASK, HEADGEAR IN A SMALL, FILTERS, POLLEN FILTER    Reason for the request: OUT    Prescribing Provider: [facility]    Additional information or concerns: PLEASE SEND RX TO [facility]

## 2024-03-12 DIAGNOSIS — G47.33 OSA (OBSTRUCTIVE SLEEP APNEA): Primary | ICD-10-CM

## 2024-03-21 ENCOUNTER — HOSPITAL ENCOUNTER (OUTPATIENT)
Dept: MAMMOGRAPHY | Facility: HOSPITAL | Age: 74
Discharge: HOME | End: 2024-03-21
Admitting: FAMILY MEDICINE
Payer: MEDICARE

## 2024-03-21 DIAGNOSIS — Z12.31 VISIT FOR SCREENING MAMMOGRAM: ICD-10-CM

## 2024-03-21 PROCEDURE — 77067 SCR MAMMO BI INCL CAD: CPT

## 2024-03-21 PROCEDURE — 77063 BREAST TOMOSYNTHESIS BI: CPT

## 2024-04-08 RX ORDER — ATORVASTATIN CALCIUM 40 MG/1
40 TABLET, FILM COATED ORAL NIGHTLY
Qty: 90 TABLET | Refills: 3 | Status: SHIPPED | OUTPATIENT
Start: 2024-04-08

## 2024-05-03 DIAGNOSIS — J45.20 MILD INTERMITTENT ASTHMA WITHOUT COMPLICATION: ICD-10-CM

## 2024-05-03 DIAGNOSIS — J30.2 SEASONAL ALLERGIC RHINITIS, UNSPECIFIED TRIGGER: ICD-10-CM

## 2024-05-03 RX ORDER — MONTELUKAST SODIUM 10 MG/1
10 TABLET ORAL DAILY PRN
Qty: 90 TABLET | Refills: 1 | Status: SHIPPED | OUTPATIENT
Start: 2024-05-03

## 2024-05-03 NOTE — TELEPHONE ENCOUNTER
Caller: Tatyana Willett    Relationship: Self    Best call back number: [phone]     Requested Prescriptions:   Requested Prescriptions     Pending Prescriptions Disp Refills    montelukast (SINGULAIR) 10 MG tablet 90 tablet 1     Sig: Take 1 tablet by mouth Daily As Needed.        Pharmacy where request should be sent: [facility] [id number] [facility] [phone] [facility] [phone]      Last office visit with prescribing clinician: 9/18/2023   Last telemedicine visit with prescribing clinician: Visit date not found   Next office visit with prescribing clinician: 8/1/2024     Additional details provided by patient: SHE REQUESTED FROM THE PHARMACY LAST WEEK. SHE IS GOING OUT OF TOWN TOMORROW MORNING AND NEEDS IT SENT IN THIS MORNING.    Does the patient have less than a 3 day supply:  [] Yes  [] No    Would you like a call back once the refill request has been completed: [] Yes [x] No    If the office needs to give you a call back, can they leave a voicemail: [] Yes [x] No    Te Lawton, PCT   05/03/24 09:38 EDT

## 2024-07-15 ENCOUNTER — OFFICE VISIT (OUTPATIENT)
Dept: INTERNAL MEDICINE | Facility: CLINIC | Age: 74
End: 2024-07-15
Payer: MEDICARE

## 2024-07-15 VITALS
HEART RATE: 61 BPM | WEIGHT: 138.5 LBS | SYSTOLIC BLOOD PRESSURE: 110 MMHG | OXYGEN SATURATION: 98 % | TEMPERATURE: 97.5 F | RESPIRATION RATE: 20 BRPM | HEIGHT: 65 IN | BODY MASS INDEX: 23.07 KG/M2 | DIASTOLIC BLOOD PRESSURE: 80 MMHG

## 2024-07-15 DIAGNOSIS — Z23 ENCOUNTER FOR VACCINATION: ICD-10-CM

## 2024-07-15 DIAGNOSIS — Z78.0 ASYMPTOMATIC POSTMENOPAUSAL STATE: ICD-10-CM

## 2024-07-15 DIAGNOSIS — Z86.73 HISTORY OF TRANSIENT ISCHEMIC ATTACK (TIA): ICD-10-CM

## 2024-07-15 DIAGNOSIS — Z13.6 SCREENING FOR ISCHEMIC HEART DISEASE: ICD-10-CM

## 2024-07-15 DIAGNOSIS — T75.3XXA MOTION SICKNESS, INITIAL ENCOUNTER: ICD-10-CM

## 2024-07-15 DIAGNOSIS — J45.20 MILD INTERMITTENT ASTHMA WITHOUT COMPLICATION: ICD-10-CM

## 2024-07-15 DIAGNOSIS — Z00.00 ROUTINE HEALTH MAINTENANCE: ICD-10-CM

## 2024-07-15 DIAGNOSIS — R79.89 ABNORMAL THYROID BLOOD TEST: ICD-10-CM

## 2024-07-15 DIAGNOSIS — R94.6 ABNORMAL RESULTS OF THYROID FUNCTION STUDIES: ICD-10-CM

## 2024-07-15 DIAGNOSIS — J30.2 SEASONAL ALLERGIC RHINITIS, UNSPECIFIED TRIGGER: ICD-10-CM

## 2024-07-15 DIAGNOSIS — G47.33 OSA (OBSTRUCTIVE SLEEP APNEA): ICD-10-CM

## 2024-07-15 DIAGNOSIS — E55.9 VITAMIN D DEFICIENCY: ICD-10-CM

## 2024-07-15 DIAGNOSIS — K20.0 EOSINOPHILIC ESOPHAGITIS: ICD-10-CM

## 2024-07-15 DIAGNOSIS — E53.8 B12 DEFICIENCY: ICD-10-CM

## 2024-07-15 DIAGNOSIS — Z76.89 ENCOUNTER TO ESTABLISH CARE: Primary | ICD-10-CM

## 2024-07-15 DIAGNOSIS — M65.331 TRIGGER MIDDLE FINGER OF RIGHT HAND: ICD-10-CM

## 2024-07-15 LAB
25(OH)D3 SERPL-MCNC: 57.4 NG/ML (ref 30–100)
ALBUMIN SERPL-MCNC: 4.5 G/DL (ref 3.5–5.2)
ALBUMIN/GLOB SERPL: 1.7 G/DL
ALP SERPL-CCNC: 56 U/L (ref 39–117)
ALT SERPL W P-5'-P-CCNC: 31 U/L (ref 1–33)
ANION GAP SERPL CALCULATED.3IONS-SCNC: 10.8 MMOL/L (ref 5–15)
AST SERPL-CCNC: 35 U/L (ref 1–32)
BASOPHILS # BLD AUTO: 0.05 10*3/MM3 (ref 0–0.2)
BASOPHILS NFR BLD AUTO: 1.4 % (ref 0–1.5)
BILIRUB SERPL-MCNC: 0.5 MG/DL (ref 0–1.2)
BUN SERPL-MCNC: 12 MG/DL (ref 8–23)
BUN/CREAT SERPL: 14.3 (ref 7–25)
CALCIUM SPEC-SCNC: 9.6 MG/DL (ref 8.6–10.5)
CHLORIDE SERPL-SCNC: 108 MMOL/L (ref 98–107)
CHOLEST SERPL-MCNC: 132 MG/DL (ref 0–200)
CO2 SERPL-SCNC: 26.2 MMOL/L (ref 22–29)
CREAT SERPL-MCNC: 0.84 MG/DL (ref 0.57–1)
DEPRECATED RDW RBC AUTO: 40.9 FL (ref 37–54)
EGFRCR SERPLBLD CKD-EPI 2021: 73.5 ML/MIN/1.73
EOSINOPHIL # BLD AUTO: 0.16 10*3/MM3 (ref 0–0.4)
EOSINOPHIL NFR BLD AUTO: 4.6 % (ref 0.3–6.2)
ERYTHROCYTE [DISTWIDTH] IN BLOOD BY AUTOMATED COUNT: 12.5 % (ref 12.3–15.4)
GLOBULIN UR ELPH-MCNC: 2.7 GM/DL
GLUCOSE SERPL-MCNC: 89 MG/DL (ref 65–99)
HCT VFR BLD AUTO: 43.8 % (ref 34–46.6)
HDLC SERPL-MCNC: 79 MG/DL (ref 40–60)
HGB BLD-MCNC: 14.2 G/DL (ref 12–15.9)
IMM GRANULOCYTES # BLD AUTO: 0.01 10*3/MM3 (ref 0–0.05)
IMM GRANULOCYTES NFR BLD AUTO: 0.3 % (ref 0–0.5)
LDLC SERPL CALC-MCNC: 43 MG/DL (ref 0–100)
LDLC/HDLC SERPL: 0.57 {RATIO}
LYMPHOCYTES # BLD AUTO: 1.22 10*3/MM3 (ref 0.7–3.1)
LYMPHOCYTES NFR BLD AUTO: 35.1 % (ref 19.6–45.3)
MCH RBC QN AUTO: 29.2 PG (ref 26.6–33)
MCHC RBC AUTO-ENTMCNC: 32.4 G/DL (ref 31.5–35.7)
MCV RBC AUTO: 89.9 FL (ref 79–97)
MONOCYTES # BLD AUTO: 0.27 10*3/MM3 (ref 0.1–0.9)
MONOCYTES NFR BLD AUTO: 7.8 % (ref 5–12)
NEUTROPHILS NFR BLD AUTO: 1.77 10*3/MM3 (ref 1.7–7)
NEUTROPHILS NFR BLD AUTO: 50.8 % (ref 42.7–76)
NRBC BLD AUTO-RTO: 0 /100 WBC (ref 0–0.2)
PLATELET # BLD AUTO: 187 10*3/MM3 (ref 140–450)
PMV BLD AUTO: 10.3 FL (ref 6–12)
POTASSIUM SERPL-SCNC: 4.4 MMOL/L (ref 3.5–5.2)
PROT SERPL-MCNC: 7.2 G/DL (ref 6–8.5)
RBC # BLD AUTO: 4.87 10*6/MM3 (ref 3.77–5.28)
SODIUM SERPL-SCNC: 145 MMOL/L (ref 136–145)
TRIGL SERPL-MCNC: 39 MG/DL (ref 0–150)
TSH SERPL DL<=0.05 MIU/L-ACNC: 2.2 UIU/ML (ref 0.27–4.2)
VIT B12 BLD-MCNC: 924 PG/ML (ref 211–946)
VLDLC SERPL-MCNC: 10 MG/DL (ref 5–40)
WBC NRBC COR # BLD AUTO: 3.48 10*3/MM3 (ref 3.4–10.8)

## 2024-07-15 PROCEDURE — 84443 ASSAY THYROID STIM HORMONE: CPT | Performed by: STUDENT IN AN ORGANIZED HEALTH CARE EDUCATION/TRAINING PROGRAM

## 2024-07-15 PROCEDURE — 82607 VITAMIN B-12: CPT | Performed by: STUDENT IN AN ORGANIZED HEALTH CARE EDUCATION/TRAINING PROGRAM

## 2024-07-15 PROCEDURE — G2211 COMPLEX E/M VISIT ADD ON: HCPCS | Performed by: STUDENT IN AN ORGANIZED HEALTH CARE EDUCATION/TRAINING PROGRAM

## 2024-07-15 PROCEDURE — 1126F AMNT PAIN NOTED NONE PRSNT: CPT | Performed by: STUDENT IN AN ORGANIZED HEALTH CARE EDUCATION/TRAINING PROGRAM

## 2024-07-15 PROCEDURE — 80053 COMPREHEN METABOLIC PANEL: CPT | Performed by: STUDENT IN AN ORGANIZED HEALTH CARE EDUCATION/TRAINING PROGRAM

## 2024-07-15 PROCEDURE — 85025 COMPLETE CBC W/AUTO DIFF WBC: CPT | Performed by: STUDENT IN AN ORGANIZED HEALTH CARE EDUCATION/TRAINING PROGRAM

## 2024-07-15 PROCEDURE — 36415 COLL VENOUS BLD VENIPUNCTURE: CPT | Performed by: STUDENT IN AN ORGANIZED HEALTH CARE EDUCATION/TRAINING PROGRAM

## 2024-07-15 PROCEDURE — 1160F RVW MEDS BY RX/DR IN RCRD: CPT | Performed by: STUDENT IN AN ORGANIZED HEALTH CARE EDUCATION/TRAINING PROGRAM

## 2024-07-15 PROCEDURE — 1159F MED LIST DOCD IN RCRD: CPT | Performed by: STUDENT IN AN ORGANIZED HEALTH CARE EDUCATION/TRAINING PROGRAM

## 2024-07-15 PROCEDURE — 80061 LIPID PANEL: CPT | Performed by: STUDENT IN AN ORGANIZED HEALTH CARE EDUCATION/TRAINING PROGRAM

## 2024-07-15 PROCEDURE — 82306 VITAMIN D 25 HYDROXY: CPT | Performed by: STUDENT IN AN ORGANIZED HEALTH CARE EDUCATION/TRAINING PROGRAM

## 2024-07-15 PROCEDURE — 99215 OFFICE O/P EST HI 40 MIN: CPT | Performed by: STUDENT IN AN ORGANIZED HEALTH CARE EDUCATION/TRAINING PROGRAM

## 2024-07-15 RX ORDER — SCOLOPAMINE TRANSDERMAL SYSTEM 1 MG/1
1 PATCH, EXTENDED RELEASE TRANSDERMAL
Qty: 4 EACH | Refills: 0 | Status: SHIPPED | OUTPATIENT
Start: 2024-07-15

## 2024-07-15 NOTE — PROGRESS NOTES
New Patient Office Visit      Date: 07/15/2024   Patient Name: Tatyana Willett  : 1950   MRN: [id number]     Chief Complaint:    Chief Complaint   Patient presents with    Establish Care       History of Present Illness: Tatyana Willett is a 73 y.o. female who is here today to establish care. Prior PCP Dr. Stepan Ragland.     HPI  History of Present Illness  The patient presents for health maintenance.    She maintains an active lifestyle, including daily walks with her dog, yard work, and house cleaning. She also engages in stretching exercises. Her diet includes three meals daily, including fruit and yogurt with granola for breakfast, a light lunch, and dinner. She avoids snacking and consumes coffee, water, decaffeinated iced tea, and a glass of orange juice in the morning. She gave up sodas in 2007 and occasionally drinks ginger ale. Her dinner typically includes a salad, vegetables, and rice or potatoes. She occasionally experiences heartburn, for which she takes over-the-counter Tums. Denies any dysphagia    She experiences joint aches and pains, which she attributes to overexertion. She occasionally takes Tylenol for pain relief and continues to take glucosamine and chondroitin.    She is under the care of Dr. Isaacs from orthopedics for osteoarthritis and Dr. Crews of cardiology for a questionable patent foramen ovale. She was prescribed atorvastatin 40 mg, which she takes daily. She also takes calcium and vitamin D supplements.    She is under the care of Dr. Mueller for gynecology and Dr. De La Cruz for ophthalmology. She has a history of eosinophilic esophagitis but does not have trouble swallowing. She uses albuterol as needed, Flonase as needed for allergies, Claritin, and Singulair regularly. She recently ran out of Claritin and noticed a difference. She has a history of carpal tunnel syndrome on both sides and a fracture in her left pinky finger in . She has trigger finger and has  received two cortisone injections with Dr. Sharpe in the past. She was advised that the next step would be surgery, but she prefers to wait due to a reaction to anesthesia during her cataract surgery. She has a history of squamous cell carcinoma and basal cell carcinoma on her left leg. She had a tonsillectomy in her youth and had left leg vein surgery in 1990. She had a colonoscopy in 09/2023, which revealed small polyps. She denies any difficulty swallowing. She sees a dentist regularly.    Supplemental Information  She had an upper respiratory infection and could not hear in her right ear for about a month.  She had cataract surgery. She saw Dr. Baker for Pap therapy for ANNETTA. She saw Dr. Dickinson for dermatology recently.   Her mother had severe osteoarthritis, osteoporosis, and dementia. Her father passed away from esophageal cancer secondary to Keller's esophagus. Her maternal grandmother had pancreatic cancer. Her paternal grandmother had heart failure and paternal grandfather had a heart attack. She denies any family history of breast cancer, ovarian cancer, uterine cancer, or colon cancer. Her brothers had hip replacements.   She had a Tdap and TD in 2018. She is up-to-date on her pneumonia vaccine and Shingrix vaccines.    Specialists:   Ortho: patty Simmons   Cardiology: Dr. Ba Crews (recommended yearly basis)  Gyn: Dr. Elizabeth Willett  GI: Dr. Sosa  Hand Surgery: Dr. Sharpe    Subjective      Review of Systems:   Review of Systems    Past Medical History:   Past Medical History:   Diagnosis Date    Allergic     Allergic rhinitis Lifelong    Moderately severe with nasal polyps    Asthma 2011    Severe flare with bronchoscopy from pine needle exposure    CTS (carpal tunnel syndrome) 2004    Bilateral    Eosinophilic esophagitis 2018    Biopsy-positive    Fracture, finger 2009    left pinkie    GERD (gastroesophageal reflux disease) 2016    HL (hearing loss)     Hyperlipidemia     Knee swelling      Lumbar scoliosis 2013    Recurrent discomfort    Lumbar spinal stenosis 2013    Migraine 2004    hospitalized    Mononucleosis 1962    Osteoarthritis of hands, bilateral     PFO (patent foramen ovale) 02/15/2023    Post menopausal syndrome 2000    Hot flashes with impaired sleep    Preventative health care 2016    Sleep apnea 2018    Sleep disorder     TIA (transient ischemic attack) 08/10/2023    Varicose vein of leg 1990    venous ligation left leg       Past Surgical History:   Past Surgical History:   Procedure Laterality Date    BRONCHOSCOPY  2011    Severe allergic pneumonitis    COLONOSCOPY      Benign study    FRACTURE SURGERY      left pinkie finger    HAND SURGERY  2009    left pinkie    LARYNGOSCOPY  2017    Indirect exam    SKIN SURGERY      BASIL CELL    SQUAMOUS CELL CARCINOMA EXCISION Left     L leg    TONSILLECTOMY  1961    TONSILLECTOMY      VEIN SURGERY Left     Ligation of left leg       Family History:   Family History   Problem Relation Age of Onset    Dementia Mother 70 - 79    Hearing loss Mother     Osteoarthritis Mother     Osteoporosis Mother     Esophageal cancer Father           age 60    Cancer Father         Keller's esophagus    Achalasia Brother     Osteoarthritis Brother     Other Brother         pancreatic issues    Osteoarthritis Brother         Bilateral hip replacements    Pancreatic cancer Maternal Grandmother     Cancer Maternal Grandmother         Pancreatic    Heart failure Paternal Grandmother     Heart attack Paternal Grandfather          age 65    Heart disease Paternal Grandfather     Breast cancer Neg Hx     Ovarian cancer Neg Hx     Uterine cancer Neg Hx     Colon cancer Neg Hx        Social History:   Social History     Socioeconomic History    Marital status:    Tobacco Use    Smoking status: Never     Passive exposure: Never    Smokeless tobacco: Never   Vaping Use    Vaping status: Never Used   Substance and Sexual Activity     Alcohol use: No    Drug use: Never    Sexual activity: Yes     Partners: Male     Birth control/protection: Post-menopausal       Medications:     Current Outpatient Medications:     albuterol sulfate  (90 Base) MCG/ACT inhaler, Inhale 2 puffs Every 4 (Four) Hours As Needed for Wheezing or Shortness of Air., Disp: 18 g, Rfl: 1    aspirin 81 MG chewable tablet, Chew 1 tablet Daily., Disp: 30 tablet, Rfl: 0    atorvastatin (LIPITOR) 40 MG tablet, TAKE ONE TABLET BY MOUTH ONCE NIGHTLY, Disp: 90 tablet, Rfl: 3    Calcium Carbonate-Vitamin D (CALTRATE 600+D PO), Take 1 tablet by mouth Daily., Disp: , Rfl:     fluticasone (FLONASE) 50 MCG/ACT nasal spray, 1 spray into the nostril(s) as directed by provider Every Morning., Disp: , Rfl:     Glucosamine-Chondroit-Vit C-Mn (GLUCOSAMINE 1500 COMPLEX PO), Take 1 tablet by mouth daily., Disp: , Rfl:     loratadine (CLARITIN) 10 MG tablet, Take 1 tablet by mouth Daily As Needed., Disp: , Rfl:     montelukast (SINGULAIR) 10 MG tablet, Take 1 tablet by mouth Daily As Needed (as needed)., Disp: 90 tablet, Rfl: 1    Multiple Vitamins-Minerals (WOMENS ONE DAILY) tablet, Take 1 tablet by mouth Daily., Disp: , Rfl:     Scopolamine 1 MG/3DAYS patch, Place 1 patch on the skin as directed by provider Every 72 (Seventy-Two) Hours. Apply at least 4 hours prior to event, do not use for more than 6 consecutive days., Disp: 4 each, Rfl: 0    vitamin C (ASCORBIC ACID) 500 MG tablet, Take 1 tablet by mouth Daily., Disp: , Rfl:     Vitamin D, Cholecalciferol, 25 MCG (1000 UT) capsule, Take 1 capsule by mouth Daily., Disp: , Rfl:     Allergies:   Allergies   Allergen Reactions    Shellfish Allergy Hives     oysters    Poison Ivy Extract [Poison Ivy Extract] Rash       Immunizations:  Td/Tdap(Booster Q 10 yrs):  UTD  Flu (Yearly):  recommend annually in fall  Pneumonia: UTD (can get another dose of PCV 20 at least 5 years after last dose, not due)  Shingrix: Done  RSV: due  Immunization  History   Administered Date(s) Administered    COVID-19 (MODERNA) 1st,2nd,3rd Dose Monovalent 04/25/2022    COVID-19 (MODERNA) BIVALENT 12+YRS 09/20/2022    COVID-19 (PFIZER) Purple Cap Monovalent 12/17/2020, 01/07/2021, 08/25/2021    FLUAD TRI 65YR+ 10/21/2019    FluMist 2-49yrs 10/01/2016, 10/01/2017    Fluad Quad 65+ 10/22/2021    Fluzone High Dose =>65 Years ([employer] ONLY) 10/08/2018    Fluzone High-Dose 65+yrs 10/03/2022, 09/28/2023    Influenza, Unspecified 10/01/2021    Pneumococcal Conjugate 13-Valent (PCV13) 06/03/2021    Pneumococcal Polysaccharide (PPSV23) 03/21/2011, 05/28/2020, 06/01/2021    Shingrix 01/11/2023, 04/06/2023    TD Preservative Free (Tenivac) 10/08/2018    Td (TDVAX) 03/29/2004    Tdap 03/20/2008    Zostavax 04/25/2016       Colorectal Screening:   UTD with Dr. Sosa  Last Completed Colonoscopy            COLORECTAL CANCER SCREENING (COLONOSCOPY - Every 10 Years) Next due on 9/27/2033 09/27/2023  Outside Claim: [place] COLSC FLX W/RMVL OF TUMOR POLYP LESION SNARE TQ    08/04/2013  COLONOSCOPY (Done)                  Pap:   UTD  Last Completed Pap Smear            PAP SMEAR (Every 3 Years) Next due on 6/8/2026 06/08/2023  LIQUID-BASED PAP SMEAR WITH HPV GENOTYPING IF ASCUS ([facility])    06/03/2019  Patient-Reported (Performed Externally)                  Mammogram:  UTD  Last Completed Mammogram            MAMMOGRAM (Every 2 Years) Next due on 3/25/2026      03/25/2024  Outside Claim: HC MAMMOGRAM SCREENING BILAT DIGITAL W CAD    03/25/2024  Outside Claim: CHG SCREENING DIGITAL BREAST TOMOSYNTHESIS BI    03/21/2024  Mammo Screening Digital Tomosynthesis Bilateral With CAD    03/17/2023  Mammo Diagnostic Digital Tomosynthesis Bilateral With CAD    01/30/2023  Mammo Screening Digital Tomosynthesis Bilateral With CAD    Only the first 5 history entries have been loaded, but more history exists.                       Bone Density/DEXA (Age 65 or high risk): Prior osteopenia 3/30/22,  "recommended f/u in 2-3 years    Hep C (Age 18-79 once):  NR      Lipid panel: due  The ASCVD Risk score (Dario DK, et al., 2019) failed to calculate for the following reasons:    The patient has a prior MI or stroke diagnosis    Dermatology: saw on 6/13/24, Aron CARRINGTON at [facility]  Ophthalmologist: regularly  Dentist: regularly    Tobacco Use: Low Risk  (7/15/2024)    Patient History     Smoking Tobacco Use: Never     Smokeless Tobacco Use: Never     Passive Exposure: Never       Social History     Substance and Sexual Activity   Alcohol Use No       Social History     Substance and Sexual Activity   Drug Use Never        Diet/Physical activity:  Walking daily, doing stretching and other exercise. Eating well balanced diet      Menopause: yes    PHQ-2 Depression Screening  PHQ-9 Total Score: 0         Objective     Physical Exam:  Vital Signs:   Vitals:    07/15/24 0918   BP: 110/80   BP Location: Left arm   Patient Position: Sitting   Cuff Size: Adult   Pulse: 61   Resp: 20   Temp: 97.5 °F (36.4 °C)   TempSrc: Temporal   SpO2: 98%   Weight: 62.8 kg (138 lb 8 oz)   Height: 166 cm (65.35\")   PainSc: 0-No pain     Body mass index is 22.8 kg/m².    Physical Exam  Vitals reviewed.   Constitutional:       General: She is not in acute distress.     Appearance: Normal appearance. She is normal weight. She is not ill-appearing or toxic-appearing.   HENT:      Head: Normocephalic and atraumatic.      Right Ear: External ear normal.      Left Ear: External ear normal.      Nose: Nose normal. No congestion.      Mouth/Throat:      Mouth: Mucous membranes are moist.      Pharynx: No oropharyngeal exudate or posterior oropharyngeal erythema.   Eyes:      General: No scleral icterus.     Extraocular Movements: Extraocular movements intact.      Pupils: Pupils are equal, round, and reactive to light.   Cardiovascular:      Rate and Rhythm: Normal rate and regular rhythm.      Pulses: Normal pulses.      Heart sounds: Normal heart " sounds.   Pulmonary:      Effort: Pulmonary effort is normal.      Breath sounds: Normal breath sounds.   Abdominal:      General: Abdomen is flat. Bowel sounds are normal. There is no distension.      Palpations: Abdomen is soft. There is no mass.      Tenderness: There is no abdominal tenderness. There is no guarding or rebound.   Musculoskeletal:         General: No swelling or tenderness. Normal range of motion.      Cervical back: Normal range of motion and neck supple. No rigidity or tenderness.   Lymphadenopathy:      Cervical: No cervical adenopathy.   Skin:     General: Skin is warm and dry.      Capillary Refill: Capillary refill takes less than 2 seconds.      Findings: No erythema or rash.   Neurological:      General: No focal deficit present.      Mental Status: She is alert and oriented to person, place, and time.   Psychiatric:         Mood and Affect: Mood normal.         Behavior: Behavior normal.         Judgment: Judgment normal.       Physical Exam  Vital Signs  BMI is 22.8.      Procedures    Results:     Labs:   Hemoglobin A1C   Date Value Ref Range Status   01/16/2023 4.90 4.80 - 5.60 % Final     TSH   Date Value Ref Range Status   07/31/2023 3.340 0.270 - 4.200 uIU/mL Final      Results  Imaging  Bone density scan in March 2022 showed osteopenia.      Imaging:   No valid procedures specified.     Assessment / Plan      Assessment/Plan:   Problem List Items Addressed This Visit       Atopic rhinitis    Asthma    Overview     Impression: 06/14/2014 - 2010 2000 severe flare with eventual bronchoscopy;          Eosinophilic esophagitis    ANNETTA (obstructive sleep apnea)    Overview     Thinks more stress than anything, after retired stopped having any issues at all         History of transient ischemic attack (TIA)    Overview     Possible TIA s/p tPA MRI brain 1/16/2023  Echo (1/16/2023): Possible positive bubble study.  Normal LVEF.  Calcification aortic valve         Relevant Orders    Lipid  Panel    Trigger middle finger of right hand     Other Visit Diagnoses       Encounter to establish care    -  Primary    Routine health maintenance        Relevant Orders    CBC Auto Differential    Comprehensive Metabolic Panel    TSH Rfx On Abnormal To Free T4    Asymptomatic postmenopausal state        Relevant Orders    DEXA Bone Density Axial    Screening for ischemic heart disease        Relevant Orders    Lipid Panel    B12 deficiency        Relevant Orders    Vitamin B12    Vitamin D deficiency        Relevant Orders    Vitamin D 25 hydroxy    Abnormal thyroid blood test        Relevant Orders    TSH Rfx On Abnormal To Free T4    Motion sickness, initial encounter        Relevant Medications    Scopolamine 1 MG/3DAYS patch    Encounter for vaccination        Relevant Medications    RSVPreF3 Vac Recomb Adjuvanted (AREXVY) 120 MCG/0.5ML reconstituted suspension injection    Abnormal results of thyroid function studies        Relevant Orders    TSH Rfx On Abnormal To Free T4          Assessment & Plan  1. Health maintenance.  Her Pap smear and mammogram are up-to-date. A bone density scan conducted in 03/2022 revealed osteopenia. Influenza vaccine is recommended in the fall. Prevnar 20 will be administered in 2026. RSV vaccine is recommended due to her history of asthma. Colonoscopy records will be requested. Cholesterol, CMP, CBC, and B12 levels will be checked. TSH with a reflex and free T4 will be checked. Scopolamine patch will be refilled. A DEXA scan will be ordered.    2. Trigger finger.  A referral to Dr. Cervantes will be made in the future if patient decides to pursue further therapy    Follow-up  A follow-up visit is scheduled for 6 months from now for a Medicare wellness visit.      Healthcare Maintenance:  Counseling provided based on age appropriate USPSTF guidelines.  BMI is within normal parameters. No other follow-up for BMI required.    Tatyana Willett voices understanding and acceptance of  this advice and will call back with any further questions or concerns. AVS with preventive healthcare tips printed for patient.     “Discussed risks/benefits to vaccination, reviewed components of the vaccine, discussed VIS, discussed informed consent, informed consent obtained. Patient/Parent was allowed to accept or refuse vaccine. Questions answered to satisfactory state of patient/Parent. We reviewed typical age appropriate and seasonally appropriate vaccinations. Reviewed immunization history and updated state vaccination form as needed. Patient was counseled on COVID-19  Influenza  RSV    Follow Up:   Return in about 6 months (around 1/15/2025) for Medicare Wellness.    I spent 50 minutes caring for Tatyana on this date of service. This time includes time spent by me in the following activities: preparing for the visit, reviewing tests, performing a medically appropriate examination and/or evaluation, counseling and educating the patient/family/caregiver, documenting information in the medical record, ordering medications, and ordering test(s)        Lit Gallagher MD     [facility] Petar Ingram     Patient or patient representative verbalized consent for the use of Ambient Listening during the visit with  Lit Gallagher MD for chart documentation. 7/15/2024  09:56 EDT

## 2024-07-15 NOTE — PATIENT INSTRUCTIONS
Health Maintenance, Female  Adopting a healthy lifestyle and getting preventive care can go a long way to promote health and wellness. Talk with your health care provider about what schedule of regular examinations is right for you. This is a good chance for you to check in with your provider about disease prevention and staying healthy.  In between checkups, there are plenty of things you can do on your own. Experts have done a lot of research about which lifestyle changes and preventive measures are most likely to keep you healthy. Ask your health care provider for more information.  Weight and diet  Eat a healthy diet  Be sure to include plenty of vegetables, fruits, low-fat dairy products, and lean protein.  Do not eat a lot of foods high in solid fats, added sugars, or salt.  Get regular exercise. This is one of the most important things you can do for your health.  Most adults should exercise for at least 150 minutes each week. The exercise should increase your heart rate and make you sweat (moderate-intensity exercise).  Most adults should also do strengthening exercises at least twice a week. This is in addition to the moderate-intensity exercise.     Maintain a healthy weight  Body mass index (BMI) is a measurement that can be used to identify possible weight problems. It estimates body fat based on height and weight. Your health care provider can help determine your BMI and help you achieve or maintain a healthy weight.  For females 20 years of age and older:  A BMI below 18.5 is considered underweight.  A BMI of 18.5 to 24.9 is normal.  A BMI of 25 to 29.9 is considered overweight.  A BMI of 30 and above is considered obese.     Watch levels of cholesterol and blood lipids  You should start having your blood tested for lipids and cholesterol at 20 years of age, then have this test every 5 years.  You may need to have your cholesterol levels checked more often if:  Your lipid or cholesterol levels are  high.  You are older than 50 years of age.  You are at high risk for heart disease.     Cancer screening  Lung Cancer  Lung cancer screening is recommended for adults 55-80 years old who are at high risk for lung cancer because of a history of smoking.  A yearly low-dose CT scan of the lungs is recommended for people who:  Currently smoke.  Have quit within the past 15 years.  Have at least a 30-pack-year history of smoking. A pack year is smoking an average of one pack of cigarettes a day for 1 year.  Yearly screening should continue until it has been 15 years since you quit.  Yearly screening should stop if you develop a health problem that would prevent you from having lung cancer treatment.     Breast Cancer  Practice breast self-awareness. This means understanding how your breasts normally appear and feel.  It also means doing regular breast self-exams. Let your health care provider know about any changes, no matter how small.  If you are in your 20s or 30s, you should have a clinical breast exam (CBE) by a health care provider every 1-3 years as part of a regular health exam.  If you are 40 or older, have a CBE every year. Also consider having a breast X-ray (mammogram) every year.  If you have a family history of breast cancer, talk to your health care provider about genetic screening.  If you are at high risk for breast cancer, talk to your health care provider about having an MRI and a mammogram every year.  Breast cancer gene (BRCA) assessment is recommended for women who have family members with BRCA-related cancers. BRCA-related cancers include:  Breast.  Ovarian.  Tubal.  Peritoneal cancers.  Results of the assessment will determine the need for genetic counseling and BRCA1 and BRCA2 testing.     Cervical Cancer  Your health care provider may recommend that you be screened regularly for cancer of the pelvic organs (ovaries, uterus, and vagina). This screening involves a pelvic examination, including  checking for microscopic changes to the surface of your cervix (Pap test). You may be encouraged to have this screening done every 3 years, beginning at age 21.  For women ages 30-65, health care providers may recommend pelvic exams and Pap testing every 3 years, or they may recommend the Pap and pelvic exam, combined with testing for human papilloma virus (HPV), every 5 years. Some types of HPV increase your risk of cervical cancer. Testing for HPV may also be done on women of any age with unclear Pap test results.  Other health care providers may not recommend any screening for nonpregnant women who are considered low risk for pelvic cancer and who do not have symptoms. Ask your health care provider if a screening pelvic exam is right for you.  If you have had past treatment for cervical cancer or a condition that could lead to cancer, you need Pap tests and screening for cancer for at least 20 years after your treatment. If Pap tests have been discontinued, your risk factors (such as having a new sexual partner) need to be reassessed to determine if screening should resume. Some women have medical problems that increase the chance of getting cervical cancer. In these cases, your health care provider may recommend more frequent screening and Pap tests.     Colorectal Cancer  This type of cancer can be detected and often prevented.  Routine colorectal cancer screening usually begins at 50 years of age and continues through 75 years of age.  Your health care provider may recommend screening at an earlier age if you have risk factors for colon cancer.  Your health care provider may also recommend using home test kits to check for hidden blood in the stool.  A small camera at the end of a tube can be used to examine your colon directly (sigmoidoscopy or colonoscopy). This is done to check for the earliest forms of colorectal cancer.  Routine screening usually begins at age 50.  Direct examination of the colon should  be repeated every 5-10 years through 75 years of age. However, you may need to be screened more often if early forms of precancerous polyps or small growths are found.     Skin Cancer  Check your skin from head to toe regularly.  Tell your health care provider about any new moles or changes in moles, especially if there is a change in a mole's shape or color.  Also tell your health care provider if you have a mole that is larger than the size of a pencil eraser.  Always use sunscreen. Apply sunscreen liberally and repeatedly throughout the day.  Protect yourself by wearing long sleeves, pants, a wide-brimmed hat, and sunglasses whenever you are outside.     Heart disease, diabetes, and high blood pressure  High blood pressure causes heart disease and increases the risk of stroke. High blood pressure is more likely to develop in:  People who have blood pressure in the high end of the normal range (130-139/85-89 mm Hg).  People who are overweight or obese.  People who are .  If you are 18-39 years of age, have your blood pressure checked every 3-5 years. If you are 40 years of age or older, have your blood pressure checked every year. You should have your blood pressure measured twice--once when you are at a hospital or clinic, and once when you are not at a hospital or clinic. Record the average of the two measurements. To check your blood pressure when you are not at a hospital or clinic, you can use:  An automated blood pressure machine at a pharmacy.  A home blood pressure monitor.  If you are between 55 years and 79 years old, ask your health care provider if you should take aspirin to prevent strokes.  Have regular diabetes screenings. This involves taking a blood sample to check your fasting blood sugar level.  If you are at a normal weight and have a low risk for diabetes, have this test once every three years after 45 years of age.  If you are overweight and have a high risk for diabetes,  consider being tested at a younger age or more often.  Preventing infection  Hepatitis B  If you have a higher risk for hepatitis B, you should be screened for this virus. You are considered at high risk for hepatitis B if:  You were born in a country where hepatitis B is common. Ask your health care provider which countries are considered high risk.  Your parents were born in a high-risk country, and you have not been immunized against hepatitis B (hepatitis B vaccine).  You have HIV or AIDS.  You use needles to inject street drugs.  You live with someone who has hepatitis B.  You have had sex with someone who has hepatitis B.  You get hemodialysis treatment.  You take certain medicines for conditions, including cancer, organ transplantation, and autoimmune conditions.     Hepatitis C  Blood testing is recommended for:  Everyone born from 1945 through 1965.  Anyone with known risk factors for hepatitis C.     Sexually transmitted infections (STIs)  You should be screened for sexually transmitted infections (STIs) including gonorrhea and chlamydia if:  You are sexually active and are younger than 24 years of age.  You are older than 24 years of age and your health care provider tells you that you are at risk for this type of infection.  Your sexual activity has changed since you were last screened and you are at an increased risk for chlamydia or gonorrhea. Ask your health care provider if you are at risk.  If you do not have HIV, but are at risk, it may be recommended that you take a prescription medicine daily to prevent HIV infection. This is called pre-exposure prophylaxis (PrEP). You are considered at risk if:  You are sexually active and do not regularly use condoms or know the HIV status of your partner(s).  You take drugs by injection.  You are sexually active with a partner who has HIV.     Talk with your health care provider about whether you are at high risk of being infected with HIV. If you choose to  begin PrEP, you should first be tested for HIV. You should then be tested every 3 months for as long as you are taking PrEP.  Pregnancy  If you are premenopausal and you may become pregnant, ask your health care provider about preconception counseling.  If you may become pregnant, take 400 to 800 micrograms (mcg) of folic acid every day.  If you want to prevent pregnancy, talk to your health care provider about birth control (contraception).  Osteoporosis and menopause  Osteoporosis is a disease in which the bones lose minerals and strength with aging. This can result in serious bone fractures. Your risk for osteoporosis can be identified using a bone density scan.  If you are 65 years of age or older, or if you are at risk for osteoporosis and fractures, ask your health care provider if you should be screened.  Ask your health care provider whether you should take a calcium or vitamin D supplement to lower your risk for osteoporosis.  Menopause may have certain physical symptoms and risks.  Hormone replacement therapy may reduce some of these symptoms and risks.  Talk to your health care provider about whether hormone replacement therapy is right for you.  Follow these instructions at home:  Schedule regular health, dental, and eye exams.  Stay current with your immunizations.  Do not use any tobacco products including cigarettes, chewing tobacco, or electronic cigarettes.  If you are pregnant, do not drink alcohol.  If you are breastfeeding, limit how much and how often you drink alcohol.  Limit alcohol intake to no more than 1 drink per day for nonpregnant women. One drink equals 12 ounces of beer, 5 ounces of wine, or 1½ ounces of hard liquor.  Do not use street drugs.  Do not share needles.  Ask your health care provider for help if you need support or information about quitting drugs.  Tell your health care provider if you often feel depressed.  Tell your health care provider if you have ever been abused or do  not feel safe at home.  This information is not intended to replace advice given to you by your health care provider. Make sure you discuss any questions you have with your health care provider.  Document Released: 07/02/2012 Document Revised: 05/25/2017 Document Reviewed: 09/20/2016  [employer] Patient Education © 2018 [employer] Healthy Eating  Following a healthy eating pattern may help you to achieve and maintain a healthy body weight, reduce the risk of chronic disease, and live a long and productive life. It is important to follow a healthy eating pattern at an appropriate calorie level for your body. Your nutritional needs should be met primarily through food by choosing a variety of nutrient-rich foods.  What are tips for following this plan?  Reading food labels  Read labels and choose the following:  Reduced or low sodium.  Juices with 100% fruit juice.  Foods with low saturated fats and high polyunsaturated and monounsaturated fats.  Foods with whole grains, such as whole wheat, cracked wheat, brown rice, and wild rice.  Whole grains that are fortified with folic acid. This is recommended for women who are pregnant or who want to become pregnant.  Read labels and avoid the following:  Foods with a lot of added sugars. These include foods that contain brown sugar, corn sweetener, corn syrup, dextrose, fructose, glucose, high-fructose corn syrup, honey, invert sugar, lactose, malt syrup, maltose, molasses, raw sugar, sucrose, trehalose, or turbinado sugar.  Do not eat more than the following amounts of added sugar per day:  6 teaspoons (25 g) for women.  9 teaspoons (38 g) for men.  Foods that contain processed or refined starches and grains.  Refined grain products, such as white flour, degermed cornmeal, white bread, and white rice.  Shopping  Choose nutrient-rich snacks, such as vegetables, whole fruits, and nuts. Avoid high-calorie and high-sugar snacks, such as potato chips, fruit snacks,  "and candy.  Use oil-based dressings and spreads on foods instead of solid fats such as butter, stick margarine, or cream cheese.  Limit pre-made sauces, mixes, and \"instant\" products such as flavored rice, instant noodles, and ready-made pasta.  Try more plant-protein sources, such as tofu, tempeh, black beans, edamame, lentils, nuts, and seeds.  Explore eating plans such as the Mediterranean diet or vegetarian diet.  Cooking  Use oil to sauté or stir-cabrales foods instead of solid fats such as butter, stick margarine, or lard.  Try baking, boiling, grilling, or broiling instead of frying.  Remove the fatty part of meats before cooking.  Steam vegetables in water or broth.  Meal planning    At meals, imagine dividing your plate into fourths:  One-half of your plate is fruits and vegetables.  One-fourth of your plate is whole grains.  One-fourth of your plate is protein, especially lean meats, poultry, eggs, tofu, beans, or nuts.  Include low-fat dairy as part of your daily diet.     Lifestyle  Choose healthy options in all settings, including home, work, school, restaurants, or stores.  Prepare your food safely:  Wash your hands after handling raw meats.  Keep food preparation surfaces clean by regularly washing with hot, soapy water.  Keep raw meats separate from ready-to-eat foods, such as fruits and vegetables.  , meat, poultry, and eggs to the recommended internal temperature.  Store foods at safe temperatures. In general:  Keep cold foods at 40°F (4.4°C) or below.  Keep hot foods at 140°F (60°C) or above.  Keep your freezer at 0°F (-17.8°C) or below.  Foods are no longer safe to eat when they have been between the temperatures of 40°-140°F (4.4-60°C) for more than 2 hours.  What foods should I eat?  Fruits  Aim to eat 2 cup-equivalents of fresh, canned (in natural juice), or frozen fruits each day. Examples of 1 cup-equivalent of fruit include 1 small apple, 8 large strawberries, 1 cup canned fruit, ½ " cup dried fruit, or 1 cup 100% juice.  Vegetables  Aim to eat 2½-3 cup-equivalents of fresh and frozen vegetables each day, including different varieties and colors. Examples of 1 cup-equivalent of vegetables include 2 medium carrots, 2 cups raw, leafy greens, 1 cup chopped vegetable (raw or cooked), or 1 medium baked potato.  Grains  Aim to eat 6 ounce-equivalents of whole grains each day. Examples of 1 ounce-equivalent of grains include 1 slice of bread, 1 cup ready-to-eat cereal, 3 cups popcorn, or ½ cup cooked rice, pasta, or cereal.  Meats and other proteins  Aim to eat 5-6 ounce-equivalents of protein each day. Examples of 1 ounce-equivalent of protein include 1 egg, 1/2 cup nuts or seeds, or 1 tablespoon (16 g) peanut butter. A cut of meat or fish that is the size of a deck of cards is about 3-4 ounce-equivalents.  Of the protein you eat each week, try to have at least 8 ounces come from seafood. This includes salmon, trout, herring, and anchovies.  Dairy  Aim to eat 3 cup-equivalents of fat-free or low-fat dairy each day. Examples of 1 cup-equivalent of dairy include 1 cup (240 mL) milk, 8 ounces (250 g) yogurt, 1½ ounces (44 g) natural cheese, or 1 cup (240 mL) fortified soy milk.  Fats and oils  Aim for about 5 teaspoons (21 g) per day. Choose monounsaturated fats, such as canola and olive oils, avocados, peanut butter, and most nuts, or polyunsaturated fats, such as sunflower, corn, and soybean oils, walnuts, pine nuts, sesame seeds, sunflower seeds, and flaxseed.  Beverages  Aim for six 8-oz glasses of water per day. Limit coffee to three to five 8-oz cups per day.  Limit caffeinated beverages that have added calories, such as soda and energy drinks.  Limit alcohol intake to no more than 1 drink a day for nonpregnant women and 2 drinks a day for men. One drink equals 12 oz of beer (355 mL), 5 oz of wine (148 mL), or 1½ oz of hard liquor (44 mL).  Seasoning and other foods  Avoid adding excess amounts of  salt to your foods. Try flavoring foods with herbs and spices instead of salt.  Avoid adding sugar to foods.  Try using oil-based dressings, sauces, and spreads instead of solid fats.  This information is based on general [place] nutrition guidelines. For more information, visit choosemyplate.gov. Exact amounts may vary based on your nutrition needs.  Summary  A healthy eating plan may help you to maintain a healthy weight, reduce the risk of chronic diseases, and stay active throughout your life.  Plan your meals. Make sure you eat the right portions of a variety of nutrient-rich foods.  Try baking, boiling, grilling, or broiling instead of frying.  Choose healthy options in all settings, including home, work, school, restaurants, or stores.  This information is not intended to replace advice given to you by your health care provider. Make sure you discuss any questions you have with your health care provider.  Document Revised: 04/01/2019 Document Reviewed: 04/01/2019  [employer] Patient Education © 2021 [employer]    Exercising to Stay Healthy  To become healthy and stay healthy, it is recommended that you do moderate-intensity and vigorous-intensity exercise. You can tell that you are exercising at a moderate intensity if your heart starts beating faster and you start breathing faster but can still hold a conversation. You can tell that you are exercising at a vigorous intensity if you are breathing much harder and faster and cannot hold a conversation while exercising.  Exercising regularly is important. It has many health benefits, such as:  Improving overall fitness, flexibility, and endurance.  Increasing bone density.  Helping with weight control.  Decreasing body fat.  Increasing muscle strength.  Reducing stress and tension.  Improving overall health.  How often should I exercise?  Choose an activity that you enjoy, and set realistic goals. Your health care provider can help you make an activity plan that  works for you.  Exercise regularly as told by your health care provider. This may include:  Doing strength training two times a week, such as:  Lifting weights.  Using resistance bands.  Push-ups.  Sit-ups.  Yoga.  Doing a certain intensity of exercise for a given amount of time. Choose from these options:  A total of 150 minutes of moderate-intensity exercise every week.  A total of 75 minutes of vigorous-intensity exercise every week.  A mix of moderate-intensity and vigorous-intensity exercise every week.  Children, pregnant women, people who have not exercised regularly, people who are overweight, and older adults may need to talk with a health care provider about what activities are safe to do. If you have a medical condition, be sure to talk with your health care provider before you start a new exercise program.  What are some exercise ideas?    Moderate-intensity exercise ideas include:  Walking 1 mile (1.6 km) in about 15 minutes.  Biking.  Hiking.  Golfing.  Dancing.  Water aerobics.  Vigorous-intensity exercise ideas include:  Walking 4.5 miles (7.2 km) or more in about 1 hour.  Jogging or running 5 miles (8 km) in about 1 hour.  Biking 10 miles (16.1 km) or more in about 1 hour.  Lap swimming.  Roller-skating or in-line skating.  Cross-country skiing.  Vigorous competitive sports, such as football, basketball, and soccer.  Jumping rope.  Aerobic dancing.  What are some everyday activities that can help me to get exercise?  Yard work, such as:  Pushing a .  Raking and bagging leaves.  Washing your car.  Pushing a stroller.  Shoveling snow.  Gardening.  Washing windows or floors.  How can I be more active in my day-to-day activities?  Use stairs instead of an elevator.  Take a walk during your lunch break.  If you drive, park your car farther away from your work or school.  If you take public transportation, get off one stop early and walk the rest of the way.  Stand up or walk around during all  of your indoor phone calls.  Get up, stretch, and walk around every 30 minutes throughout the day.  Enjoy exercise with a friend. Support to continue exercising will help you keep a regular routine of activity.  What guidelines can I follow while exercising?  Before you start a new exercise program, talk with your health care provider.  Do not exercise so much that you hurt yourself, feel dizzy, or get very short of breath.  Wear comfortable clothes and wear shoes with good support.  Drink plenty of water while you exercise to prevent dehydration or heat stroke.  Work out until your breathing and your heartbeat get faster.  Where to find more information  [place] Department of Health and Human Services: www.hhs.gov  Centers for Disease Control and Prevention (CDC): www.cdc.gov  Summary  Exercising regularly is important. It will improve your overall fitness, flexibility, and endurance.  Regular exercise also will improve your overall health. It can help you control your weight, reduce stress, and improve your bone density.  Do not exercise so much that you hurt yourself, feel dizzy, or get very short of breath.  Before you start a new exercise program, talk with your health care provider.  This information is not intended to replace advice given to you by your health care provider. Make sure you discuss any questions you have with your health care provider.  Document Revised: 11/30/2018 Document Reviewed: 11/08/2018  [employer] Patient Education © 2021 [employer]

## 2024-07-17 ENCOUNTER — PATIENT ROUNDING (BHMG ONLY) (OUTPATIENT)
Dept: INTERNAL MEDICINE | Facility: CLINIC | Age: 74
End: 2024-07-17
Payer: MEDICARE

## 2024-07-17 NOTE — PROGRESS NOTES
A [employer] message has been sent to the patient for patient rounding with [facility].

## 2024-08-14 NOTE — PROGRESS NOTES
Follow-up Visit      Date: 08/15/2024  Patient Name: Tatyana Willett  : 1950   MRN: [id number]     Chief Complaint:    Chief Complaint   Patient presents with    PFO       History of Present Illness: Tatyana Willett is a 74 y.o. female who is here today for follow-up on her TIA-like syndrome.  Patient has been doing fine.  Patient denies any chest pain any shortness of breath.  Patient is able to do all the activities without any problem.  Patient does all the activities without any problem.  She did not have any weakness of the arm or anything.    Patient denies any weight loss or any weight gain.  Patient denies any bleeding.  Patient denies any pain in the legs or time.  Patient wants to drink her grapefruit.  We will switch her statin.      Problem List     CARDIAC  Coronary Artery Disease:   Asymptomatic     Myocardium:   Echo 2023: EF 66-70%  ERON 2023: EF 56-60% positive bubble but location difficult to determine     Valvular:   Trace to mild MR, mild TR     Electrical:   Normal sinus rhythm     Percardium:   Normal     VASCULAR  Arterial  Cerebrovascular disease:   Possible TIA s/p tPA MRI brain 2023: Normal study     VENOUS  Venous duplex 2023: No DVT bilaterally       CARDIAC RISK FACTORS  Dyslipidemia  2024  TG 39 HDL 79 LDL 43  Obstructive Sleep Apnea     NON-CARDIAC  Eosinophilic esophagitis  Asthma  Vertigo     SURGERIES  Tonsillectomy  Basal cell carcinoma excision  Squamous cell carcinoma excision      Subjective      Review of Systems:   Review of Systems   Respiratory:  Negative for apnea, cough, choking, chest tightness, shortness of breath, wheezing and stridor.    Cardiovascular:  Negative for chest pain, palpitations and leg swelling.       Medications:     Current Outpatient Medications:     albuterol sulfate  (90 Base) MCG/ACT inhaler, Inhale 2 puffs Every 4 (Four) Hours As Needed for Wheezing or Shortness of Air., Disp: 18 g, Rfl: 1     "aspirin 81 MG chewable tablet, Chew 1 tablet Daily., Disp: 30 tablet, Rfl: 0    Calcium Carbonate-Vitamin D (CALTRATE 600+D PO), Take 1 tablet by mouth Daily., Disp: , Rfl:     fluticasone (FLONASE) 50 MCG/ACT nasal spray, 1 spray into the nostril(s) as directed by provider Every Morning., Disp: , Rfl:     Glucosamine-Chondroit-Vit C-Mn (GLUCOSAMINE 1500 COMPLEX PO), Take 1 tablet by mouth daily., Disp: , Rfl:     loratadine (CLARITIN) 10 MG tablet, Take 1 tablet by mouth Daily As Needed., Disp: , Rfl:     montelukast (SINGULAIR) 10 MG tablet, Take 1 tablet by mouth Daily As Needed (as needed)., Disp: 90 tablet, Rfl: 1    Multiple Vitamins-Minerals (WOMENS ONE DAILY) tablet, Take 1 tablet by mouth Daily., Disp: , Rfl:     Scopolamine 1 MG/3DAYS patch, Place 1 patch on the skin as directed by provider Every 72 (Seventy-Two) Hours. Apply at least 4 hours prior to event, do not use for more than 6 consecutive days., Disp: 4 each, Rfl: 0    vitamin C (ASCORBIC ACID) 500 MG tablet, Take 1 tablet by mouth Daily., Disp: , Rfl:     Vitamin D, Cholecalciferol, 25 MCG (1000 UT) capsule, Take 1 capsule by mouth Daily., Disp: , Rfl:     rosuvastatin (CRESTOR) 20 MG tablet, Take 1 tablet by mouth Daily., Disp: 90 tablet, Rfl: 3    Allergies:   Allergies   Allergen Reactions    Shellfish Allergy Hives     oysters    Poison Ivy Extract [Poison Ivy Extract] Rash       Objective     Physical Exam:  Vitals:    08/15/24 0934   BP: 116/64   BP Location: Right arm   Patient Position: Sitting   Cuff Size: Adult   Pulse: 57   SpO2: 98%   Weight: 63.1 kg (139 lb 3.2 oz)   Height: 165.1 cm (65\")     Body mass index is 23.16 kg/m².    Constitutional:       General: Not in acute distress.     Appearance: Healthy appearance. Not in distress.     Neck:     JVP:Not elevated     Carotid artery: Normal    Pulmonary:      Effort: Pulmonary effort is normal.      Breath sounds: Normal breath sounds. No wheezing. No rhonchi. No rales. "     Cardiovascular:      Normal rate. Regular rhythm. Normal S1. Normal S2.      Murmurs: There is no murmur.      No gallop. No click. No rub.     Abdominal:      General: Bowel sounds are normal.      Palpations: Abdomen is soft.      Tenderness: There is no abdominal tenderness.    Extremities:     Pulses:Normal radial and pedal pulses     Edema:no edema    Smoking Cessation:   Tobacco Product History : Patient never smoked    Lab Review:   Lab Results   Component Value Date    GLUCOSE 89 07/15/2024    BUN 12 07/15/2024    CREATININE 0.84 07/15/2024    EGFRIFNONA 72 06/03/2021    BCR 14.3 07/15/2024    K 4.4 07/15/2024    CO2 26.2 07/15/2024    CALCIUM 9.6 07/15/2024    ALBUMIN 4.5 07/15/2024    AST 35 (H) 07/15/2024    ALT 31 07/15/2024     Lab Results   Component Value Date    WBC 3.48 07/15/2024    HGB 14.2 07/15/2024    HCT 43.8 07/15/2024    MCV 89.9 07/15/2024     07/15/2024     Lab Results   Component Value Date    TSH 2.200 07/15/2024           ECG 12 Lead    Date/Time: 8/15/2024 1:04 PM  Performed by: Ba Crews MD    Authorized by: Ba Crews MD  Comparison: compared with previous ECG from 1/16/2023  Rhythm: sinus bradycardia           Assessment / Plan      Assessment:   Diagnosis Plan   1. Mixed hyperlipidemia        2. TIA (transient ischemic attack)             Plan:  I have switched her Lipitor to Crestor as she wants to have her grape juice.  Usually none of the statins increases myopathy if the intake of grape fruit is one half a day.  Patient did not seem to have any effect related to the symptoms she had before.  She is very active and continue to do all the activities.  Patient is also on vitamin D which potentially decreases myopathy related to statins.      Follow Up:       Return in about 20 months (around 4/15/2026).    Ba Crews MD

## 2024-08-15 ENCOUNTER — OFFICE VISIT (OUTPATIENT)
Dept: CARDIOLOGY | Facility: CLINIC | Age: 74
End: 2024-08-15
Payer: MEDICARE

## 2024-08-15 VITALS
HEIGHT: 65 IN | BODY MASS INDEX: 23.19 KG/M2 | HEART RATE: 57 BPM | OXYGEN SATURATION: 98 % | DIASTOLIC BLOOD PRESSURE: 64 MMHG | SYSTOLIC BLOOD PRESSURE: 116 MMHG | WEIGHT: 139.2 LBS

## 2024-08-15 DIAGNOSIS — E78.2 MIXED HYPERLIPIDEMIA: Primary | ICD-10-CM

## 2024-08-15 DIAGNOSIS — G45.9 TIA (TRANSIENT ISCHEMIC ATTACK): ICD-10-CM

## 2024-08-15 RX ORDER — ROSUVASTATIN CALCIUM 20 MG/1
20 TABLET, COATED ORAL DAILY
Qty: 90 TABLET | Refills: 3 | Status: SHIPPED | OUTPATIENT
Start: 2024-08-15

## 2024-10-16 ENCOUNTER — TELEPHONE (OUTPATIENT)
Dept: INTERNAL MEDICINE | Facility: CLINIC | Age: 74
End: 2024-10-16
Payer: MEDICARE

## 2024-10-16 NOTE — TELEPHONE ENCOUNTER
Caller: Tatyana Willett    Relationship: Self    Best call back number: [phone]     What is the best time to reach you: ANY    What was the call regarding: TATYANA GOT HER COVID SHOT YESTERDAY,   SHE  DEGREE TEMP, FEELING BAD ALL OVER,    Is it okay if the provider responds through MyChart: CALL

## 2024-10-16 NOTE — TELEPHONE ENCOUNTER
Please notify patient that it is not uncommon to experience low grade temp/fever, myalgias, and malaise following covid vaccination. Recommend tylenol as needed and monitoring symptoms.    Dr. Gallagher

## 2024-10-21 DIAGNOSIS — J30.2 SEASONAL ALLERGIC RHINITIS, UNSPECIFIED TRIGGER: ICD-10-CM

## 2024-10-21 DIAGNOSIS — J45.20 MILD INTERMITTENT ASTHMA WITHOUT COMPLICATION: ICD-10-CM

## 2024-10-21 RX ORDER — MONTELUKAST SODIUM 10 MG/1
10 TABLET ORAL DAILY PRN
Qty: 90 TABLET | Refills: 1 | OUTPATIENT
Start: 2024-10-21

## 2024-10-21 NOTE — TELEPHONE ENCOUNTER
Rx Refill Note  Requested Prescriptions     Pending Prescriptions Disp Refills    montelukast (SINGULAIR) 10 MG tablet [Pharmacy Med Name: MONTELUKAST SOD 10 MG TABLET] 90 tablet 1     Sig: TAKE 1 TABLET BY MOUTH DAILY AS NEEDED      Last office visit with prescribing clinician: 9/18/2023   Last telemedicine visit with prescribing clinician: Visit date not found   Next office visit with prescribing clinician: Visit date not found                         Would you like a call back once the refill request has been completed: [] Yes [] No    If the office needs to give you a call back, can they leave a voicemail: [] Yes [] No    Mia Mendieta MA  10/21/24, 14:54 EDT

## 2024-10-31 DIAGNOSIS — J45.20 MILD INTERMITTENT ASTHMA WITHOUT COMPLICATION: ICD-10-CM

## 2024-10-31 DIAGNOSIS — J30.2 SEASONAL ALLERGIC RHINITIS, UNSPECIFIED TRIGGER: ICD-10-CM

## 2024-10-31 RX ORDER — MONTELUKAST SODIUM 10 MG/1
10 TABLET ORAL DAILY PRN
Qty: 90 TABLET | Refills: 1 | Status: SHIPPED | OUTPATIENT
Start: 2024-10-31

## 2024-10-31 RX ORDER — MONTELUKAST SODIUM 10 MG/1
10 TABLET ORAL DAILY PRN
Qty: 90 TABLET | Refills: 1 | OUTPATIENT
Start: 2024-10-31

## 2024-10-31 NOTE — TELEPHONE ENCOUNTER
Caller: Tatyana Willett    Relationship: Self    Best call back number: [phone]    Requested Prescriptions:   Requested Prescriptions     Pending Prescriptions Disp Refills    montelukast (SINGULAIR) 10 MG tablet 90 tablet 1     Sig: Take 1 tablet by mouth Daily As Needed (as needed).        Pharmacy where request should be sent: [facility] [id number] [address] [facility] [phone] [facility] [phone]      Last office visit with prescribing clinician: 7/15/2024   Last telemedicine visit with prescribing clinician: Visit date not found   Next office visit with prescribing clinician: 1/21/2025     Additional details provided by patient: THE PATIENT NEEDS A REFILL SHE STATES THAT IT WAS LAST FILLED BY HER PREVIOUS PCP    Does the patient have less than a 3 day supply:  [] Yes  [x] No    Would you like a call back once the refill request has been completed: [] Yes [x] No    If the office needs to give you a call back, can they leave a voicemail: [] Yes [x] No    Yuni Rios Rep   10/31/24 16:15 EDT

## 2024-11-02 DIAGNOSIS — J45.20 MILD INTERMITTENT ASTHMA WITHOUT COMPLICATION: ICD-10-CM

## 2024-11-02 DIAGNOSIS — J30.2 SEASONAL ALLERGIC RHINITIS, UNSPECIFIED TRIGGER: ICD-10-CM

## 2024-11-05 RX ORDER — MONTELUKAST SODIUM 10 MG/1
10 TABLET ORAL DAILY PRN
Qty: 90 TABLET | Refills: 1 | OUTPATIENT
Start: 2024-11-05

## 2024-12-06 DIAGNOSIS — M65.331 TRIGGER MIDDLE FINGER OF RIGHT HAND: Primary | ICD-10-CM

## 2024-12-16 ENCOUNTER — TRANSCRIBE ORDERS (OUTPATIENT)
Dept: INTERNAL MEDICINE | Facility: CLINIC | Age: 74
End: 2024-12-16
Payer: MEDICARE

## 2024-12-16 ENCOUNTER — OFFICE VISIT (OUTPATIENT)
Age: 74
End: 2024-12-16
Payer: MEDICARE

## 2024-12-16 VITALS
SYSTOLIC BLOOD PRESSURE: 108 MMHG | WEIGHT: 137 LBS | DIASTOLIC BLOOD PRESSURE: 62 MMHG | HEIGHT: 65 IN | BODY MASS INDEX: 22.82 KG/M2

## 2024-12-16 DIAGNOSIS — Z12.31 ENCOUNTER FOR SCREENING MAMMOGRAM FOR BREAST CANCER: Primary | ICD-10-CM

## 2024-12-16 DIAGNOSIS — M65.331 TRIGGER MIDDLE FINGER OF RIGHT HAND: Primary | ICD-10-CM

## 2024-12-16 PROCEDURE — 99213 OFFICE O/P EST LOW 20 MIN: CPT | Performed by: PLASTIC SURGERY

## 2024-12-16 PROCEDURE — 1159F MED LIST DOCD IN RCRD: CPT | Performed by: PLASTIC SURGERY

## 2024-12-16 PROCEDURE — 1160F RVW MEDS BY RX/DR IN RCRD: CPT | Performed by: PLASTIC SURGERY

## 2024-12-16 NOTE — PROGRESS NOTES
[facility]     Office Visit       Date: 12/16/2024   Patient Name: Tatyana Willett  MRN: [id number]  YOB: 1950    Referring Physician: Lit Gallagher MD     Chief Complaint:   Chief Complaint   Patient presents with    Right Hand - Pain       History of Present Illness:   Tatyana Willett is a 74 y.o. female presents with right middle finger trigger finger has been present for several years duration.  She has had 2 previous corticosteroid injections with recurrence of symptoms.  She also notes right middle finger flexion contracture of approximately 20 degrees.  She is otherwise healthy.  She denies smoking.  She is retired.      Subjective   Review of Systems:   Review of Systems   Constitutional:  Negative for chills, fever, unexpected weight gain and unexpected weight loss.   HENT:  Negative for congestion, postnasal drip and rhinorrhea.    Eyes:  Negative for blurred vision.   Respiratory:  Negative for shortness of breath.    Cardiovascular:  Negative for leg swelling.   Gastrointestinal:  Negative for abdominal pain, nausea and vomiting.   Genitourinary:  Negative for difficulty urinating.   Musculoskeletal:  Positive for arthralgias. Negative for gait problem, joint swelling and myalgias.   Skin:  Negative for skin lesions and wound.   Neurological:  Negative for dizziness, weakness, light-headedness and numbness.   Hematological:  Does not bruise/bleed easily.   Psychiatric/Behavioral:  Negative for depressed mood.         Pertinent review of systems per HPI.     I reviewed the patient's chief complaint, history of present illness, review of systems, past medical history, surgical history, family history, social history, medications and allergy list in the EMR on 12/16/2024 and agree with the findings above.    Objective    Vital Signs:   Vitals:    12/16/24 0917   BP: 108/62   Weight: 62.1 kg (137 lb)   Height:  "165.1 cm (65\")     BMI: Body mass index is 22.8 kg/m².    General Appearance: No acute distress. Alert and oriented.     Chest:  Non-labored breathing on room air. Regular rate and rhythm.    Upper Extremity Exam:    Tender palpation of the right middle finger A1 pulley with a palpable nodule.  No catching or locking flexion.  There is a 20 degree flexion contracture of the middle finger PIP that is passively correctable but painful.    Fingers are warm, well-perfused with appropriate capillary refill.  Palpable radial pulse.    Sensation intact to light touch in median, radial and ulnar nerve distributions.    Motor- Fires FPL, ulnar intrinsics, EPL/EDC w/ full active and passive range of motion. Strength intact.    Non-tender except for in the areas highlighted    Imaging/Studies:   Imaging Results (Last 24 Hours)       ** No results found for the last 24 hours. **                Procedures:  Procedures    Quality Measures:   ACP:   ACP discussion was deferred.    Tobacco:   Tatyana Willett  reports that she has never smoked. She has never been exposed to tobacco smoke. She has never used smokeless tobacco.      Assessment / Plan    Assessment/Plan:     There are no diagnoses linked to this encounter.     Tatyana Willettis a 74 y.o. female who presents with:      ICD-10-CM ICD-9-CM   1. Trigger middle finger of right hand  M65.331 727.03         Presents with right middle finger trigger finger.  She has failed to previous corticosteroid injection.  Discussed further treatment options including observation versus trigger finger release.  Discussed that she does have flexion contracture of the PIP to require significant postoperative hand therapy to correct.  The patient understands this and would like to proceed with surgery under local anesthesia    Consent-right middle finger trigger finger release    The risks and benefits of the procedure were discussed with the patient and or appropriate guardian, which " include but are not limited to the risk of bleeding, infection, neurovascular damage, post-operative stiffness, recurrence, tendon and/or ligament retears, recurrent instability, continued pain, arthritic pain, need for further revision surgeries in the future, deep venous thrombosis, and general risks from anesthesia. We also discussed the post-operative rehabilitation, the need for physical therapy, and the overall expected outcomes from the procedure. We also discussed the possible use of biologics including allograft. We allowed proper time and answered the patient's questions regarding the procedure. The patient expressed understanding. Knowing what the risks are and what the conservative treatment is, the patient elected to forgo any further conservative treatment options and proceed with the surgical intervention.      Follow Up:   Return for Follow Up after Post Op.        Harsh Cervantes MD  [facility] [place] [facility]

## 2025-01-13 ENCOUNTER — TELEPHONE (OUTPATIENT)
Dept: ORTHOPEDIC SURGERY | Facility: CLINIC | Age: 75
End: 2025-01-13
Payer: MEDICARE

## 2025-01-13 NOTE — TELEPHONE ENCOUNTER
I called patient but did not get an answer. I left her a voicemail letting her know that I was going to put new paper for her in the mail today. If she has any other questions she can give me a call back at [phone]. Meena Hinds CMA

## 2025-01-13 NOTE — TELEPHONE ENCOUNTER
Provider: ASHLEY    Caller: Tatyana Willett    Relationship to Patient: Self    Phone Number: [phone]    Reason for Call: PT HAS MISPLACED SX INSTRUCTIONS. PT HAS SX 1/17/25 AND ASKING FOR THOSE TO BE EMAILED TO [email or web address] IF POSSIBLE. PLEASE ADVISE.

## 2025-01-17 ENCOUNTER — OUTSIDE FACILITY SERVICE (OUTPATIENT)
Dept: ORTHOPEDIC SURGERY | Facility: CLINIC | Age: 75
End: 2025-01-17
Payer: MEDICARE

## 2025-01-17 ENCOUNTER — DOCUMENTATION (OUTPATIENT)
Age: 75
End: 2025-01-17
Payer: MEDICARE

## 2025-01-17 DIAGNOSIS — Z98.890 POST-OPERATIVE STATE: Primary | ICD-10-CM

## 2025-01-17 PROCEDURE — 26055 INCISE FINGER TENDON SHEATH: CPT | Performed by: PLASTIC SURGERY

## 2025-01-17 RX ORDER — OXYCODONE HYDROCHLORIDE 5 MG/1
5 TABLET ORAL EVERY 6 HOURS PRN
Qty: 12 TABLET | Refills: 0 | Status: SHIPPED | OUTPATIENT
Start: 2025-01-17

## 2025-01-17 RX ORDER — ACETAMINOPHEN 325 MG/1
650 TABLET ORAL EVERY 6 HOURS PRN
Qty: 100 TABLET | Refills: 1 | Status: SHIPPED | OUTPATIENT
Start: 2025-01-17

## 2025-01-17 NOTE — PROGRESS NOTES
DATE OF PROCEDURE: 01/17/2025    LOCATION: [facility]     PROCEDURES PERFORMED:    1. Right middle finger trigger finger release CPT 81543  2. Right ring finger trigger finger release CPT 78408    SURGEON: Harsh Cervantes MD      ASSISTANTS:    1. None        * Surgery not found *      ANESTHESIA: Local    PREOPERATIVE DIAGNOSES:  1. Right middle finger trigger finger     POSTOPERATIVE DIAGNOSES:    Same     ESTIMATED BLOOD LOSS: 1 mL.    SPECIMENS: None    IMPLANTS: None    COMPLICATIONS: None     INDICATIONS:  Tatyana Willett is a 74 y.o. female  who initially presented with pain and catching at the a1 pulley of the right middle finger.   Previously the patient has failed prior conservative measures and would like to proceed with surgical release.  The risks, benefits, alternatives and potential complications of surgery were discussed with the patient including but not limited to bleeding scarring, infection, recurrence, stiffness, damage to surrounding structures or postoperative pain.  The patient understands the risks and agreed to proceed with surgery.  A surgical informed consent was signed prior to the procedure.     After surgical release of her right middle finger she was asked to flex her hand and found to have a right ring finger trigger finger as well.  Discussed treatment options with the patient including observation versus surgical excision the patient elected to proceed with right ring finger trigger finger intraoperatively.  An updated consent was signed.     DESCRIPTION OF PROCEDURE:  The patient was greeted in the pre-operative holding area and the surgical site was marked and consent confirmed prior to bringing the patient to the operating room.  In the pre-operative holding area a combination of 1% lidocaine with epinephrine and 0.5% Marcaine was injected at the site of the incision for local anesthesia. The patient was then taken to the operating room and a timeout  was performed including the patient's name, procedure and antibiotic administration prior to the patient receiving local anesthesia.  The patient was positioned supine on the operative room table and a non-sterile tourniquet was applied to the right upper extremity. The upper extremity it was then prepped and draped in the usual sterile fashion.    An oblique incision was made directly over the A1 pulley of of the right middle finger in line with the pre-existing skin crease.  Blunt dissection was used to extend the dissection down to the level of the A1 berry.  Ragnell retractors were used to protect the adjacent digital bundles and then the A1 pulley was incised under direct visualization with a 15 blade.  The A1 pulley release was then completed proximally and distally with tenotomy scissors.  The patient was then asked to flex and extend her finger to confirm that the pulley had been completely released and that the patient was no longer catching.  Her right ring finger was then found to be triggering after she was asked to flex and extend the fingers of her hand.  This had previously been mass by right middle finger trigger finger.  Discussed this with the patient as well as treatment options going observation versus ring finger trigger release and she elected to proceed with a right ring finger trigger release.  Updated consent was signed by the patient intraoperatively.    Oblique incision was then made directly over the A1 pulley at the right ring finger line to the pre-existing skin crease.  Blunt dissection was used to extend the dissection down to the level of the A1 berry.  Ragnell retractors were used to protect the adjacent digital bundles and the A1 pulley was incised under direct visualization with a 15 blade.  There is complete approximately distally with scissors.  She was then asked again to flex and extend fingers of her right hand.  Her digits are no longer catching or locking.  Tourniquet was  let down and hemostasis achieved with bipolar electrocautery.  Wound was irrigated with copious amounts normal saline solution.        Skin was closed with 4-0 nylon in horizontal mattress fashion.  A soft dressing was applied.    At the end of the procedure the patient was  transferred to the PACU in stable condition.  I participated in all parts of the case.      POSTOPERATIVE PLAN:  No lifting greater than 5 pounds with the operative extremity.  2.  Over the counter Tylenol and/or Advil/Aleve/Motrin for pain control.  3.  Dressings may be removed in 3 days and replaced with a dry daily dressing. Steri strips are to remain in place.   4.  Follow up in 10-14 days as scheduled.

## 2025-01-20 NOTE — PATIENT INSTRUCTIONS
Health Maintenance, Female  Adopting a healthy lifestyle and getting preventive care can go a long way to promote health and wellness. Talk with your health care provider about what schedule of regular examinations is right for you. This is a good chance for you to check in with your provider about disease prevention and staying healthy.  In between checkups, there are plenty of things you can do on your own. Experts have done a lot of research about which lifestyle changes and preventive measures are most likely to keep you healthy. Ask your health care provider for more information.  Weight and diet  Eat a healthy diet  Be sure to include plenty of vegetables, fruits, low-fat dairy products, and lean protein.  Do not eat a lot of foods high in solid fats, added sugars, or salt.  Get regular exercise. This is one of the most important things you can do for your health.  Most adults should exercise for at least 150 minutes each week. The exercise should increase your heart rate and make you sweat (moderate-intensity exercise).  Most adults should also do strengthening exercises at least twice a week. This is in addition to the moderate-intensity exercise.     Maintain a healthy weight  Body mass index (BMI) is a measurement that can be used to identify possible weight problems. It estimates body fat based on height and weight. Your health care provider can help determine your BMI and help you achieve or maintain a healthy weight.  For females 20 years of age and older:  A BMI below 18.5 is considered underweight.  A BMI of 18.5 to 24.9 is normal.  A BMI of 25 to 29.9 is considered overweight.  A BMI of 30 and above is considered obese.     Watch levels of cholesterol and blood lipids  You should start having your blood tested for lipids and cholesterol at 20 years of age, then have this test every 5 years.  You may need to have your cholesterol levels checked more often if:  Your lipid or cholesterol levels are  high.  You are older than 50 years of age.  You are at high risk for heart disease.     Cancer screening  Lung Cancer  Lung cancer screening is recommended for adults 55-80 years old who are at high risk for lung cancer because of a history of smoking.  A yearly low-dose CT scan of the lungs is recommended for people who:  Currently smoke.  Have quit within the past 15 years.  Have at least a 30-pack-year history of smoking. A pack year is smoking an average of one pack of cigarettes a day for 1 year.  Yearly screening should continue until it has been 15 years since you quit.  Yearly screening should stop if you develop a health problem that would prevent you from having lung cancer treatment.     Breast Cancer  Practice breast self-awareness. This means understanding how your breasts normally appear and feel.  It also means doing regular breast self-exams. Let your health care provider know about any changes, no matter how small.  If you are in your 20s or 30s, you should have a clinical breast exam (CBE) by a health care provider every 1-3 years as part of a regular health exam.  If you are 40 or older, have a CBE every year. Also consider having a breast X-ray (mammogram) every year.  If you have a family history of breast cancer, talk to your health care provider about genetic screening.  If you are at high risk for breast cancer, talk to your health care provider about having an MRI and a mammogram every year.  Breast cancer gene (BRCA) assessment is recommended for women who have family members with BRCA-related cancers. BRCA-related cancers include:  Breast.  Ovarian.  Tubal.  Peritoneal cancers.  Results of the assessment will determine the need for genetic counseling and BRCA1 and BRCA2 testing.     Cervical Cancer  Your health care provider may recommend that you be screened regularly for cancer of the pelvic organs (ovaries, uterus, and vagina). This screening involves a pelvic examination, including  checking for microscopic changes to the surface of your cervix (Pap test). You may be encouraged to have this screening done every 3 years, beginning at age 21.  For women ages 30-65, health care providers may recommend pelvic exams and Pap testing every 3 years, or they may recommend the Pap and pelvic exam, combined with testing for human papilloma virus (HPV), every 5 years. Some types of HPV increase your risk of cervical cancer. Testing for HPV may also be done on women of any age with unclear Pap test results.  Other health care providers may not recommend any screening for nonpregnant women who are considered low risk for pelvic cancer and who do not have symptoms. Ask your health care provider if a screening pelvic exam is right for you.  If you have had past treatment for cervical cancer or a condition that could lead to cancer, you need Pap tests and screening for cancer for at least 20 years after your treatment. If Pap tests have been discontinued, your risk factors (such as having a new sexual partner) need to be reassessed to determine if screening should resume. Some women have medical problems that increase the chance of getting cervical cancer. In these cases, your health care provider may recommend more frequent screening and Pap tests.     Colorectal Cancer  This type of cancer can be detected and often prevented.  Routine colorectal cancer screening usually begins at 50 years of age and continues through 75 years of age.  Your health care provider may recommend screening at an earlier age if you have risk factors for colon cancer.  Your health care provider may also recommend using home test kits to check for hidden blood in the stool.  A small camera at the end of a tube can be used to examine your colon directly (sigmoidoscopy or colonoscopy). This is done to check for the earliest forms of colorectal cancer.  Routine screening usually begins at age 50.  Direct examination of the colon should  be repeated every 5-10 years through 75 years of age. However, you may need to be screened more often if early forms of precancerous polyps or small growths are found.     Skin Cancer  Check your skin from head to toe regularly.  Tell your health care provider about any new moles or changes in moles, especially if there is a change in a mole's shape or color.  Also tell your health care provider if you have a mole that is larger than the size of a pencil eraser.  Always use sunscreen. Apply sunscreen liberally and repeatedly throughout the day.  Protect yourself by wearing long sleeves, pants, a wide-brimmed hat, and sunglasses whenever you are outside.     Heart disease, diabetes, and high blood pressure  High blood pressure causes heart disease and increases the risk of stroke. High blood pressure is more likely to develop in:  People who have blood pressure in the high end of the normal range (130-139/85-89 mm Hg).  People who are overweight or obese.  People who are .  If you are 18-39 years of age, have your blood pressure checked every 3-5 years. If you are 40 years of age or older, have your blood pressure checked every year. You should have your blood pressure measured twice--once when you are at a hospital or clinic, and once when you are not at a hospital or clinic. Record the average of the two measurements. To check your blood pressure when you are not at a hospital or clinic, you can use:  An automated blood pressure machine at a pharmacy.  A home blood pressure monitor.  If you are between 55 years and 79 years old, ask your health care provider if you should take aspirin to prevent strokes.  Have regular diabetes screenings. This involves taking a blood sample to check your fasting blood sugar level.  If you are at a normal weight and have a low risk for diabetes, have this test once every three years after 45 years of age.  If you are overweight and have a high risk for diabetes,  consider being tested at a younger age or more often.  Preventing infection  Hepatitis B  If you have a higher risk for hepatitis B, you should be screened for this virus. You are considered at high risk for hepatitis B if:  You were born in a country where hepatitis B is common. Ask your health care provider which countries are considered high risk.  Your parents were born in a high-risk country, and you have not been immunized against hepatitis B (hepatitis B vaccine).  You have HIV or AIDS.  You use needles to inject street drugs.  You live with someone who has hepatitis B.  You have had sex with someone who has hepatitis B.  You get hemodialysis treatment.  You take certain medicines for conditions, including cancer, organ transplantation, and autoimmune conditions.     Hepatitis C  Blood testing is recommended for:  Everyone born from 1945 through 1965.  Anyone with known risk factors for hepatitis C.     Sexually transmitted infections (STIs)  You should be screened for sexually transmitted infections (STIs) including gonorrhea and chlamydia if:  You are sexually active and are younger than 24 years of age.  You are older than 24 years of age and your health care provider tells you that you are at risk for this type of infection.  Your sexual activity has changed since you were last screened and you are at an increased risk for chlamydia or gonorrhea. Ask your health care provider if you are at risk.  If you do not have HIV, but are at risk, it may be recommended that you take a prescription medicine daily to prevent HIV infection. This is called pre-exposure prophylaxis (PrEP). You are considered at risk if:  You are sexually active and do not regularly use condoms or know the HIV status of your partner(s).  You take drugs by injection.  You are sexually active with a partner who has HIV.     Talk with your health care provider about whether you are at high risk of being infected with HIV. If you choose to  begin PrEP, you should first be tested for HIV. You should then be tested every 3 months for as long as you are taking PrEP.  Pregnancy  If you are premenopausal and you may become pregnant, ask your health care provider about preconception counseling.  If you may become pregnant, take 400 to 800 micrograms (mcg) of folic acid every day.  If you want to prevent pregnancy, talk to your health care provider about birth control (contraception).  Osteoporosis and menopause  Osteoporosis is a disease in which the bones lose minerals and strength with aging. This can result in serious bone fractures. Your risk for osteoporosis can be identified using a bone density scan.  If you are 65 years of age or older, or if you are at risk for osteoporosis and fractures, ask your health care provider if you should be screened.  Ask your health care provider whether you should take a calcium or vitamin D supplement to lower your risk for osteoporosis.  Menopause may have certain physical symptoms and risks.  Hormone replacement therapy may reduce some of these symptoms and risks.  Talk to your health care provider about whether hormone replacement therapy is right for you.  Follow these instructions at home:  Schedule regular health, dental, and eye exams.  Stay current with your immunizations.  Do not use any tobacco products including cigarettes, chewing tobacco, or electronic cigarettes.  If you are pregnant, do not drink alcohol.  If you are breastfeeding, limit how much and how often you drink alcohol.  Limit alcohol intake to no more than 1 drink per day for nonpregnant women. One drink equals 12 ounces of beer, 5 ounces of wine, or 1½ ounces of hard liquor.  Do not use street drugs.  Do not share needles.  Ask your health care provider for help if you need support or information about quitting drugs.  Tell your health care provider if you often feel depressed.  Tell your health care provider if you have ever been abused or do  not feel safe at home.  This information is not intended to replace advice given to you by your health care provider. Make sure you discuss any questions you have with your health care provider.  Document Released: 07/02/2012 Document Revised: 05/25/2017 Document Reviewed: 09/20/2016  [employer] Patient Education © 2018 [employer] Fall Prevention in the Home, Adult  Falls can cause injuries and can happen to people of all ages. There are many things you can do to make your home safe and to help prevent falls. Ask for help when making these changes.  What actions can I take to prevent falls?  General Instructions  Use good lighting in all rooms. Replace any light bulbs that burn out.  Turn on the lights in dark areas. Use night-lights.  Keep items that you use often in easy-to-reach places. Lower the shelves around your home if needed.  Set up your furniture so you have a clear path. Avoid moving your furniture around.  Do not have throw rugs or other things on the floor that can make you trip.  Avoid walking on wet floors.  If any of your floors are uneven, fix them.  Add color or contrast paint or tape to clearly rosa and help you see:  Grab bars or handrails.  First and last steps of staircases.  Where the edge of each step is.  If you use a stepladder:  Make sure that it is fully opened. Do not climb a closed stepladder.  Make sure the sides of the stepladder are locked in place.  Ask someone to hold the stepladder while you use it.  Know where your pets are when moving through your home.  What can I do in the bathroom?         Keep the floor dry. Clean up any water on the floor right away.  Remove soap buildup in the tub or shower.  Use nonskid mats or decals on the floor of the tub or shower.  Attach bath mats securely with double-sided, nonslip rug tape.  If you need to sit down in the shower, use a plastic, nonslip stool.  Install grab bars by the toilet and in the tub and shower. Do not use  towel bars as grab bars.  What can I do in the bedroom?  Make sure that you have a light by your bed that is easy to reach.  Do not use any sheets or blankets for your bed that hang to the floor.  Have a firm chair with side arms that you can use for support when you get dressed.  What can I do in the kitchen?  Clean up any spills right away.  If you need to reach something above you, use a step stool with a grab bar.  Keep electrical cords out of the way.  Do not use floor polish or wax that makes floors slippery.  What can I do with my stairs?  Do not leave any items on the stairs.  Make sure that you have a light switch at the top and the bottom of the stairs.  Make sure that there are handrails on both sides of the stairs. Fix handrails that are broken or loose.  Install nonslip stair treads on all your stairs.  Avoid having throw rugs at the top or bottom of the stairs.  Choose a carpet that does not hide the edge of the steps on the stairs.  Check carpeting to make sure that it is firmly attached to the stairs. Fix carpet that is loose or worn.  What can I do on the outside of my home?  Use bright outdoor lighting.  Fix the edges of walkways and driveways and fix any cracks.  Remove anything that might make you trip as you walk through a door, such as a raised step or threshold.  Trim any bushes or trees on paths to your home.  Check to see if handrails are loose or broken and that both sides of all steps have handrails.  Install guardrails along the edges of any raised decks and porches.  Clear paths of anything that can make you trip, such as tools or rocks.  Have leaves, snow, or ice cleared regularly.  Use sand or salt on paths during winter.  Clean up any spills in your garage right away. This includes grease or oil spills.  What other actions can I take?  Wear shoes that:  Have a low heel. Do not wear high heels.  Have rubber bottoms.  Feel good on your feet and fit well.  Are closed at the toe. Do not  wear open-toe sandals.  Use tools that help you move around if needed. These include:  Canes.  Walkers.  Scooters.  Crutches.  Review your medicines with your doctor. Some medicines can make you feel dizzy. This can increase your chance of falling.  Ask your doctor what else you can do to help prevent falls.  Where to find more information  Centers for Disease Control and Prevention, [employer]: www.cdc.gov  [employer] [place] on Aging: www.basim.nih.gov  Contact a doctor if:  You are afraid of falling at home.  You feel weak, drowsy, or dizzy at home.  You fall at home.  Summary  There are many simple things that you can do to make your home safe and to help prevent falls.  Ways to make your home safe include removing things that can make you trip and installing grab bars in the bathroom.  Ask for help when making these changes in your home.  This information is not intended to replace advice given to you by your health care provider. Make sure you discuss any questions you have with your health care provider.  Document Revised: 07/21/2021 Document Reviewed: 07/21/2021  [employer] Patient Education © 2021 [employer]    Healthy Eating  Following a healthy eating pattern may help you to achieve and maintain a healthy body weight, reduce the risk of chronic disease, and live a long and productive life. It is important to follow a healthy eating pattern at an appropriate calorie level for your body. Your nutritional needs should be met primarily through food by choosing a variety of nutrient-rich foods.  What are tips for following this plan?  Reading food labels  Read labels and choose the following:  Reduced or low sodium.  Juices with 100% fruit juice.  Foods with low saturated fats and high polyunsaturated and monounsaturated fats.  Foods with whole grains, such as whole wheat, cracked wheat, brown rice, and wild rice.  Whole grains that are fortified with folic acid. This is recommended for women who are pregnant or  "who want to become pregnant.  Read labels and avoid the following:  Foods with a lot of added sugars. These include foods that contain brown sugar, corn sweetener, corn syrup, dextrose, fructose, glucose, high-fructose corn syrup, honey, invert sugar, lactose, malt syrup, maltose, molasses, raw sugar, sucrose, trehalose, or turbinado sugar.  Do not eat more than the following amounts of added sugar per day:  6 teaspoons (25 g) for women.  9 teaspoons (38 g) for men.  Foods that contain processed or refined starches and grains.  Refined grain products, such as white flour, degermed cornmeal, white bread, and white rice.  Shopping  Choose nutrient-rich snacks, such as vegetables, whole fruits, and nuts. Avoid high-calorie and high-sugar snacks, such as potato chips, fruit snacks, and candy.  Use oil-based dressings and spreads on foods instead of solid fats such as butter, stick margarine, or cream cheese.  Limit pre-made sauces, mixes, and \"instant\" products such as flavored rice, instant noodles, and ready-made pasta.  Try more plant-protein sources, such as tofu, tempeh, black beans, edamame, lentils, nuts, and seeds.  Explore eating plans such as the Mediterranean diet or vegetarian diet.  Cooking  Use oil to sauté or stir-cabrales foods instead of solid fats such as butter, stick margarine, or lard.  Try baking, boiling, grilling, or broiling instead of frying.  Remove the fatty part of meats before cooking.  Steam vegetables in water or broth.  Meal planning    At meals, imagine dividing your plate into fourths:  One-half of your plate is fruits and vegetables.  One-fourth of your plate is whole grains.  One-fourth of your plate is protein, especially lean meats, poultry, eggs, tofu, beans, or nuts.  Include low-fat dairy as part of your daily diet.     Lifestyle  Choose healthy options in all settings, including home, work, school, restaurants, or stores.  Prepare your food safely:  Wash your hands after handling " raw meats.  Keep food preparation surfaces clean by regularly washing with hot, soapy water.  Keep raw meats separate from ready-to-eat foods, such as fruits and vegetables.  , meat, poultry, and eggs to the recommended internal temperature.  Store foods at safe temperatures. In general:  Keep cold foods at 40°F (4.4°C) or below.  Keep hot foods at 140°F (60°C) or above.  Keep your freezer at 0°F (-17.8°C) or below.  Foods are no longer safe to eat when they have been between the temperatures of 40°-140°F (4.4-60°C) for more than 2 hours.  What foods should I eat?  Fruits  Aim to eat 2 cup-equivalents of fresh, canned (in natural juice), or frozen fruits each day. Examples of 1 cup-equivalent of fruit include 1 small apple, 8 large strawberries, 1 cup canned fruit, ½ cup dried fruit, or 1 cup 100% juice.  Vegetables  Aim to eat 2½-3 cup-equivalents of fresh and frozen vegetables each day, including different varieties and colors. Examples of 1 cup-equivalent of vegetables include 2 medium carrots, 2 cups raw, leafy greens, 1 cup chopped vegetable (raw or cooked), or 1 medium baked potato.  Grains  Aim to eat 6 ounce-equivalents of whole grains each day. Examples of 1 ounce-equivalent of grains include 1 slice of bread, 1 cup ready-to-eat cereal, 3 cups popcorn, or ½ cup cooked rice, pasta, or cereal.  Meats and other proteins  Aim to eat 5-6 ounce-equivalents of protein each day. Examples of 1 ounce-equivalent of protein include 1 egg, 1/2 cup nuts or seeds, or 1 tablespoon (16 g) peanut butter. A cut of meat or fish that is the size of a deck of cards is about 3-4 ounce-equivalents.  Of the protein you eat each week, try to have at least 8 ounces come from seafood. This includes salmon, trout, herring, and anchovies.  Dairy  Aim to eat 3 cup-equivalents of fat-free or low-fat dairy each day. Examples of 1 cup-equivalent of dairy include 1 cup (240 mL) milk, 8 ounces (250 g) yogurt, 1½ ounces (44 g)  natural cheese, or 1 cup (240 mL) fortified soy milk.  Fats and oils  Aim for about 5 teaspoons (21 g) per day. Choose monounsaturated fats, such as canola and olive oils, avocados, peanut butter, and most nuts, or polyunsaturated fats, such as sunflower, corn, and soybean oils, walnuts, pine nuts, sesame seeds, sunflower seeds, and flaxseed.  Beverages  Aim for six 8-oz glasses of water per day. Limit coffee to three to five 8-oz cups per day.  Limit caffeinated beverages that have added calories, such as soda and energy drinks.  Limit alcohol intake to no more than 1 drink a day for nonpregnant women and 2 drinks a day for men. One drink equals 12 oz of beer (355 mL), 5 oz of wine (148 mL), or 1½ oz of hard liquor (44 mL).  Seasoning and other foods  Avoid adding excess amounts of salt to your foods. Try flavoring foods with herbs and spices instead of salt.  Avoid adding sugar to foods.  Try using oil-based dressings, sauces, and spreads instead of solid fats.  This information is based on general [place] nutrition guidelines. For more information, visit choosemyplate.gov. Exact amounts may vary based on your nutrition needs.  Summary  A healthy eating plan may help you to maintain a healthy weight, reduce the risk of chronic diseases, and stay active throughout your life.  Plan your meals. Make sure you eat the right portions of a variety of nutrient-rich foods.  Try baking, boiling, grilling, or broiling instead of frying.  Choose healthy options in all settings, including home, work, school, restaurants, or stores.  This information is not intended to replace advice given to you by your health care provider. Make sure you discuss any questions you have with your health care provider.  Document Revised: 04/01/2019 Document Reviewed: 04/01/2019  [employer] Patient Education © 2021 [employer]    Exercising to Stay Healthy  To become healthy and stay healthy, it is recommended that you do moderate-intensity and  vigorous-intensity exercise. You can tell that you are exercising at a moderate intensity if your heart starts beating faster and you start breathing faster but can still hold a conversation. You can tell that you are exercising at a vigorous intensity if you are breathing much harder and faster and cannot hold a conversation while exercising.  Exercising regularly is important. It has many health benefits, such as:  Improving overall fitness, flexibility, and endurance.  Increasing bone density.  Helping with weight control.  Decreasing body fat.  Increasing muscle strength.  Reducing stress and tension.  Improving overall health.  How often should I exercise?  Choose an activity that you enjoy, and set realistic goals. Your health care provider can help you make an activity plan that works for you.  Exercise regularly as told by your health care provider. This may include:  Doing strength training two times a week, such as:  Lifting weights.  Using resistance bands.  Push-ups.  Sit-ups.  Yoga.  Doing a certain intensity of exercise for a given amount of time. Choose from these options:  A total of 150 minutes of moderate-intensity exercise every week.  A total of 75 minutes of vigorous-intensity exercise every week.  A mix of moderate-intensity and vigorous-intensity exercise every week.  Children, pregnant women, people who have not exercised regularly, people who are overweight, and older adults may need to talk with a health care provider about what activities are safe to do. If you have a medical condition, be sure to talk with your health care provider before you start a new exercise program.  What are some exercise ideas?    Moderate-intensity exercise ideas include:  Walking 1 mile (1.6 km) in about 15 minutes.  Biking.  Hiking.  Golfing.  Dancing.  Water aerobics.  Vigorous-intensity exercise ideas include:  Walking 4.5 miles (7.2 km) or more in about 1 hour.  Jogging or running 5 miles (8 km) in about 1  hour.  Biking 10 miles (16.1 km) or more in about 1 hour.  Lap swimming.  Roller-skating or in-line skating.  Cross-country skiing.  Vigorous competitive sports, such as football, basketball, and soccer.  Jumping rope.  Aerobic dancing.  What are some everyday activities that can help me to get exercise?  Yard work, such as:  Pushing a .  Raking and bagging leaves.  Washing your car.  Pushing a stroller.  Shoveling snow.  Gardening.  Washing windows or floors.  How can I be more active in my day-to-day activities?  Use stairs instead of an elevator.  Take a walk during your lunch break.  If you drive, park your car farther away from your work or school.  If you take public transportation, get off one stop early and walk the rest of the way.  Stand up or walk around during all of your indoor phone calls.  Get up, stretch, and walk around every 30 minutes throughout the day.  Enjoy exercise with a friend. Support to continue exercising will help you keep a regular routine of activity.  What guidelines can I follow while exercising?  Before you start a new exercise program, talk with your health care provider.  Do not exercise so much that you hurt yourself, feel dizzy, or get very short of breath.  Wear comfortable clothes and wear shoes with good support.  Drink plenty of water while you exercise to prevent dehydration or heat stroke.  Work out until your breathing and your heartbeat get faster.  Where to find more information  [place] Department of Health and Human Services: www.hhs.gov  Centers for Disease Control and Prevention (CDC): www.cdc.gov  Summary  Exercising regularly is important. It will improve your overall fitness, flexibility, and endurance.  Regular exercise also will improve your overall health. It can help you control your weight, reduce stress, and improve your bone density.  Do not exercise so much that you hurt yourself, feel dizzy, or get very short of breath.  Before you start a new  exercise program, talk with your health care provider.  This information is not intended to replace advice given to you by your health care provider. Make sure you discuss any questions you have with your health care provider.  Document Revised: 11/30/2018 Document Reviewed: 11/08/2018  Elsevier Patient Education © 2021 [employer]       Advance Care Planning and Advance Directives      You make decisions on a daily basis - decisions about where you want to live, your career, your home, your life. Perhaps one of the most important decisions you face is your choice for future medical care. Take time to talk with your family and your healthcare team and start planning today.  Advance Care Planning is a process that can help you:  Understand possible future healthcare decisions in light of your own experiences  Reflect on those decision in light of your goals and values  Discuss your decisions with those closest to you and the healthcare professionals that care for you  Make a plan by creating a document that reflects your wishes     Surrogate Decision Maker  In the event of a medical emergency, which has left you unable to communicate or to make your own decisions, you would need someone to make decisions for you.  It is important to discuss your preferences for medical treatment with this person while you are in good health.      Qualities of a surrogate decision maker:  Willing to take on this role and responsibility  Knows what you want for future medical care  Willing to follow your wishes even if they don't agree with them  Able to make difficult medical decisions under stressful circumstances     Advance Directives  These are legal documents you can create that will guide your healthcare team and decision maker(s) when needed. These documents can be stored in the electronic medical record.     Living Will - a legal document to guide your care if you have a terminal condition or a serious illness and are unable  to communicate. States vary by statute in document names/types, but most forms may include one or more of the following:              -  Directions regarding life-prolonging treatments              -  Directions regarding artificially provided nutrition/hydration              -  Choosing a healthcare decision maker              -  Direction regarding organ/tissue donation     Durable Power of  for Healthcare - this document names an -in-fact to make medical decisions for you, but it may also allow this person to make personal and financial decisions for you. Please seek the advice of an  if you need this type of document.     **Advance Directives are not required and no one may discriminate against you if you do not sign one.     Medical Orders  Many states allow specific forms/orders signed by your physician to record your wishes for medical treatment in your current state of health. This form, signed in personal communication with your physician, addresses resuscitation and other medical interventions that you may or may not want.        For more information or to schedule a time with a [facility] Advance Care Planning Facilitator contact: [email or web address] or call [phone] and someone will contact you directly.

## 2025-01-21 ENCOUNTER — OFFICE VISIT (OUTPATIENT)
Dept: INTERNAL MEDICINE | Facility: CLINIC | Age: 75
End: 2025-01-21
Payer: MEDICARE

## 2025-01-21 VITALS
HEART RATE: 57 BPM | RESPIRATION RATE: 20 BRPM | BODY MASS INDEX: 22.99 KG/M2 | SYSTOLIC BLOOD PRESSURE: 110 MMHG | HEIGHT: 65 IN | OXYGEN SATURATION: 98 % | TEMPERATURE: 97.3 F | WEIGHT: 138 LBS | DIASTOLIC BLOOD PRESSURE: 70 MMHG

## 2025-01-21 DIAGNOSIS — R74.01 ELEVATED TRANSAMINASE LEVEL: ICD-10-CM

## 2025-01-21 DIAGNOSIS — G45.9 TIA (TRANSIENT ISCHEMIC ATTACK): ICD-10-CM

## 2025-01-21 DIAGNOSIS — Z00.00 ENCOUNTER FOR MEDICARE ANNUAL WELLNESS EXAM: Primary | ICD-10-CM

## 2025-01-21 DIAGNOSIS — Z86.73 HISTORY OF TRANSIENT ISCHEMIC ATTACK (TIA): ICD-10-CM

## 2025-01-21 DIAGNOSIS — M65.331 TRIGGER MIDDLE FINGER OF RIGHT HAND: ICD-10-CM

## 2025-01-21 LAB
ALBUMIN SERPL-MCNC: 3.7 G/DL (ref 3.5–5.2)
ALBUMIN/GLOB SERPL: 1.2 G/DL
ALP SERPL-CCNC: 56 U/L (ref 39–117)
ALT SERPL W P-5'-P-CCNC: 22 U/L (ref 1–33)
ANION GAP SERPL CALCULATED.3IONS-SCNC: 7.5 MMOL/L (ref 5–15)
AST SERPL-CCNC: 27 U/L (ref 1–32)
BILIRUB SERPL-MCNC: 0.3 MG/DL (ref 0–1.2)
BUN SERPL-MCNC: 16 MG/DL (ref 8–23)
BUN/CREAT SERPL: 18 (ref 7–25)
CALCIUM SPEC-SCNC: 9.1 MG/DL (ref 8.6–10.5)
CHLORIDE SERPL-SCNC: 106 MMOL/L (ref 98–107)
CHOLEST SERPL-MCNC: 135 MG/DL (ref 0–200)
CO2 SERPL-SCNC: 26.5 MMOL/L (ref 22–29)
CREAT SERPL-MCNC: 0.89 MG/DL (ref 0.57–1)
EGFRCR SERPLBLD CKD-EPI 2021: 68.1 ML/MIN/1.73
GLOBULIN UR ELPH-MCNC: 3.2 GM/DL
GLUCOSE SERPL-MCNC: 88 MG/DL (ref 65–99)
HDLC SERPL-MCNC: 64 MG/DL (ref 40–60)
LDLC SERPL CALC-MCNC: 61 MG/DL (ref 0–100)
LDLC/HDLC SERPL: 0.98 {RATIO}
POTASSIUM SERPL-SCNC: 4.4 MMOL/L (ref 3.5–5.2)
PROT SERPL-MCNC: 6.9 G/DL (ref 6–8.5)
SODIUM SERPL-SCNC: 140 MMOL/L (ref 136–145)
TRIGL SERPL-MCNC: 40 MG/DL (ref 0–150)
VLDLC SERPL-MCNC: 10 MG/DL (ref 5–40)

## 2025-01-21 PROCEDURE — 80061 LIPID PANEL: CPT | Performed by: STUDENT IN AN ORGANIZED HEALTH CARE EDUCATION/TRAINING PROGRAM

## 2025-01-21 PROCEDURE — 80053 COMPREHEN METABOLIC PANEL: CPT | Performed by: STUDENT IN AN ORGANIZED HEALTH CARE EDUCATION/TRAINING PROGRAM

## 2025-01-21 NOTE — ASSESSMENT & PLAN NOTE
Chronic, improving. Continue ASA and statin. Now changed to crestor. Will repeat fasting lipid and cmp to monitor.

## 2025-01-21 NOTE — PROGRESS NOTES
Subjective   The ABCs of the Annual Wellness Visit  Medicare Wellness Visit      Tatyana Willett is a 74 y.o. patient with history of PFO, TIA, GERD, ANNETTA who presents for a Medicare Wellness Visit.    The following portions of the patient's history were reviewed and   updated as appropriate: allergies, current medications, past family history, past medical history, past social history, past surgical history, and problem list.        Compared to one year ago, the patient's physical   health is the same.  Compared to one year ago, the patient's mental   health is the same.    Recent Hospitalizations:  She was not admitted to the hospital during the last year.     Current Medical Providers:  Patient Care Team:  Lit Gallagher MD as PCP - General (Internal Medicine)  Geeta Dickinson MD as Consulting Physician (Dermatology)  Ba Crews MD as Cardiologist (Cardiology)  Romie De La Cruz MD as Consulting Physician (Ophthalmology)  Silvio Acosta APRN as Nurse Practitioner (Otolaryngology)  Gerald Whitmore MD as Consulting Physician (Pulmonary Disease)  Harsh Cervantes MD as Consulting Physician (Orthopedic Surgery)  Silvio Isaacs MD as Consulting Physician (Orthopedic Surgery)  Elizabeth Mueller MD as Consulting Physician (Obstetrics and Gynecology)    Outpatient Medications Prior to Visit   Medication Sig Dispense Refill    acetaminophen (Tylenol) 325 MG tablet Take 2 tablets by mouth Every 6 (Six) Hours As Needed for Moderate Pain. 100 tablet 1    albuterol sulfate  (90 Base) MCG/ACT inhaler Inhale 2 puffs Every 4 (Four) Hours As Needed for Wheezing or Shortness of Air. 18 g 1    aspirin 81 MG chewable tablet Chew 1 tablet Daily. 30 tablet 0    Calcium Carbonate-Vitamin D (CALTRATE 600+D PO) Take 1 tablet by mouth Daily.      fluticasone (FLONASE) 50 MCG/ACT nasal spray Administer 1 spray into the nostril(s) as directed by provider Every Morning.      Glucosamine-Chondroit-Vit  C-Mn (GLUCOSAMINE 1500 COMPLEX PO) Take 1 tablet by mouth daily.      loratadine (CLARITIN) 10 MG tablet Take 1 tablet by mouth Daily As Needed.      montelukast (SINGULAIR) 10 MG tablet Take 1 tablet by mouth Daily As Needed (as needed). 90 tablet 1    Multiple Vitamins-Minerals (WOMENS ONE DAILY) tablet Take 1 tablet by mouth Daily.      rosuvastatin (CRESTOR) 20 MG tablet Take 1 tablet by mouth Daily. 90 tablet 3    Scopolamine 1 MG/3DAYS patch Place 1 patch on the skin as directed by provider Every 72 (Seventy-Two) Hours. Apply at least 4 hours prior to event, do not use for more than 6 consecutive days. 4 each 0    vitamin C (ASCORBIC ACID) 500 MG tablet Take 1 tablet by mouth Daily.      Vitamin D, Cholecalciferol, 25 MCG (1000 UT) capsule Take 1 capsule by mouth Daily.      oxyCODONE (ROXICODONE) 5 MG immediate release tablet Take 1 tablet by mouth Every 6 (Six) Hours As Needed for Severe Pain. 12 tablet 0     No facility-administered medications prior to visit.     No opioid medication identified on active medication list. I have reviewed chart for other potential  high risk medication/s and harmful drug interactions in the elderly.      Aspirin is on active medication list. Aspirin use is indicated based on review of current medical condition/s. Pros and cons of this therapy have been discussed today. Benefits of this medication outweigh potential harm.  Patient has been encouraged to continue taking this medication.  .      Patient Active Problem List   Diagnosis    Atopic rhinitis    Asthma    Ingrown toenail    Osteoarthritis of lumbar spine    Primary localized osteoarthrosis of hand    Scoliosis    Dyssomnia    Preventative health care    Low back pain    Throat pain    GERD (gastroesophageal reflux disease)    Family history of heart disease    Eosinophilic esophagitis    Acute arytenoiditis    Parasomnia in conditions classified elsewhere    ANNETTA (obstructive sleep apnea)    Vertigo    Aortic valve  "calcification    PFO (patent foramen ovale)    History of transient ischemic attack (TIA)    TIA (transient ischemic attack)    Trigger middle finger of right hand     Advance Care Planning Advance Directive is on file.  ACP discussion was held with the patient during this visit. Patient has an advance directive in EMR which is still valid.             Objective   Vitals:    25 0844   BP: 110/70   BP Location: Left arm   Patient Position: Sitting   Cuff Size: Adult   Pulse: 57   Resp: 20   Temp: 97.3 °F (36.3 °C)   TempSrc: Temporal   SpO2: 98%   Weight: 62.6 kg (138 lb)   Height: 165.1 cm (65\")   PainSc:   2   PainLoc: Hand       Estimated body mass index is 22.96 kg/m² as calculated from the following:    Height as of this encounter: 165.1 cm (65\").    Weight as of this encounter: 62.6 kg (138 lb).    BMI is within normal parameters. No other follow-up for BMI required.           Does the patient have evidence of cognitive impairment? No                                                                                                Health  Risk Assessment    Smoking Status:  Social History     Tobacco Use   Smoking Status Never    Passive exposure: Never   Smokeless Tobacco Never     Alcohol Consumption:  Social History     Substance and Sexual Activity   Alcohol Use No       Fall Risk Screen  STEADI Fall Risk Assessment was completed, and patient is at LOW risk for falls.Assessment completed on:2025    Depression Screening   Little interest or pleasure in doing things? Not at all   Feeling down, depressed, or hopeless? Not at all   PHQ-2 Total Score 0      Health Habits and Functional and Cognitive Screenin/15/2025    11:05 AM   Functional & Cognitive Status   Do you have difficulty preparing food and eating? No    Do you have difficulty bathing yourself, getting dressed or grooming yourself? No    Do you have difficulty using the toilet? No    Do you have difficulty moving around from place to " place? No    Do you have trouble with steps or getting out of a bed or a chair? No    Current Diet Well Balanced Diet    Dental Exam Up to date    Eye Exam Up to date    Exercise (times per week) 7 times per week    Current Exercises Include Bicycling Outdoors;Gardening;Hiking;Home Exercise Program (TV, Computer, Etc.);House Cleaning;Stationary Bicycling/Spin Class;Walking;Yard Work    Do you need help using the phone?  No    Are you deaf or do you have serious difficulty hearing?  No    Do you need help to go to places out of walking distance? No    Do you need help shopping? No    Do you need help preparing meals?  No    Do you need help with housework?  No    Do you need help with laundry? No    Do you need help taking your medications? No    Do you need help managing money? No    Do you ever drive or ride in a car without wearing a seat belt? No    Have you felt unusual stress, anger or loneliness in the last month? No    Who do you live with? Spouse    If you need help, do you have trouble finding someone available to you? No    Have you been bothered in the last four weeks by sexual problems? No    Do you have difficulty concentrating, remembering or making decisions? No        Patient-reported           Age-appropriate Screening Schedule:  Refer to the list below for future screening recommendations based on patient's age, sex and/or medical conditions. Orders for these recommended tests are listed in the plan section. The patient has been provided with a written plan.    Health Maintenance List  Health Maintenance   Topic Date Due    DXA SCAN  03/30/2024    ANNUAL WELLNESS VISIT  07/31/2024    MAMMOGRAM  03/25/2025    LIPID PANEL  07/15/2025    PAP SMEAR  06/08/2026    TDAP/TD VACCINES (4 - Td or Tdap) 10/08/2028    COLORECTAL CANCER SCREENING  09/27/2033    HEPATITIS C SCREENING  Completed    COVID-19 Vaccine  Completed    INFLUENZA VACCINE  Completed    Pneumococcal Vaccine 65+  Completed    ZOSTER VACCINE  " Completed                                                                                                                                                CMS Preventative Services Quick Reference  Risk Factors Identified During Encounter  Chronic Pain: Natural history and expected course discussed. Questions answered.  OTC analgesics as needed. Proper dosing schedule discussed.   Dental Screening Recommended  Vision Screening Recommended    The above risks/problems have been discussed with the patient.  Pertinent information has been shared with the patient in the After Visit Summary.  An After Visit Summary and PPPS were made available to the patient.    Follow Up:   Next Medicare Wellness visit to be scheduled in 1 year.         Additional E&M Note during same encounter follows:  Patient has additional, significant, and separately identifiable condition(s)/problem(s) that require work above and beyond the Medicare Wellness Visit     Chief Complaint  Medicare Wellness-subsequent    Subjective    HPI  Tatyana is also being seen today for additional medical problem/s.             Trigger Finger  - Underwent Trigger finger release on 1/17/25 with Dr. Harsh Cervantes, I personally reviewed operative note of same day. Had both middle and ring finger done. No lifting over 5 pounds, recommend f/u in 10-14 days.     PFO?  TIA-like syndrome  - I personally reviewed note by Dr. Ba Crews of cardiology dated 8/15/24. At that visit she was switched from Lipitor to Crestor because she wanted to have grapefruit juice.   - reports she is doing well with the change to crestor without side effects. Enjoys drinking a glass of grapefruit juice. Would like to have her liver enzymes checked.      Objective   Vital Signs:  /70 (BP Location: Left arm, Patient Position: Sitting, Cuff Size: Adult)   Pulse 57   Temp 97.3 °F (36.3 °C) (Temporal)   Resp 20   Ht 165.1 cm (65\")   Wt 62.6 kg (138 lb)   SpO2 98%   BMI " 22.96 kg/m²   Physical Exam  Vitals reviewed.   Constitutional:       General: She is not in acute distress.     Appearance: Normal appearance. She is normal weight. She is not ill-appearing or toxic-appearing.   HENT:      Head: Normocephalic and atraumatic.      Right Ear: External ear normal.      Left Ear: External ear normal.      Nose: Nose normal. No congestion.      Mouth/Throat:      Mouth: Mucous membranes are moist.      Pharynx: No oropharyngeal exudate or posterior oropharyngeal erythema.   Eyes:      General: No scleral icterus.     Extraocular Movements: Extraocular movements intact.   Neck:      Vascular: No carotid bruit.   Cardiovascular:      Rate and Rhythm: Normal rate and regular rhythm.      Pulses: Normal pulses.      Heart sounds: Normal heart sounds.   Pulmonary:      Effort: Pulmonary effort is normal.      Breath sounds: Normal breath sounds.   Abdominal:      General: Abdomen is flat. There is no distension.   Musculoskeletal:         General: No swelling or tenderness. Normal range of motion.      Cervical back: Normal range of motion and neck supple. No rigidity or tenderness.   Lymphadenopathy:      Cervical: No cervical adenopathy.   Skin:     General: Skin is warm and dry.      Capillary Refill: Capillary refill takes less than 2 seconds.   Neurological:      General: No focal deficit present.      Mental Status: She is alert and oriented to person, place, and time.   Psychiatric:         Mood and Affect: Mood normal.         Behavior: Behavior normal.         Judgment: Judgment normal.                       Results                Assessment and Plan     Problem List Items Addressed This Visit       History of transient ischemic attack (TIA)    Overview     Possible TIA s/p tPA MRI brain 1/16/2023  Echo (1/16/2023): Possible positive bubble study.  Normal LVEF.  Calcification aortic valve         Relevant Orders    Comprehensive metabolic panel    Lipid panel    TIA (transient  ischemic attack)    Current Assessment & Plan     Chronic, improving. Continue ASA and statin. Now changed to crestor. Will repeat fasting lipid and cmp to monitor.         Relevant Orders    Lipid panel    Trigger middle finger of right hand    Current Assessment & Plan     Chronic, improving. Now s/p release. Continue f/u with hand surgery. Reviewed post op precuations          Other Visit Diagnoses       Encounter for Medicare annual wellness exam    -  Primary    Elevated transaminase level        Relevant Orders    Comprehensive metabolic panel                          Follow Up   Return in about 6 months (around 7/21/2025) for Recheck cholesterol, vitamin D, Fasting labs..  Patient was given instructions and counseling regarding her condition or for health maintenance advice. Please see specific information pulled into the AVS if appropriate.  Patient or patient representative verbalized consent for the use of Ambient Listening during the visit with  Lit Gallagher MD for chart documentation. 1/21/2025  09:10 EST

## 2025-01-23 ENCOUNTER — TREATMENT (OUTPATIENT)
Dept: PHYSICAL THERAPY | Facility: CLINIC | Age: 75
End: 2025-01-23
Payer: MEDICARE

## 2025-01-23 DIAGNOSIS — Z47.89 ORTHOPEDIC AFTERCARE: ICD-10-CM

## 2025-01-23 DIAGNOSIS — M65.341 TRIGGER FINGER, RIGHT RING FINGER: ICD-10-CM

## 2025-01-23 DIAGNOSIS — M65.331 TRIGGER MIDDLE FINGER OF RIGHT HAND: Primary | ICD-10-CM

## 2025-01-23 NOTE — PROGRESS NOTES
Physical Therapy Initial Evaluation and Plan of Care  [facility] [place] [facility]ERAP[facility] [place]  [address]", [address] [address]        Patient: Tatyana Willett   : 1950  Diagnosis/ICD-10 Code:  Trigger middle finger of right hand [M65.331]  Referring practitioner: Harsh Cervantes MD  Date of Initial Visit: 2025  Today's Date: 2025  Patient seen for 1 sessions         Visit Diagnoses:    ICD-10-CM ICD-9-CM   1. Trigger middle finger of right hand  M65.331 727.03   2. Trigger finger, right ring finger  M65.341 727.03   3. Orthopedic aftercare  Z47.89 V54.9         Subjective Questionnaire: QuickDASH: 40.91%    Subjective Evaluation    History of Present Illness  Date of onset: 2025  Mechanism of injury: History of triggering of right long/ring fingers for about 1 year, had 2 injections at [facility].  Came under care of [facility]. Underwent trigger release surgery of ring/long fingers.  Scheduled to have sutures removed in 1 week.    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  Quality: needle-like, tight, pulling and discomfort  Relieving factors: rest and support  Aggravating factors: movement, lifting and repetitive movement  Progression: improved    Hand dominance: right    Patient Goals  Patient goal: Return to yard work and sailing.         Objective          Observations     Right Wrist/Hand   Positive for Brendon's nodes, edema, Heberden's nodes and incision (Long/ring finger A1 pulley area.).       Tenderness     Additional Tenderness Details  Surgical sites tender.    Neurological Testing     Sensation     Wrist/Hand     Right   Intact: light touch, pin prick and sharp/dull discrimination    Active Range of Motion     Right Shoulder   Normal active range of motion    Right Elbow   Normal active range of motion    Right Wrist   Normal active range of motion    Right Thumb    Opposition: Thumb WNL in all planes of motion.    Right Digits   Flexion   Index     MCP: 90 degrees    PIP: 108 degrees    DIP: 50 degrees  Middle     MCP: 90 degrees    PIP: 97 degrees    DIP: 30 degrees  Ring     MCP: 90 degrees    PIP: 104 degrees    DIP: 20 degrees  Little     MCP: 90 degrees    PIP: 90 degrees    DIP: 35 degrees  Extension   Index     MCP: 0 degrees    PIP: 0 degrees    DIP: 0 degrees  Middle     MCP: 0 degrees    PIP: 25 degrees    DIP: 0 degrees  Ring     MCP: 0 degrees    PIP: 26 degrees    DIP: 0 degrees  Little     MCP: 0 degrees    PIP: 0 degrees    DIP: 0 degrees    Strength/Myotome Testing     Left Wrist/Hand      (2nd hand position)   Left  strength (2nd hand position) 50 lbs    Thumb Strength  Key/Lateral Pinch     Trial 1: 18 lbs  Palmar/Three-Point Pinch     Trial 1: 17 lbs    Right Wrist/Hand   Wrist extension: 5  Wrist flexion: 5     (2nd hand position)   Right  strength (2nd hand position) 23 lbs    Thumb Strength   Key/Lateral Pinch     Trial 1: 14 lbs  Palmar/Three-Point Pinch     Trial 1: 10 lbs    Tests   Cervical     Right   Positive ULTT1 and ULTT2.     Right Wrist/Hand   Positive extrinsic flexor tightness.   Negative Phalen's sign.           Assessment & Plan       Assessment  Impairments: abnormal or restricted ROM, impaired physical strength and pain with function   Functional limitations: carrying objects, lifting, pulling, uncomfortable because of pain and unable to perform repetitive tasks   Assessment details: Pt. is S/P right long and ring fingers trigger release, surgery on 1/17/2025.  The patient is doing well post operatively, primary closure intact.  Neurological exam is normal today.  Problems:  edema in the long greater than ring finger, discomfort, decrease ROM into extension and flexion, decreased strength, and decrease functional activities.    Prognosis: good    Goals  Plan Goals: Short Term Goals:  met by 4 weeks.  1. Resolve scar  tenderness.    2. Pt. has full PIP joint extension.   3. Increase right  to 30 lbs with tolerable discomfort.   4. Pt. reports pain rating no worse than 1/10.  5.  Patient has improved upper extremity function so the QuickDASH score improves to 25%.    Long Term Goals:  met by 8 weeks.  1. Pt. Independent with strengthening HEP.   2. Pt. returns to normal activities at home and work without complications.   3. Increase right  strength to 45 lbs.   4. Pt has full wrist and finger AROM.   5.  Patient has improved function so the QuickDASH score improves to 15%.    Plan  Therapy options: will be seen for skilled therapy services  Planned modality interventions: cryotherapy, high voltage pulsed current (pain management), ultrasound, iontophoresis and thermotherapy (paraffin bath)  Planned therapy interventions: fine motor coordination training, functional ROM exercises, joint mobilization, manual therapy, orthotic fitting/training, soft tissue mobilization, strengthening, stretching and therapeutic activities  Frequency: 2x week  Duration in weeks: 8        Access Code: [email or web address]  URL: [email or web address]  Date: 01/23/2025  Prepared by: Tyrel Harmon    Exercises  - Seated Wrist Flexor Hook Fist Tendon Gliding  - 6 x daily - 7 x weekly - 1 sets - 10 reps  - Seated Straight Fist AROM  - 6 x daily - 7 x weekly - 1 sets - 10 reps  - Seated Wrist Flexor Full Fist Tendon Gliding  - 6 x daily - 7 x weekly - 1 sets - 10 reps  - Seated Finger PIP Extension PROM  - 6 x daily - 7 x weekly - 1 sets - 10 reps  Timed:         Manual Therapy:         mins  17612;     Therapeutic Exercise:         mins  37644;     Neuromuscular Blake:        mins  29885;    Therapeutic Activity:     18     mins  66888;     Gait Training:           mins  63832;     Ultrasound:          mins  24096;    Ionto                                   mins   12067  Self Care                            mins   69036  [facility]         mins  90048      Un-Timed:  Electrical Stimulation:         mins  86861 ( );  Dry Needling          mins self-pay  Traction          mins 97840  Low Eval     20     mins  31930  Mod Eval          Mins  28327  High Eval                            Mins  76601        Timed Treatment:   18   mins   Total Treatment:     38   mins          PT: Tyrel Harmon PT, T     License Number: [id number]  Electronically signed by Tyrel Harmon PT, 01/23/25, 11:13 AM EST    Medicare Initial Certification  Certification Period: 4/23/2025  I certify that the therapy services are furnished while this patient is under my care.  The services outlined above are required by this patient, and will be reviewed every 90 days.     PHYSICIAN: ________________________________________________________  Harsh Cervantes MD        DATE: ____________________________________________________________    Please sign and return via fax to [phone]. Thank you, [facility].

## 2025-01-30 ENCOUNTER — OFFICE VISIT (OUTPATIENT)
Age: 75
End: 2025-01-30
Payer: MEDICARE

## 2025-01-30 VITALS — TEMPERATURE: 98.1 F

## 2025-01-30 DIAGNOSIS — Z98.890 POST-OPERATIVE STATE: Primary | ICD-10-CM

## 2025-01-30 PROCEDURE — 1160F RVW MEDS BY RX/DR IN RCRD: CPT | Performed by: PLASTIC SURGERY

## 2025-01-30 PROCEDURE — 1159F MED LIST DOCD IN RCRD: CPT | Performed by: PLASTIC SURGERY

## 2025-01-30 PROCEDURE — 99024 POSTOP FOLLOW-UP VISIT: CPT | Performed by: PLASTIC SURGERY

## 2025-01-30 NOTE — PROGRESS NOTES
[facility] Orthopedic     Follow-up Office Visit       Date: 01/30/2025   Patient Name: Tatyana Willett  MRN: [id number]  YOB: 1950    Chief Complaint:   Chief Complaint   Patient presents with    Post-op     2 week s/p Right middle finger trigger finger release; Right ring finger trigger finger release 1/17/25        History of Present Illness:   Tatyana Willett is a 74 y.o. female 2 weeks status post right middle and ring finger trigger release.  She been doing well since surgery.  Reports she does have some paresthesias in the third webspace extending into the middle and ring fingers.  Reports they are slowly improving.  No other concerns.  Has been working with hand therapy.      Subjective   Review of Systems:   Review of Systems   Constitutional:  Negative for chills, fever, unexpected weight gain and unexpected weight loss.   HENT:  Negative for congestion, postnasal drip and rhinorrhea.    Eyes:  Negative for blurred vision.   Respiratory:  Negative for shortness of breath.    Cardiovascular:  Negative for leg swelling.   Gastrointestinal:  Negative for abdominal pain, nausea and vomiting.   Genitourinary:  Negative for difficulty urinating.   Musculoskeletal:  Positive for arthralgias. Negative for gait problem, joint swelling and myalgias.   Skin:  Negative for skin lesions and wound.   Neurological:  Negative for dizziness, weakness, light-headedness and numbness.   Hematological:  Does not bruise/bleed easily.   Psychiatric/Behavioral:  Negative for depressed mood.         Pertinent review of systems per HPI    I reviewed the patient's chief complaint, history of present illness, review of systems, past medical history, surgical history, family history, social history, medications and allergy list in the EMR on 01/30/2025 and agree with the findings above.    Objective    Vital Signs:   Vitals:    01/30/25 1025    Temp: 98.1 °F (36.7 °C)     BMI: There is no height or weight on file to calculate BMI.     General Appearance: No acute distress. Alert and oriented.     Chest:  Non-labored breathing on room air      Ortho Exam:  Right middle and ring finger incisions clear dry intact and healing appropriately.  No catching or locking with flexion.  Patient has full flexion and extension although some tightness with extension of the ring finger at the PIP.  Fingers warm and well-perfused distally  Paresthesias over the ulnar aspect of the middle finger and radial aspect of the ring finger although sensation is grossly intact.  Sensation intact to light touch in the median, radial and ulnar nerve distributions    Imaging/Studies:   Imaging Results (Last 24 Hours)       ** No results found for the last 24 hours. **                Procedures:  Procedures    Quality Measures:   ACP:   ACP discussion was deferred.    Tobacco:   Tatyana Willett  reports that she has never smoked. She has never been exposed to tobacco smoke. She has never used smokeless tobacco.    Assessment / Plan    Assessment/Plan:      Diagnosis Plan   1. Post-operative state            2 weeks status post right middle and ring finger trigger release.  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  Neuropraxia to resolve with time.  Discussed this with the patient recommend suture removal today, continue physical therapy and follow-up in 3 months    Follow Up:   Return in about 3 months (around 4/30/2025).        Harsh Cervantes MD  [facility]

## 2025-01-31 ENCOUNTER — TREATMENT (OUTPATIENT)
Dept: PHYSICAL THERAPY | Facility: CLINIC | Age: 75
End: 2025-01-31
Payer: MEDICARE

## 2025-01-31 DIAGNOSIS — M65.331 TRIGGER MIDDLE FINGER OF RIGHT HAND: Primary | ICD-10-CM

## 2025-01-31 DIAGNOSIS — M65.341 TRIGGER FINGER, RIGHT RING FINGER: ICD-10-CM

## 2025-01-31 NOTE — PROGRESS NOTES
Physical Therapy Daily Treatment Note  [facility] [place]  [address] [facility] [facility] [address]  [facility] [address]      Patient: Tatyana Willett   : 1950  Diagnosis/ICD-10 Code:  Trigger middle finger of right hand [M65.331]  Referring practitioner: Harsh Cervantes MD  Date of Initial Visit: Type: THERAPY  Noted: 2025  Today's Date: 2025  Patient seen for 2 sessions         Visit Diagnoses:    ICD-10-CM ICD-9-CM   1. Trigger middle finger of right hand  M65.331 727.03   2. Trigger finger, right ring finger  M65.341 727.03       Subjective   Tatyana Willett reports: stitches removed yesterday, seem to help with ROM and discomfort. Biggest issue is inability to extend PIP joint.    Objective   OBSERVATION: Positive for Brendon's nodes, edema, Heberden's nodes and incision/scar with eschar  PALPATION: surgical scar moderately tender.  LF PIP joint  PROM: PIP extension 0 deg at end of treatment.  STRENGTH:  moderately week.    See Exercise, Manual, and Modality Logs for complete treatment.     Access Code: [email or web address]  URL: [email or web address]  Date: 2025  Prepared by: Tyrel Harmon    Exercises  - Putty Squeezes  - 2 x daily - 7 x weekly - 3 sets - 10 reps  - Finger Pinch and Pull with Putty  - 2 x daily - 7 x weekly - 3 sets - 10 reps  - Seated Finger Adduction with Putty  - 2 x daily - 7 x weekly - 3 sets - 10 reps    Assessment/Plan  Pt needs splinting to help with PIP extension.  Edema is normal after this surgery. She should recover relatively quick.  Progress per Plan of Care and Progress strengthening /stabilization /functional activity           Timed:         Manual Therapy:    18     mins  39294;     Therapeutic Exercise:    20     mins  37263;     Neuromuscular Blake:        mins  98023;    Therapeutic Activity:     25     mins  33071;     Gait Training:           mins  78849;     Ultrasound:          mins  86323;    Ionto                                    mins   90128  Self Care                            mins   65967  Canalith Repos         mins 54142      Un-Timed:  Electrical Stimulation:         mins  57259 ( );  Dry Needling          mins self-pay  Traction          mins 55887      Timed Treatment:   63   mins   Total Treatment:     63   mins    Tyrel Harmon PT, CHT  [place] License: [id number]

## 2025-02-05 ENCOUNTER — TREATMENT (OUTPATIENT)
Dept: PHYSICAL THERAPY | Facility: CLINIC | Age: 75
End: 2025-02-05
Payer: MEDICARE

## 2025-02-05 DIAGNOSIS — M65.341 TRIGGER FINGER, RIGHT RING FINGER: ICD-10-CM

## 2025-02-05 DIAGNOSIS — M65.331 TRIGGER MIDDLE FINGER OF RIGHT HAND: Primary | ICD-10-CM

## 2025-02-05 DIAGNOSIS — Z47.89 ORTHOPEDIC AFTERCARE: ICD-10-CM

## 2025-02-05 NOTE — PROGRESS NOTES
Physical Therapy Daily Treatment Note  [facility]YSICAL [facility]ERAP[facility] [place]  [address] [facility], [address] [address]      Patient: Tatyana Willett   : 1950  Diagnosis/ICD-10 Code:  Trigger middle finger of right hand [M65.331]  Referring practitioner: Harsh Cervantes MD  Date of Initial Visit: Type: THERAPY  Noted: 2025  Today's Date: 2025  Patient seen for 3 sessions         Visit Diagnoses:    ICD-10-CM ICD-9-CM   1. Trigger middle finger of right hand  M65.331 727.03   2. Trigger finger, right ring finger  M65.341 727.03   3. Orthopedic aftercare  Z47.89 V54.9       Subjective   Tatyana Willett reports: Fingers not as stiff.  Still has some weakness.  Has seen improvement over the past couple weeks.    Objective          Passive Range of Motion     Right Digits   Flexion   Middle     PIP: 108 degrees  Extension   Middle     PIP: 0 degrees    Strength/Myotome Testing     Right Wrist/Hand      (2nd hand position)   Right  strength (2nd hand position) 38 lbs        See Exercise, Manual, and Modality Logs for complete treatment.       Assessment/Plan  Status post of trigger finger release.  Post of surgery she developed significant edema and stiffness into the PIP joint.  She has significant degenerative joint disease of the fingers, but but her range of motion has improved significantly over the past 2 weeks.   is slowly improving as well.  Responding well to rehab and her home program.  Progress per Plan of Care and Progress strengthening /stabilization /functional activity           Timed:         Manual Therapy:    10     mins  39963;     Therapeutic Exercise:    20     mins  03891;     Neuromuscular Blake:        mins  93529;    Therapeutic Activity:     10     mins  92011;     Gait Training:           mins  04292;     Ultrasound:          mins  71294;    Ionto                                   mins   12268  Self Care                            mins   97513  [place]  Repos         mins 89589      Un-Timed:  Electrical Stimulation:         mins  68097 ( );  Dry Needling          mins self-pay  Traction          mins 14216      Timed Treatment:   40   mins   Total Treatment:     40   mins    Tyrel Harmon PT,T  [place] License: [id number]

## 2025-02-12 ENCOUNTER — TREATMENT (OUTPATIENT)
Dept: PHYSICAL THERAPY | Facility: CLINIC | Age: 75
End: 2025-02-12
Payer: MEDICARE

## 2025-02-12 DIAGNOSIS — M65.331 TRIGGER MIDDLE FINGER OF RIGHT HAND: Primary | ICD-10-CM

## 2025-02-12 DIAGNOSIS — Z47.89 ORTHOPEDIC AFTERCARE: ICD-10-CM

## 2025-02-12 DIAGNOSIS — Z98.890 POST-OPERATIVE STATE: Primary | ICD-10-CM

## 2025-02-12 DIAGNOSIS — M65.341 TRIGGER FINGER, RIGHT RING FINGER: ICD-10-CM

## 2025-02-12 NOTE — PROGRESS NOTES
Physical Therapy Daily Treatment Note  [facility] [place] [facility]YSICAL [facility]ERAP[facility] [place]  [address] [facility] [facility] [address]  [facility] [address]      Patient: Tatyana Willett   : 1950  Diagnosis/ICD-10 Code:  Trigger middle finger of right hand [M65.331]  Referring practitioner: Harsh Cervantes MD  Date of Initial Visit: Type: THERAPY  Noted: 2025  Today's Date: 2025  Patient seen for 4 sessions         Visit Diagnoses:    ICD-10-CM ICD-9-CM   1. Trigger middle finger of right hand  M65.331 727.03   2. Trigger finger, right ring finger  M65.341 727.03   3. Orthopedic aftercare  Z47.89 V54.9       Subjective   Tatyana Willett reports: Right long finger is still numb, unchanged.  Ring finger slightly numb, improving. PIP joints sore.    Objective   OBSERVATION: Brendon's nodes, edema, Heberden's nodes at IF/LF/RF PIP & DIP joints right hand  PALPATION: Scar A1 pulley area RF/LF mildly tender, slight hypomobility.  AROM:    Middle     MCP: 90 degrees    PIP: 107 degrees    DIP: 40 degrees  Ring     MCP: 90 degrees    PIP: 110 degrees    DIP: 20 degrees  Extension   Middle     MCP: 0 degrees    PIP: 15 degrees    DIP: 0 degrees  Ring     MCP: 0 degrees    PIP: 15 degrees    DIP: 0 degrees  PROM:   LF and RF PIP extension=5 deg with ERP  STRENGTH:  Left=60 lbs, Right=45 lbs       See Exercise, Manual, and Modality Logs for complete treatment.       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  Range of motion is improving.  Scar mobility improved slightly, scar .  Fitted patient with dynamic PIP extension splint plant/tube to be worn at night.  Warrants continuation of physical therapy.  Progress per Plan of Care and Progress strengthening /stabilization /functional activity           Timed:         Manual Therapy:    20     mins  91501;     Therapeutic Exercise:    23     mins  22132;     Neuromuscular Blake:        mins   14961;    Therapeutic Activity:          mins  44330;     Gait Training:           mins  43706;     Ultrasound:     13     mins  01156;    Ionto                                   mins   88888  Self Care                            mins   34952  Canalith Repos         mins 67725      Un-Timed:  Electrical Stimulation:         mins  62255 ( );  Dry Needling          mins self-pay  Traction          mins 42674  SPLINT:       Timed Treatment:   56   mins   Total Treatment:     56   mins    Tyrel Harmon PT, CHT  [place] License: [id number]

## 2025-02-17 ENCOUNTER — PATIENT MESSAGE (OUTPATIENT)
Dept: INTERNAL MEDICINE | Facility: CLINIC | Age: 75
End: 2025-02-17
Payer: MEDICARE

## 2025-02-17 DIAGNOSIS — M79.671 RIGHT FOOT PAIN: Primary | ICD-10-CM

## 2025-02-19 ENCOUNTER — TREATMENT (OUTPATIENT)
Dept: PHYSICAL THERAPY | Facility: CLINIC | Age: 75
End: 2025-02-19
Payer: MEDICARE

## 2025-02-19 DIAGNOSIS — M65.331 TRIGGER MIDDLE FINGER OF RIGHT HAND: Primary | ICD-10-CM

## 2025-02-19 DIAGNOSIS — Z47.89 ORTHOPEDIC AFTERCARE: ICD-10-CM

## 2025-02-19 DIAGNOSIS — M65.341 TRIGGER FINGER, RIGHT RING FINGER: ICD-10-CM

## 2025-02-19 NOTE — PROGRESS NOTES
Physical Therapy Daily Treatment Note  [facility] [facility]ICAL [facility]ERAP[facility] [place]  [address] [facility] [facility] [address]  [facility] [address]      Patient: Tatyana Willett   : 1950  Diagnosis/ICD-10 Code:  Trigger middle finger of right hand [M65.331]  Referring practitioner: Harsh Cervantes MD  Date of Initial Visit: Type: THERAPY  Noted: 2025  Today's Date: 2025  Patient seen for 5 sessions         Visit Diagnoses:    ICD-10-CM ICD-9-CM   1. Trigger middle finger of right hand  M65.331 727.03   2. Trigger finger, right ring finger  M65.341 727.03   3. Orthopedic aftercare  Z47.89 V54.9       Subjective   Tatyana Willett reports: Fingers are improving.  Indicates mood might be the best they have looked since before the surgery.  Still pain and still feels tension with the night splints but tolerable.    Objective          Strength/Myotome Testing     Left Wrist/Hand      (2nd hand position)   Left  strength (2nd hand position) 56 lbs    Right Wrist/Hand      (2nd hand position)   Right  strength (2nd hand position) 51 lbs      See Exercise, Manual, and Modality Logs for complete treatment.       Assessment/Plan  Boutonniere's deformities of the ring finger and long finger are significantly reduced.  Still has Heberling nodes and Brendon nodes of most IP joints.  Progress per Plan of Care, Progress strengthening /stabilization /functional activity, and Anticipate DC next Visit           Timed:         Manual Therapy:    17     mins  57964;     Therapeutic Exercise:    30     mins  76614;     Neuromuscular Blake:        mins  14791;    Therapeutic Activity:     12     mins  99474;     Gait Training:           mins  83468;     Ultrasound:          mins  31227;    Ionto                                   mins   37164  Self Care                            mins   17215  Canalith Repos         mins 77267      Un-Timed:  Electrical Stimulation:         mins  76682 ( );  Dry Needling           mins self-pay  Traction          mins 47611      Timed Treatment:   59   mins   Total Treatment:     59   mins    Tyrel Harmon PT, T  [place] License: [id number]

## 2025-02-24 ENCOUNTER — OFFICE VISIT (OUTPATIENT)
Age: 75
End: 2025-02-24
Payer: MEDICARE

## 2025-02-24 VITALS
BODY MASS INDEX: 23.29 KG/M2 | WEIGHT: 139.8 LBS | DIASTOLIC BLOOD PRESSURE: 68 MMHG | HEIGHT: 65 IN | SYSTOLIC BLOOD PRESSURE: 102 MMHG

## 2025-02-24 DIAGNOSIS — M79.671 RIGHT FOOT PAIN: Primary | ICD-10-CM

## 2025-02-24 DIAGNOSIS — M77.51 BURSITIS OF RIGHT FOOT: ICD-10-CM

## 2025-02-24 DIAGNOSIS — M77.51 BURSITIS OF RIGHT FOOT: Primary | ICD-10-CM

## 2025-02-24 NOTE — PROGRESS NOTES
[facility] [place] [facility] Office Visit - Anahi Johnson PA-C    Office Visit       Patient Name: Tatyana Willett    Chief Complaint:   Chief Complaint   Patient presents with    Right Foot - Pain       Referring Physician: Lit Gallagher MD    History of Present Illness:   Tatyana Willett is a very pleasant 74 y.o. female who presents to discuss her right lateral foot pain.  She denies any trauma or injury.  She reports pain for several months that is bothersome with weightbearing and walking.  She reports pain at the base of the fifth metatarsal on the plantar aspect.  Occasional pain in the heel.  She has treated with shoewear change.  It bothers her more sometimes barefoot.  Here for further evaluation and treatment recommendations.      Subjective     Review of Systems   Constitutional:  Negative for chills, fever, unexpected weight gain and unexpected weight loss.   HENT:  Negative for congestion, postnasal drip and rhinorrhea.    Eyes:  Negative for blurred vision.   Respiratory:  Negative for shortness of breath.    Cardiovascular:  Negative for leg swelling.   Gastrointestinal:  Negative for abdominal pain, nausea and vomiting.   Genitourinary:  Negative for difficulty urinating.   Musculoskeletal:  Positive for arthralgias. Negative for gait problem, joint swelling and myalgias.   Skin:  Negative for skin lesions and wound.   Neurological:  Negative for dizziness, weakness, light-headedness and numbness.   Hematological:  Does not bruise/bleed easily.   Psychiatric/Behavioral:  Negative for depressed mood.         Past Medical History:   Past Medical History:   Diagnosis Date    Allergic     Allergic rhinitis Lifelong    Moderately severe with nasal polyps    Asthma 2011    Severe flare with bronchoscopy from pine needle exposure    CTS (carpal tunnel syndrome) 2004    Bilateral    Eosinophilic esophagitis 2018    Biopsy-positive    Fracture, finger  2009    left pinkie    GERD (gastroesophageal reflux disease)     HL (hearing loss)     Hyperlipidemia     Knee swelling     Lumbar scoliosis 2013    Recurrent discomfort    Lumbar spinal stenosis 2013    Migraine 2004    hospitalized    Mononucleosis 1962    Osteoarthritis of hands, bilateral     PFO (patent foramen ovale) 02/15/2023    Post menopausal syndrome 2000    Hot flashes with impaired sleep    Preventative health care 2016    Sleep apnea 2018    Sleep disorder     TIA (transient ischemic attack) 08/10/2023    Varicose vein of leg     venous ligation left leg       Past Surgical History:   Past Surgical History:   Procedure Laterality Date    BRONCHOSCOPY  2011    Severe allergic pneumonitis    COLONOSCOPY  2013    Benign study    FRACTURE SURGERY  2009    left pinkie finger    HAND SURGERY  2009    left pinkie    LARYNGOSCOPY  2017    Indirect exam    SKIN SURGERY      BASIL CELL    SQUAMOUS CELL CARCINOMA EXCISION Left     L leg    TONSILLECTOMY  196    TONSILLECTOMY      TRIGGER FINGER RELEASE Right 2025    Ring and long by Dr. Harsh Guillen Billy    VEIN SURGERY Left     Ligation of left leg       Family History:   Family History   Problem Relation Age of Onset    Dementia Mother 70 - 79    Hearing loss Mother     Osteoarthritis Mother     Osteoporosis Mother     Arthritis Mother     Esophageal cancer Father           age 60    Cancer Father         Keller's esophagus    Achalasia Brother     Osteoarthritis Brother     Arthritis Brother     Osteoarthritis Brother         Bilateral hip replacements    Arthritis Brother     Pancreatic cancer Maternal Grandmother     Cancer Maternal Grandmother         Pancreatic    Heart failure Paternal Grandmother     Heart attack Paternal Grandfather          age 65    Heart disease Paternal Grandfather     Breast cancer Neg Hx     Ovarian cancer Neg Hx     Uterine cancer Neg Hx     Colon cancer Neg Hx        Social History:   Social  History     Socioeconomic History    Marital status:    Tobacco Use    Smoking status: Never     Passive exposure: Never    Smokeless tobacco: Never   Vaping Use    Vaping status: Never Used   Substance and Sexual Activity    Alcohol use: No    Drug use: Never    Sexual activity: Yes     Partners: Male     Birth control/protection: Post-menopausal       Medications:   Current Outpatient Medications:     acetaminophen (Tylenol) 325 MG tablet, Take 2 tablets by mouth Every 6 (Six) Hours As Needed for Moderate Pain., Disp: 100 tablet, Rfl: 1    albuterol sulfate  (90 Base) MCG/ACT inhaler, Inhale 2 puffs Every 4 (Four) Hours As Needed for Wheezing or Shortness of Air., Disp: 18 g, Rfl: 1    aspirin 81 MG chewable tablet, Chew 1 tablet Daily., Disp: 30 tablet, Rfl: 0    Calcium Carbonate-Vitamin D (CALTRATE 600+D PO), Take 1 tablet by mouth Daily., Disp: , Rfl:     fluticasone (FLONASE) 50 MCG/ACT nasal spray, Administer 1 spray into the nostril(s) as directed by provider Every Morning., Disp: , Rfl:     Glucosamine-Chondroit-Vit C-Mn (GLUCOSAMINE 1500 COMPLEX PO), Take 1 tablet by mouth daily., Disp: , Rfl:     loratadine (CLARITIN) 10 MG tablet, Take 1 tablet by mouth Daily As Needed., Disp: , Rfl:     montelukast (SINGULAIR) 10 MG tablet, Take 1 tablet by mouth Daily As Needed (as needed)., Disp: 90 tablet, Rfl: 1    Multiple Vitamins-Minerals (WOMENS ONE DAILY) tablet, Take 1 tablet by mouth Daily., Disp: , Rfl:     rosuvastatin (CRESTOR) 20 MG tablet, Take 1 tablet by mouth Daily., Disp: 90 tablet, Rfl: 3    Scopolamine 1 MG/3DAYS patch, Place 1 patch on the skin as directed by provider Every 72 (Seventy-Two) Hours. Apply at least 4 hours prior to event, do not use for more than 6 consecutive days., Disp: 4 each, Rfl: 0    vitamin C (ASCORBIC ACID) 500 MG tablet, Take 1 tablet by mouth Daily., Disp: , Rfl:     Vitamin D, Cholecalciferol, 25 MCG (1000 UT) capsule, Take 1 capsule by mouth Daily., Disp:  ", Rfl:     Allergies:   Allergies   Allergen Reactions    Shellfish Allergy Hives     oysters    Poison Ivy Extract [Poison Ivy Extract] Rash       I have reviewed and updated the following portions of the patient's history and review of systems: allergies, current medications, past family history, past medical history, past social history, past surgical history and problem list.    Objective      Vital Signs:   Vitals:    02/24/25 1340   BP: 102/68   Weight: 63.4 kg (139 lb 12.8 oz)   Height: 163.8 cm (64.5\")       Ortho Exam:  Right foot exam: Tender at the base of the fifth metatarsal with no swelling or erythema.  Remainder the foot and ankle are nontender.  She has normal range of motion with 5/5 motor strength.  Able to walk on toes and heels.  Neurovascular intact distally.  Pulses 2+.    Results Review:   XR Foot 3+ View Right  Indication: Right foot pain.      Views: AP lateral and oblique views of the feet are submitted.     Impression: There is no widening. There is no fracture subluxation or   dislocation. There are no acute changes. Severe hallux valgus with 1st mtp   joint degenerative changes, angulation of the great toe, hammering of the   adjacent toes. Large plantar calcaneal enthesophyte.    Comparison: None.          Assessment / Plan      Assessment:  Diagnoses and all orders for this visit:    1. Right foot pain (Primary)  -     XR Foot 3+ View Right    2. Bursitis of right foot        Quality Metrics:   BMI:   BMI is within normal parameters. No other follow-up for BMI required.       Tobacco:   Tatyana Willett  reports that she has never smoked. She has never been exposed to tobacco smoke. She has never used smokeless tobacco.         Plan:  Right foot bursitis.  I reviewed today's x-rays, clinical findings past and current treatment the patient.  X-rays show no evidence of acute findings with bunion and first MTP joint degenerative changes.  She reports this has been bothering her for " months intermittently.  We discussed differential diagnosis of the base of the fifth metatarsal to include fracture, stress fracture, bursitis, insertional tendinitis.  Given that it has been bothering her for months and intermittently, I do not think she has a stress fracture.  We discussed further treatment including physical therapy, topical anti-inflammatories, further imaging with MRI.  Patient would like to try round of physical therapy.  I will see her back in 6 weeks for repeat exam, sooner if needed.    Anahi Johnson PA-C  [facility] [place] [facility]       Dictated using Dragon Speech Recognition.

## 2025-03-06 ENCOUNTER — TREATMENT (OUTPATIENT)
Dept: PHYSICAL THERAPY | Facility: CLINIC | Age: 75
End: 2025-03-06
Payer: MEDICARE

## 2025-03-06 DIAGNOSIS — M77.51 BURSITIS OF RIGHT FOOT: Primary | ICD-10-CM

## 2025-03-06 NOTE — PROGRESS NOTES
Physical Therapy Initial Evaluation and Plan of Care  [facility] [place] [facility] [place]  [address] [facility], [address] [address]        Patient: Tatyana Willett   : 1950  Diagnosis/ICD-10 Code:  Bursitis of right foot [M77.51]  Referring practitioner: ZEB Mccray*  Date of Initial Visit: 3/6/2025  Today's Date: 3/6/2025  Patient seen for 1 sessions         Visit Diagnoses:    ICD-10-CM ICD-9-CM   1. Bursitis of right foot  M77.51 726.79         Subjective Questionnaire: LEFS: 79/80    Subjective Evaluation    History of Present Illness  Mechanism of injury: 6+ months of right foot pain worsening with time.  No known injury.  Did have similar issues several years ago, did resolve at the time.    CURRENT COMPLAINT  Intermittent right lateral 5th metatarsal base area pain.  No paraesthesia/anaesthesia.  Usually does OK walking.  Worse with: at night, or pressure over the area.  Better with: Voltaren Gel      Patient Occupation: Retired Pain  Current pain ratin  At best pain ratin  At worst pain ratin           Objective          Tenderness     Right Ankle/Foot   Tenderness in the fifth metatarsal base. No tenderness in the Achilles insertion, anterior talofibular ligament, calcaneofibular ligament, peroneal tendon, plantar fascia and posterior tibial tendon.     Active Range of Motion     Right Knee   Normal active range of motion    Right Ankle/Foot   Normal active range of motion    Joint Play     Right Ankle/Foot  Joints within functional limits are the talocrural joint, subtalar joint, midfoot and forefoot.     Strength/Myotome Testing     Right Ankle/Foot   Normal strength    Tests     Right Ankle/Foot   Negative for anterior drawer, eversion talar tilt, metatarsal squeeze and posterior drawer.     Ambulation   Weight-Bearing Status   Weight-Bearing Status (Left): full weight bearing   Weight-Bearing Status (Right): full weight-bearing    Assistive device used:  none    Observational Gait   Gait: antalgic           Assessment & Plan       Assessment  Impairments: activity intolerance and pain with function   Assessment details: 74 year old female presenting with chronic right lateral 5th metatarsal base.  Signs and symptoms of right lateral foot bursitis.  Peroneal tendon appear to normal, ROM is normal, strength is normal.  Prognosis: good    Goals  Plan Goals: Short-term goals to be met in 4 weeks  1.  Patient reports 50% decrease in discomfort in right lateral foot.  2.  Patient is able to sleep through the night without being awakened by pain when right side-lying.  3.  Patient is able to continue with walking as needed.  Long term goals to be met in 8 weeks  1.  Patient is independent with each phase of home exercise program and rehab.  2.  Patient reports complete resolution of right lateral foot discomfort.    Plan  Therapy options: will be seen for skilled therapy services  Planned modality interventions: contrast bath immersion, cryotherapy, dry needling, high voltage pulsed current (pain management), hydrotherapy, ultrasound and iontophoresis  Planned therapy interventions: abdominal trunk stabilization, balance/weight-bearing training, flexibility, functional ROM exercises, home exercise program, joint mobilization, therapeutic activities, stretching, strengthening, soft tissue mobilization, orthotic fitting/training, neuromuscular re-education and manual therapy  Frequency: 1x week  Duration in weeks: 8        Timed:         Manual Therapy:         mins  34006;     Therapeutic Exercise:         mins  48262;     Neuromuscular Blake:        mins  75771;    Therapeutic Activity:          mins  61217;     Gait Training:           mins  97025;     Ultrasound:     15     mins  75235;    Ionto                                   mins   99300  Self Care                            mins   00071  Canalith Repos         mins 38894      Un-Timed:  Electrical Stimulation:    20      mins  18318 ( );  Dry Needling          mins self-pay  Traction          mins 59180  Low Eval     20     Mins  48636  Mod Eval          Mins  45161  High Eval                            Mins  20057        Timed Treatment:   15   mins   Total Treatment:     55   mins          PT: Tyrel Harmon PT     License Number: [id number]  Electronically signed by Tyrel Harmon PT, 03/06/25, 10:34 AM EST    Medicare Initial Certification  Certification Period: 6/4/2025  I certify that the therapy services are furnished while this patient is under my care.  The services outlined above are required by this patient, and will be reviewed every 90 days.     PHYSICIAN: ________________________________________________________  Anahi Johnson PA-C        DATE: ____________________________________________________________    Please sign and return via fax to [phone]. Thank you, [facility].

## 2025-03-10 LAB
NCCN CRITERIA FLAG: NORMAL
TYRER CUZICK SCORE: 2.8

## 2025-03-12 ENCOUNTER — TREATMENT (OUTPATIENT)
Dept: PHYSICAL THERAPY | Facility: CLINIC | Age: 75
End: 2025-03-12
Payer: MEDICARE

## 2025-03-12 DIAGNOSIS — M77.51 BURSITIS OF RIGHT FOOT: Primary | ICD-10-CM

## 2025-03-12 NOTE — PROGRESS NOTES
Physical Therapy Daily Treatment Note  [facility] [place]  [address] [facility] [address]  [facility] [address]      Patient: Tatyana Willett   : 1950  Diagnosis/ICD-10 Code:  Bursitis of right foot [M77.51]  Referring practitioner: ZEB Mccray*  Date of Initial Visit: Type: THERAPY  Noted: 3/6/2025  Today's Date: 3/12/2025  Patient seen for 2 sessions         Visit Diagnoses:    ICD-10-CM ICD-9-CM   1. Bursitis of right foot  M77.51 726.79       Subjective   Tatyana Willett reports: Less pain for couple days after initial treatment.  Still feels some discomfort at the base of the fifth metatarsal.    Objective          Tenderness     Right Ankle/Foot   Tenderness in the fifth metatarsal base.     Active Range of Motion     Right Ankle/Foot   Normal active range of motion    Ambulation   Weight-Bearing Status   Weight-Bearing Status (Left): full weight bearing   Weight-Bearing Status (Right): full weight-bearing    Assistive device used: none    Observational Gait   Gait: within functional limits         See Exercise, Manual, and Modality Logs for complete treatment.       Assessment/Plan  Bursitis around the base of the fifth metatarsal right foot.  Strength is good emphasis will be to reduce inflammation thus modalities.  Progress per Plan of Care and Progress strengthening /stabilization /functional activity           Timed:         Manual Therapy:         mins  13445;     Therapeutic Exercise:    10     mins  23002;     Neuromuscular Blake:        mins  27994;    Therapeutic Activity:          mins  03763;     Gait Training:           mins  57811;     Ultrasound:     15     mins  57500;    Ionto                                   mins   28240  Self Care                            mins   39222  Canalith Repos         mins 10360      Un-Timed:  Electrical Stimulation:    20     mins  18304 ( );  Dry Needling          mins self-pay  Traction          mins 19818      Timed  Treatment:   25   mins   Total Treatment:     45   mins    Tyrel Harmon, PT  [place] License: [id number]

## 2025-03-18 ENCOUNTER — TREATMENT (OUTPATIENT)
Dept: PHYSICAL THERAPY | Facility: CLINIC | Age: 75
End: 2025-03-18
Payer: MEDICARE

## 2025-03-18 DIAGNOSIS — M77.51 BURSITIS OF RIGHT FOOT: Primary | ICD-10-CM

## 2025-03-19 NOTE — PROGRESS NOTES
Physical Therapy Daily Treatment Note  [facility] [place]  [address] [facility] [address] [facility] [address]      Patient: Tatyana Willett   : 1950  Diagnosis/ICD-10 Code:  Bursitis of right foot [M77.51]  Referring practitioner: No ref. provider found  Date of Initial Visit: Type: THERAPY  Noted: 3/6/2025  Today's Date: 3/18/2025  Patient seen for 3 sessions         Visit Diagnoses:    ICD-10-CM ICD-9-CM   1. Bursitis of right foot  M77.51 726.79       Subjective   Tatyana Willett reports: PT helps for about 3 days, then symptoms return to lateral foot.    Objective   Palpation   Tenderness in the fifth metatarsal base.   Active Range of Motion   Right Ankle/Foot   Normal active range of motion  Ambulation   Weight-Bearing Status   Weight-Bearing Status (Left): full weight bearing   Weight-Bearing Status (Right): full weight-bearing    Assistive device used: none  Observational Gait   Gait: within functional limits      See Exercise, Manual, and Modality Logs for complete treatment.       Assessment/Plan  Chronic bursitis and possible tendinitis at the base of the right fifth metatarsal.  Sounds as though she is responding positively to physical therapy.  We does need to persist with activities to reduce inflammation around the bursa.  Progress per Plan of Care           Timed:         Manual Therapy:         mins  41141;     Therapeutic Exercise:    13     mins  51594;     Neuromuscular Blake:        mins  75287;    Therapeutic Activity:          mins  07275;     Gait Training:           mins  41462;     Ultrasound:     15     mins  20173;    Ionto                                   mins   93494  Self Care                            mins   96174  Canalith Repos         mins 42629      Un-Timed:  Electrical Stimulation:    10     mins  11606 ( );  Dry Needling          mins self-pay  Traction          mins 05122      Timed Treatment:   28   mins   Total Treatment:     38    mins    Tyrel Harmon, PT  [place] License: [id number]

## 2025-03-25 ENCOUNTER — HOSPITAL ENCOUNTER (OUTPATIENT)
Dept: MAMMOGRAPHY | Facility: HOSPITAL | Age: 75
Discharge: HOME | End: 2025-03-25
Admitting: STUDENT IN AN ORGANIZED HEALTH CARE EDUCATION/TRAINING PROGRAM
Payer: MEDICARE

## 2025-03-25 ENCOUNTER — TREATMENT (OUTPATIENT)
Dept: PHYSICAL THERAPY | Facility: CLINIC | Age: 75
End: 2025-03-25
Payer: MEDICARE

## 2025-03-25 DIAGNOSIS — M77.51 BURSITIS OF RIGHT FOOT: Primary | ICD-10-CM

## 2025-03-25 DIAGNOSIS — Z12.31 ENCOUNTER FOR SCREENING MAMMOGRAM FOR BREAST CANCER: ICD-10-CM

## 2025-03-25 PROCEDURE — 77063 BREAST TOMOSYNTHESIS BI: CPT

## 2025-03-25 PROCEDURE — 77067 SCR MAMMO BI INCL CAD: CPT

## 2025-03-27 NOTE — PROGRESS NOTES
Physical Therapy Daily Treatment Note  [facility] [place]  [address] [address]      Patient: Tatyana Willett   : 1950  Diagnosis/ICD-10 Code:  Bursitis of right foot [M77.51]  Referring practitioner: Harsh Cervantes MD  Date of Initial Visit: Type: THERAPY  Noted: 3/6/2025  Today's Date: 3/25/2025  Patient seen for 4 sessions         Visit Diagnoses:    ICD-10-CM ICD-9-CM   1. Bursitis of right foot  M77.51 726.79       Subjective   Tatyana Willett reports: After last treatment almost no pain for couple days.  Pain has returned some but overall significantly improved, although not completely resolved.    Objective  Observation   The area of swelling around the base of the fifth metatarsal is very minimal now.  Palpation   Tenderness in the fifth metatarsal base.   Active Range of Motion   Right Ankle/Foot   Normal active range of motion  Ambulation   Weight-Bearing Status   Weight-Bearing Status (Left): full weight bearing   Weight-Bearing Status (Right): full weight-bearing    Assistive device used: none  Observational Gait   Gait: within functional limits      See Exercise, Manual, and Modality Logs for complete treatment.       Assessment/Plan  Patient responded very well to modalities and home exercise program.  At this point I feel like she is going to continue to improve.  We are going to see how she does with just the home program.  If she has an exacerbation she can contact us we will get her back on the schedule.  If we do not hear from her in the next 30 days she will be consider discharge from our services.  Progress per Plan of Care           Timed:         Manual Therapy:         mins  93378;     Therapeutic Exercise:         mins  52944;     Neuromuscular Blake:        mins  78672;    Therapeutic Activity:          mins  61806;     Gait Training:           mins  14069;     Ultrasound:     15     mins  94478;    Ionto                                    mins   01194  Self Care                            mins   52633  Canalith Repos         mins 02269      Un-Timed:  Electrical Stimulation:    10     mins  33816 ( );  Dry Needling          mins self-pay  Traction          mins 83114      Timed Treatment:   25   mins   Total Treatment:     25   mins    Tyrel Harmon, PT  [place] License: [id number]

## 2025-04-07 ENCOUNTER — OFFICE VISIT (OUTPATIENT)
Age: 75
End: 2025-04-07
Payer: MEDICARE

## 2025-04-07 VITALS
BODY MASS INDEX: 24.07 KG/M2 | HEIGHT: 64 IN | WEIGHT: 141 LBS | DIASTOLIC BLOOD PRESSURE: 60 MMHG | SYSTOLIC BLOOD PRESSURE: 106 MMHG

## 2025-04-07 DIAGNOSIS — M77.51 BURSITIS OF RIGHT FOOT: Primary | ICD-10-CM

## 2025-04-07 PROCEDURE — 1159F MED LIST DOCD IN RCRD: CPT | Performed by: PHYSICIAN ASSISTANT

## 2025-04-07 PROCEDURE — 99212 OFFICE O/P EST SF 10 MIN: CPT | Performed by: PHYSICIAN ASSISTANT

## 2025-04-07 PROCEDURE — 1160F RVW MEDS BY RX/DR IN RCRD: CPT | Performed by: PHYSICIAN ASSISTANT

## 2025-04-07 NOTE — PROGRESS NOTES
[facility] [place] Up       Office Follow Up Visit       Patient Name: Tatyana Willett    Chief Complaint:   Chief Complaint   Patient presents with    Follow-up     6 week follow up - Right foot pain       Referring Physician: No ref. provider found    History of Present Illness:   Tatyana Willett returns to clinic today for follow-up right foot bursitis.  She has been doing physical therapy reporting 85 to 90% improved pain.  Occasional pain every once in a while.  No new symptoms.      Subjective     Review of Systems   Musculoskeletal:  Positive for arthralgias.   All other systems reviewed and are negative.       I have reviewed and updated the following portions of the patient's history and review of systems: allergies, current medications, past family history, past medical history, past social history, past surgical history and problem list.    Medications:   Current Outpatient Medications:     acetaminophen (Tylenol) 325 MG tablet, Take 2 tablets by mouth Every 6 (Six) Hours As Needed for Moderate Pain., Disp: 100 tablet, Rfl: 1    albuterol sulfate  (90 Base) MCG/ACT inhaler, Inhale 2 puffs Every 4 (Four) Hours As Needed for Wheezing or Shortness of Air., Disp: 18 g, Rfl: 1    aspirin 81 MG chewable tablet, Chew 1 tablet Daily., Disp: 30 tablet, Rfl: 0    Calcium Carbonate-Vitamin D (CALTRATE 600+D PO), Take 1 tablet by mouth Daily., Disp: , Rfl:     fluticasone (FLONASE) 50 MCG/ACT nasal spray, Administer 1 spray into the nostril(s) as directed by provider Every Morning., Disp: , Rfl:     Glucosamine-Chondroit-Vit C-Mn (GLUCOSAMINE 1500 COMPLEX PO), Take 1 tablet by mouth daily., Disp: , Rfl:     loratadine (CLARITIN) 10 MG tablet, Take 1 tablet by mouth Daily As Needed., Disp: , Rfl:     montelukast (SINGULAIR) 10 MG tablet, Take 1 tablet by mouth Daily As Needed (as needed)., Disp: 90 tablet, Rfl: 1    Multiple Vitamins-Minerals  "(WOMENS ONE DAILY) tablet, Take 1 tablet by mouth Daily., Disp: , Rfl:     rosuvastatin (CRESTOR) 20 MG tablet, Take 1 tablet by mouth Daily., Disp: 90 tablet, Rfl: 3    Scopolamine 1 MG/3DAYS patch, Place 1 patch on the skin as directed by provider Every 72 (Seventy-Two) Hours. Apply at least 4 hours prior to event, do not use for more than 6 consecutive days., Disp: 4 each, Rfl: 0    vitamin C (ASCORBIC ACID) 500 MG tablet, Take 1 tablet by mouth Daily., Disp: , Rfl:     Vitamin D, Cholecalciferol, 25 MCG (1000 UT) capsule, Take 1 capsule by mouth Daily., Disp: , Rfl:     Allergies:   Allergies   Allergen Reactions    Shellfish Allergy Hives     oysters    Poison Ivy Extract [Poison Ivy Extract] Rash         Objective      Vital Signs:   Vitals:    04/07/25 0921   BP: 106/60   Weight: 64 kg (141 lb)   Height: 163.8 cm (64.49\")       Ortho Exam:  Right foot exam: Very mildly tender at the base of the fifth metatarsal.  No swelling.    Results Review:  Mammo Screening Digital Tomosynthesis Bilateral With CAD  Narrative: DIGITAL SCREENING MAMMOGRAM WITH TOMOSYNTHESIS     HISTORY: Routine screening.     IMAGE COMPARISON: 3/21/2024, 3/17/2023, 1/30/2023, 1/27/2022, 1/26/2021.     TECHNIQUE:  Low dose full field digital breast tomosynthesis imaging was  performed with 2D and 3D acquisitions consisting of bilateral CC and MLO  views.     FINDINGS: The breast tissue is extremely dense.  This does lower the  sensitivity of mammography. The fibroglandular pattern appears stable.   There is no mass, worrisome microcalcifications, or architectural  distortion to suggest development of malignancy.     Impression: No findings suspicious for malignancy.     ACR BI-RADS CATEGORY:  1, NEGATIVE     RECOMMENDATION: Yearly mammogram, yearly clinical breast exam, and  encourage self breast awareness.     CAD was used.     The standard false negative rate of mammography is between 10% and 25%.   Complex patterns or increased breast " density will markedly elevate the  false negative rate of mammography.     A letter, in lay terminology, with the results of this exam will be  mailed to the patient.     At our facility, a triangular marker is positioned over a palpable area  of concern indicated by the patient. A Beaver marker is placed over a  visible skin lesion. A linear marker indicates a scar.       If there is a palpable area of concern, biopsy should be considered  regardless of imaging findings.           3/31/2025 7:03 PM by Dr. Corazon Mensah MD on Workstation: [employer]          Mammo Screening Digital Tomosynthesis Bilateral With CAD  Result Date: 3/31/2025  No findings suspicious for malignancy.  ACR BI-RADS CATEGORY:  1, NEGATIVE  RECOMMENDATION: Yearly mammogram, yearly clinical breast exam, and encourage self breast awareness.  CAD was used.  The standard false negative rate of mammography is between 10% and 25%. Complex patterns or increased breast density will markedly elevate the false negative rate of mammography.  A letter, in lay terminology, with the results of this exam will be mailed to the patient.  At our facility, a triangular marker is positioned over a palpable area of concern indicated by the patient. A Beaver marker is placed over a visible skin lesion. A linear marker indicates a scar.   If there is a palpable area of concern, biopsy should be considered regardless of imaging findings.    3/31/2025 7:03 PM by Dr. Corazon Mensah MD on Workstation: [employer]          Assessment / Plan      Assessment:   Diagnoses and all orders for this visit:    1. Bursitis of right foot (Primary)        Quality Metrics:   BMI:   BMI is within normal parameters. No other follow-up for BMI required.       Tobacco:   Tatyana Willett  reports that she has never smoked. She has never been exposed to tobacco smoke. She has never used smokeless tobacco.      Plan:  Right foot bursitis 85 to 90% improved with physical therapy activity  modification.  We discussed further treatment including continued exercises at home.  Hopefully things will continue to improve and resolve.  If pain returns or becomes worse, we may consider injection.  She will return to see me as needed.    Anahi Johnson PA-C  [facility]    Dictated using Dragon Speech Recognition.

## 2025-05-01 ENCOUNTER — OFFICE VISIT (OUTPATIENT)
Age: 75
End: 2025-05-01
Payer: MEDICARE

## 2025-05-01 VITALS
BODY MASS INDEX: 23.87 KG/M2 | WEIGHT: 139.8 LBS | SYSTOLIC BLOOD PRESSURE: 102 MMHG | HEIGHT: 64 IN | DIASTOLIC BLOOD PRESSURE: 62 MMHG

## 2025-05-01 DIAGNOSIS — Z98.890 POST-OPERATIVE STATE: ICD-10-CM

## 2025-05-01 DIAGNOSIS — G56.03 CARPAL TUNNEL SYNDROME, BILATERAL: Primary | ICD-10-CM

## 2025-05-01 RX ORDER — TRIAMCINOLONE ACETONIDE 40 MG/ML
20 INJECTION, SUSPENSION INTRA-ARTICULAR; INTRAMUSCULAR
Status: COMPLETED | OUTPATIENT
Start: 2025-05-01 | End: 2025-05-01

## 2025-05-01 RX ORDER — LIDOCAINE HYDROCHLORIDE 10 MG/ML
5 INJECTION, SOLUTION EPIDURAL; INFILTRATION; INTRACAUDAL; PERINEURAL
Status: COMPLETED | OUTPATIENT
Start: 2025-05-01 | End: 2025-05-01

## 2025-05-01 RX ADMIN — TRIAMCINOLONE ACETONIDE 20 MG: 40 INJECTION, SUSPENSION INTRA-ARTICULAR; INTRAMUSCULAR at 10:26

## 2025-05-01 RX ADMIN — LIDOCAINE HYDROCHLORIDE 5 ML: 10 INJECTION, SOLUTION EPIDURAL; INFILTRATION; INTRACAUDAL; PERINEURAL at 10:26

## 2025-05-01 NOTE — PROGRESS NOTES
Procedure   - Hand/Upper Extremity Injection: bilateral carpal tunnel for carpal tunnel syndrome on 5/1/2025 10:26 AM  Indications: pain  Details: 27 G needle, volar approach  Medications (Right): 20 mg triamcinolone acetonide 40 MG/ML; 5 mL lidocaine PF 1% 1 %  Medications (Left): 20 mg triamcinolone acetonide 40 MG/ML; 5 mL lidocaine PF 1% 1 %  Outcome: tolerated well, no immediate complications  Procedure, treatment alternatives, risks and benefits explained, specific risks discussed. Consent was given by the patient. Immediately prior to procedure a time out was called to verify the correct patient, procedure, equipment, support staff and site/side marked as required. Patient was prepped and draped in the usual sterile fashion.

## 2025-05-01 NOTE — PROGRESS NOTES
"                                                                 [facility]     Follow-up Office Visit       Date: 05/01/2025   Patient Name: Tatyana Willett  MRN: [id number]  YOB: 1950    Chief Complaint:   Chief Complaint   Patient presents with    Follow-up     3 month follow up s/p Right middle finger trigger finger release; Right ring finger trigger finger release 1/17/25        History of Present Illness:   Tatyana Willett is a 74 y.o. female presents for follow-up 3 months status post right middle finger trigger finger release.  She been doing well from a trigger finger standpoint reports no pain catching or locking of her right middle finger.  She has new complaint regarding bilateral hand numbness and tingling.  symptoms are worse at night and wake her from sleep.  She also reports sensitivity of her bilateral wrist.  She has had previous corticosteroid injections with temporary relief of symptoms.      Subjective   Review of Systems:   Review of Systems   Musculoskeletal:  Positive for arthralgias.   All other systems reviewed and are negative.       Pertinent review of systems per HPI    I reviewed the patient's chief complaint, history of present illness, review of systems, past medical history, surgical history, family history, social history, medications and allergy list in the EMR on 05/01/2025 and agree with the findings above.    Objective    Vital Signs:   Vitals:    05/01/25 1014   BP: 102/62   Weight: 63.4 kg (139 lb 12.8 oz)   Height: 163.8 cm (64.49\")     BMI: Body mass index is 23.63 kg/m².     General Appearance: No acute distress. Alert and oriented.     Chest:  Non-labored breathing on room air      Ortho Exam:  Right trigger finger incision well-healed.  Positive Tinel over the bilateral carpal tunnels.  Positive bilateral carpal compression test.  Mild thenar wasting bilaterally.  Negative Tinel at the bilateral Guyon's canal.  Negative Tinel at the " bilateral cubital tunnels.  Fingers warm and well-perfused distally  Sensation intact to light touch in the median, radial and ulnar nerve distributions    Imaging/Studies:   Imaging Results (Last 24 Hours)       ** No results found for the last 24 hours. **                Procedures:  Procedures    Quality Measures:   ACP:   ACP discussion was deferred.    Tobacco:   Tatyana Willett  reports that she has never smoked. She has never been exposed to tobacco smoke. She has never used smokeless tobacco.    Assessment / Plan    Assessment/Plan:      Diagnosis Plan   1. Carpal tunnel syndrome, bilateral        2. Post-operative state            Patient presents for follow-up of right middle finger trigger finger.  She been doing well from a trigger finger standpoint has full range of motion and use of the right middle finger.  She has a new concern regarding bilateral hand numbness and tingling and evidence of carpal tunnel syndrome on exam.  Discussed treatment options including bracing anti-inflammatories corticosteroid injection and surgery.  The patient has tried and failed bracing anti-inflammatories.  Recommend bilateral wrist corticosteroid injection today.  Follow-up as needed if symptoms persist or worsen.    Procedure Note- Carpal Tunnel Injection:    I discussed with the patient the potential benefits of performing therapeutic injections as well as potential risks including but not limited to infection, swelling, pain, bleeding, bruising, nerve/vessel damage, skin color changes, transient elevation in blood glucose levels, and fat atrophy. After informed consent and after the areas were prepped with chlorhexadine soap, ethyl chloride was used to numb the skin. The palmaris as a landmark, 20mg of Kenalog and 0.5 cc of 1% lidocaine was injected into the bilateral carpal tunnel, taking care to avoid injecting directly into the median nerve.  The patient tolerated the procedure well. There were no complications.  A sterile dressing was placed over the injection sites.      Follow Up:   Return if symptoms worsen or fail to improve.        Harsh Cervantes MD  [facility]

## 2025-05-02 DIAGNOSIS — J45.20 MILD INTERMITTENT ASTHMA WITHOUT COMPLICATION: ICD-10-CM

## 2025-05-02 DIAGNOSIS — J30.2 SEASONAL ALLERGIC RHINITIS, UNSPECIFIED TRIGGER: ICD-10-CM

## 2025-05-02 RX ORDER — MONTELUKAST SODIUM 10 MG/1
10 TABLET ORAL DAILY
Qty: 90 TABLET | Refills: 1 | Status: SHIPPED | OUTPATIENT
Start: 2025-05-02

## 2025-05-27 ENCOUNTER — HOSPITAL ENCOUNTER (OUTPATIENT)
Dept: BONE DENSITY | Facility: HOSPITAL | Age: 75
Discharge: HOME | End: 2025-05-27
Admitting: STUDENT IN AN ORGANIZED HEALTH CARE EDUCATION/TRAINING PROGRAM
Payer: MEDICARE

## 2025-05-27 DIAGNOSIS — Z78.0 ASYMPTOMATIC POSTMENOPAUSAL STATE: ICD-10-CM

## 2025-05-27 PROCEDURE — 77080 DXA BONE DENSITY AXIAL: CPT

## 2025-07-24 ENCOUNTER — OFFICE VISIT (OUTPATIENT)
Dept: INTERNAL MEDICINE | Facility: CLINIC | Age: 75
End: 2025-07-24
Payer: MEDICARE

## 2025-07-24 VITALS
BODY MASS INDEX: 23.23 KG/M2 | WEIGHT: 137.4 LBS | SYSTOLIC BLOOD PRESSURE: 112 MMHG | TEMPERATURE: 97.3 F | DIASTOLIC BLOOD PRESSURE: 64 MMHG | HEART RATE: 84 BPM

## 2025-07-24 DIAGNOSIS — Z86.73 HISTORY OF TRANSIENT ISCHEMIC ATTACK (TIA): ICD-10-CM

## 2025-07-24 DIAGNOSIS — E53.8 B12 DEFICIENCY: ICD-10-CM

## 2025-07-24 DIAGNOSIS — R79.89 ABNORMAL THYROID BLOOD TEST: ICD-10-CM

## 2025-07-24 DIAGNOSIS — M85.851 OSTEOPENIA OF NECKS OF BOTH FEMURS: ICD-10-CM

## 2025-07-24 DIAGNOSIS — Z00.00 ROUTINE HEALTH MAINTENANCE: ICD-10-CM

## 2025-07-24 DIAGNOSIS — Z91.89 FRACTURE RISK ASSESSMENT SCORE (FRAX) INDICATING GREATER THAN 20% RISK FOR MAJOR OSTEOPOROSIS-RELATED FRACTURE: ICD-10-CM

## 2025-07-24 DIAGNOSIS — Z13.6 SCREENING FOR ISCHEMIC HEART DISEASE: ICD-10-CM

## 2025-07-24 DIAGNOSIS — R93.89 ABNORMAL FINDINGS ON DIAGNOSTIC IMAGING OF OTHER SPECIFIED BODY STRUCTURES: ICD-10-CM

## 2025-07-24 DIAGNOSIS — E55.9 VITAMIN D DEFICIENCY: Primary | ICD-10-CM

## 2025-07-24 DIAGNOSIS — M85.852 OSTEOPENIA OF NECKS OF BOTH FEMURS: ICD-10-CM

## 2025-07-24 LAB
25(OH)D3 SERPL-MCNC: 65.6 NG/ML (ref 30–100)
ALBUMIN SERPL-MCNC: 4.2 G/DL (ref 3.5–5.2)
ALBUMIN/GLOB SERPL: 1.6 G/DL
ALP SERPL-CCNC: 48 U/L (ref 39–117)
ALT SERPL W P-5'-P-CCNC: 28 U/L (ref 1–33)
ANION GAP SERPL CALCULATED.3IONS-SCNC: 9.3 MMOL/L (ref 5–15)
AST SERPL-CCNC: 30 U/L (ref 1–32)
BASOPHILS # BLD AUTO: 0.04 10*3/MM3 (ref 0–0.2)
BASOPHILS NFR BLD AUTO: 1.2 % (ref 0–1.5)
BILIRUB SERPL-MCNC: 0.4 MG/DL (ref 0–1.2)
BUN SERPL-MCNC: 13 MG/DL (ref 8–23)
BUN/CREAT SERPL: 16.9 (ref 7–25)
CALCIUM SPEC-SCNC: 9.5 MG/DL (ref 8.6–10.5)
CHLORIDE SERPL-SCNC: 107 MMOL/L (ref 98–107)
CHOLEST SERPL-MCNC: 120 MG/DL (ref 0–200)
CO2 SERPL-SCNC: 26.7 MMOL/L (ref 22–29)
CREAT SERPL-MCNC: 0.77 MG/DL (ref 0.57–1)
DEPRECATED RDW RBC AUTO: 41.9 FL (ref 37–54)
EGFRCR SERPLBLD CKD-EPI 2021: 81.1 ML/MIN/1.73
EOSINOPHIL # BLD AUTO: 0.19 10*3/MM3 (ref 0–0.4)
EOSINOPHIL NFR BLD AUTO: 5.6 % (ref 0.3–6.2)
ERYTHROCYTE [DISTWIDTH] IN BLOOD BY AUTOMATED COUNT: 12.6 % (ref 12.3–15.4)
GLOBULIN UR ELPH-MCNC: 2.6 GM/DL
GLUCOSE SERPL-MCNC: 88 MG/DL (ref 65–99)
HCT VFR BLD AUTO: 41.1 % (ref 34–46.6)
HDLC SERPL-MCNC: 71 MG/DL (ref 40–60)
HGB BLD-MCNC: 13.8 G/DL (ref 12–15.9)
IMM GRANULOCYTES # BLD AUTO: 0 10*3/MM3 (ref 0–0.05)
IMM GRANULOCYTES NFR BLD AUTO: 0 % (ref 0–0.5)
LDLC SERPL CALC-MCNC: 40 MG/DL (ref 0–100)
LDLC/HDLC SERPL: 0.6 {RATIO}
LYMPHOCYTES # BLD AUTO: 1.18 10*3/MM3 (ref 0.7–3.1)
LYMPHOCYTES NFR BLD AUTO: 34.7 % (ref 19.6–45.3)
MCH RBC QN AUTO: 31 PG (ref 26.6–33)
MCHC RBC AUTO-ENTMCNC: 33.6 G/DL (ref 31.5–35.7)
MCV RBC AUTO: 92.4 FL (ref 79–97)
MONOCYTES # BLD AUTO: 0.28 10*3/MM3 (ref 0.1–0.9)
MONOCYTES NFR BLD AUTO: 8.2 % (ref 5–12)
NEUTROPHILS NFR BLD AUTO: 1.71 10*3/MM3 (ref 1.7–7)
NEUTROPHILS NFR BLD AUTO: 50.3 % (ref 42.7–76)
NRBC BLD AUTO-RTO: 0 /100 WBC (ref 0–0.2)
PLATELET # BLD AUTO: 180 10*3/MM3 (ref 140–450)
PMV BLD AUTO: 10.1 FL (ref 6–12)
POTASSIUM SERPL-SCNC: 4.2 MMOL/L (ref 3.5–5.2)
PROT SERPL-MCNC: 6.8 G/DL (ref 6–8.5)
RBC # BLD AUTO: 4.45 10*6/MM3 (ref 3.77–5.28)
SODIUM SERPL-SCNC: 143 MMOL/L (ref 136–145)
TRIGL SERPL-MCNC: 31 MG/DL (ref 0–150)
TSH SERPL DL<=0.05 MIU/L-ACNC: 1.88 UIU/ML (ref 0.27–4.2)
VIT B12 BLD-MCNC: 991 PG/ML (ref 211–946)
VLDLC SERPL-MCNC: 9 MG/DL (ref 5–40)
WBC NRBC COR # BLD AUTO: 3.4 10*3/MM3 (ref 3.4–10.8)

## 2025-07-24 PROCEDURE — 36415 COLL VENOUS BLD VENIPUNCTURE: CPT | Performed by: STUDENT IN AN ORGANIZED HEALTH CARE EDUCATION/TRAINING PROGRAM

## 2025-07-24 PROCEDURE — 80053 COMPREHEN METABOLIC PANEL: CPT | Performed by: STUDENT IN AN ORGANIZED HEALTH CARE EDUCATION/TRAINING PROGRAM

## 2025-07-24 PROCEDURE — 82306 VITAMIN D 25 HYDROXY: CPT | Performed by: STUDENT IN AN ORGANIZED HEALTH CARE EDUCATION/TRAINING PROGRAM

## 2025-07-24 PROCEDURE — 80061 LIPID PANEL: CPT | Performed by: STUDENT IN AN ORGANIZED HEALTH CARE EDUCATION/TRAINING PROGRAM

## 2025-07-24 PROCEDURE — 82607 VITAMIN B-12: CPT | Performed by: STUDENT IN AN ORGANIZED HEALTH CARE EDUCATION/TRAINING PROGRAM

## 2025-07-24 PROCEDURE — 84443 ASSAY THYROID STIM HORMONE: CPT | Performed by: STUDENT IN AN ORGANIZED HEALTH CARE EDUCATION/TRAINING PROGRAM

## 2025-07-24 PROCEDURE — 99214 OFFICE O/P EST MOD 30 MIN: CPT | Performed by: STUDENT IN AN ORGANIZED HEALTH CARE EDUCATION/TRAINING PROGRAM

## 2025-07-24 PROCEDURE — 1126F AMNT PAIN NOTED NONE PRSNT: CPT | Performed by: STUDENT IN AN ORGANIZED HEALTH CARE EDUCATION/TRAINING PROGRAM

## 2025-07-24 PROCEDURE — 85025 COMPLETE CBC W/AUTO DIFF WBC: CPT | Performed by: STUDENT IN AN ORGANIZED HEALTH CARE EDUCATION/TRAINING PROGRAM

## 2025-07-24 NOTE — PROGRESS NOTES
Follow Up Office Visit      Date: 2025   Patient Name: Tatyana Willett  : 1950   MRN: [id number]     Chief Complaint:    Chief Complaint   Patient presents with    Hyperlipidemia       History of Present Illness: Tatyana Willett is a 74 y.o. female  with history of PFO, TIA, GERD, ANNETTA who is here today to follow up with the following problems.    Primary Care Follow-Up  Conditions present: other    Treatment compliance:  All of the time  Exercise:  Most days    History of Present Illness  The patient presents for follow-up.    She has been experiencing issues with her trigger finger, which she has been unable to straighten despite physical therapy. This condition led to the development of carpal tunnel syndrome. She received injections, and within a week, her finger straightened out. However, she still experiences numbness in her fingers. Regardless of this, she maintains good strength and does not have any difficulties with writing or other activities.    She expresses concern about her hip health. She is currently taking calcium and vitamin D supplements and engages in regular walking exercises. She also mentions that her mother had osteoporosis, which became quite severe over time, including spine fractures.    FAMILY HISTORY  Her mother had osteoporosis, which became rather severe and included spine fractures.       Vitamin D def  - 1000 IU daily    History of TIA  -crestor 20mg       Mammo Birads 1 3/2025    Dexa: osteopenia     Carpal Tunnel  Trigger finger  - I personally reviewed note by Dr. Mindy Cervantes of Hand surgery dated 25. Had b/l steroid injections at that time.     Subjective      Review of Systems:   Review of Systems    I have reviewed the patients family history, social history, past medical history, past surgical history and have updated it as appropriate.     Medications:     Current Outpatient Medications:     acetaminophen (Tylenol) 325 MG tablet, Take 2 tablets by  mouth Every 6 (Six) Hours As Needed for Moderate Pain., Disp: 100 tablet, Rfl: 1    aspirin 81 MG chewable tablet, Chew 1 tablet Daily., Disp: 30 tablet, Rfl: 0    Calcium Carbonate-Vitamin D (CALTRATE 600+D PO), Take 1 tablet by mouth Daily., Disp: , Rfl:     Glucosamine-Chondroit-Vit C-Mn (GLUCOSAMINE 1500 COMPLEX PO), Take 1 tablet by mouth daily., Disp: , Rfl:     loratadine (CLARITIN) 10 MG tablet, Take 1 tablet by mouth Daily As Needed., Disp: , Rfl:     montelukast (SINGULAIR) 10 MG tablet, TAKE 1 TABLET BY MOUTH DAILY AS NEEDED, Disp: 90 tablet, Rfl: 1    Multiple Vitamins-Minerals (WOMENS ONE DAILY) tablet, Take 1 tablet by mouth Daily., Disp: , Rfl:     rosuvastatin (CRESTOR) 20 MG tablet, Take 1 tablet by mouth Daily., Disp: 90 tablet, Rfl: 3    Scopolamine 1 MG/3DAYS patch, Place 1 patch on the skin as directed by provider Every 72 (Seventy-Two) Hours. Apply at least 4 hours prior to event, do not use for more than 6 consecutive days., Disp: 4 each, Rfl: 0    vitamin C (ASCORBIC ACID) 500 MG tablet, Take 1 tablet by mouth Daily., Disp: , Rfl:     albuterol sulfate  (90 Base) MCG/ACT inhaler, Inhale 2 puffs Every 4 (Four) Hours As Needed for Wheezing or Shortness of Air. (Patient not taking: Reported on 7/24/2025), Disp: 18 g, Rfl: 1    fluticasone (FLONASE) 50 MCG/ACT nasal spray, Administer 1 spray into the nostril(s) as directed by provider Every Morning., Disp: , Rfl:     Vitamin D, Cholecalciferol, 25 MCG (1000 UT) capsule, Take 1 capsule by mouth Daily. (Patient not taking: Reported on 7/24/2025), Disp: , Rfl:     Allergies:   Allergies   Allergen Reactions    Shellfish Allergy Hives     oysters    Poison Ivy Extract [Poison Ivy Extract] Rash       Objective     Physical Exam: Please see above  Vital Signs:   Vitals:    07/24/25 0833   BP: 112/64   BP Location: Left arm   Patient Position: Sitting   Cuff Size: Adult   Pulse: 84   Temp: 97.3 °F (36.3 °C)   Weight: 62.3 kg (137 lb 6.4 oz)    PainSc: 0-No pain     Body mass index is 23.23 kg/m².  BMI is within normal parameters. No other follow-up for BMI required.       Physical Exam  Vitals reviewed.   Constitutional:       General: She is not in acute distress.     Appearance: Normal appearance. She is normal weight. She is not ill-appearing or toxic-appearing.   HENT:      Head: Normocephalic and atraumatic.      Right Ear: External ear normal.      Left Ear: External ear normal.      Nose: Nose normal.      Mouth/Throat:      Mouth: Mucous membranes are moist.   Eyes:      General: No scleral icterus.     Extraocular Movements: Extraocular movements intact.   Cardiovascular:      Rate and Rhythm: Normal rate and regular rhythm.      Pulses: Normal pulses.      Heart sounds: Normal heart sounds.   Pulmonary:      Effort: Pulmonary effort is normal.      Breath sounds: Normal breath sounds.   Abdominal:      General: Abdomen is flat. There is no distension.   Musculoskeletal:         General: No swelling or tenderness. Normal range of motion.      Cervical back: Normal range of motion. No rigidity.   Skin:     General: Skin is warm and dry.      Capillary Refill: Capillary refill takes less than 2 seconds.   Neurological:      General: No focal deficit present.      Mental Status: She is alert and oriented to person, place, and time.   Psychiatric:         Mood and Affect: Mood normal.         Behavior: Behavior normal.         Judgment: Judgment normal.       Physical Exam        Procedures    Results:   Labs:   Hemoglobin A1C   Date Value Ref Range Status   01/16/2023 4.90 4.80 - 5.60 % Final     TSH   Date Value Ref Range Status   07/15/2024 2.200 0.270 - 4.200 uIU/mL Final      Results  Imaging   - Bone density test: Mild osteopenia with a T-score of -1.7 and 1.4 in the femoral neck      Imaging:   No valid procedures specified.     Assessment / Plan      Assessment/Plan:   Problem List Items Addressed This Visit       History of transient  ischemic attack (TIA)    Overview   Possible TIA s/p tPA MRI brain 1/16/2023  Echo (1/16/2023): Possible positive bubble study.  Normal LVEF.  Calcification aortic valve         Current Assessment & Plan   Chronic, stable. Continue crestor for prevention. Repeat lipid panel.          Relevant Orders    Lipid panel    Vitamin D deficiency - Primary    Current Assessment & Plan   Chronic, stable. Continue daily vit D supplement. Repeat level.          Relevant Orders    Vitamin D 25 hydroxy    Osteopenia of necks of both femurs    Current Assessment & Plan   Newly identified. Increased frax risk. Discuss Vit D and Ca supplementation, resistance training. Refer to rheumatology for consideration of evenity or antiresorptive therapy         Relevant Orders    TSH Rfx On Abnormal To Free T4    Ambulatory Referral to Rheumatology     Other Visit Diagnoses         B12 deficiency        Relevant Orders    Vitamin B12      Abnormal thyroid blood test        Relevant Orders    TSH Rfx On Abnormal To Free T4      Routine health maintenance        Relevant Orders    CBC w AUTO Differential    Comprehensive metabolic panel      Fracture Risk Assessment Score (FRAX) indicating greater than 20% risk for major osteoporosis-related fracture        Relevant Orders    Ambulatory Referral to Rheumatology      Screening for ischemic heart disease        Relevant Orders    Lipid panel      Abnormal findings on diagnostic imaging of other specified body structures        Relevant Orders    TSH Rfx On Abnormal To Free T4            Assessment & Plan      Follow-up: The patient will follow up in 6 months for a routine annual wellness visit.       Follow Up:   Return in about 6 months (around 1/24/2026) for Medicare Wellness.      Lit Gallagher MD   [facility] Petar Ingram    Patient or patient representative verbalized consent for the use of Ambient Listening during the visit with  Lit Gallagher MD for chart documentation. 7/24/2025   09:02 EDT

## 2025-07-29 DIAGNOSIS — T75.3XXA MOTION SICKNESS, INITIAL ENCOUNTER: ICD-10-CM

## 2025-07-29 RX ORDER — SCOPOLAMINE 1 MG/3D
1 PATCH, EXTENDED RELEASE TRANSDERMAL
Qty: 4 EACH | Refills: 0 | Status: SHIPPED | OUTPATIENT
Start: 2025-07-29

## 2025-07-29 NOTE — TELEPHONE ENCOUNTER
Caller: Tatyana Willett    Relationship: Self    Best call back number: [phone]     Requested Prescriptions:   Requested Prescriptions     Pending Prescriptions Disp Refills    Scopolamine 1 MG/3DAYS patch 4 each 0     Sig: Place 1 patch on the skin as directed by provider Every 72 (Seventy-Two) Hours. Apply at least 4 hours prior to event, do not use for more than 6 consecutive days.        Pharmacy where request should be sent: [facility] [id number] [address] [facility] [phone] [facility] [phone] FX     Last office visit with prescribing clinician: 7/24/2025   Last telemedicine visit with prescribing clinician: Visit date not found   Next office visit with prescribing clinician: 1/27/2026     Additional details provided by patient:     Does the patient have less than a 3 day supply:  [] Yes  [x] No    Would you like a call back once the refill request has been completed: [] Yes [x] No    If the office needs to give you a call back, can they leave a voicemail: [] Yes [x] No    Yuni Mcfadden   07/29/25 16:07 EDT

## 2025-08-05 ENCOUNTER — PRIOR AUTHORIZATION (OUTPATIENT)
Dept: INTERNAL MEDICINE | Facility: CLINIC | Age: 75
End: 2025-08-05
Payer: MEDICARE

## 2025-08-07 ENCOUNTER — TELEPHONE (OUTPATIENT)
Dept: INTERNAL MEDICINE | Facility: CLINIC | Age: 75
End: 2025-08-07
Payer: MEDICARE